# Patient Record
Sex: FEMALE | Race: BLACK OR AFRICAN AMERICAN | Employment: UNEMPLOYED | URBAN - METROPOLITAN AREA
[De-identification: names, ages, dates, MRNs, and addresses within clinical notes are randomized per-mention and may not be internally consistent; named-entity substitution may affect disease eponyms.]

---

## 2020-07-25 PROBLEM — N17.9 AKI (ACUTE KIDNEY INJURY) (HCC): Status: ACTIVE | Noted: 2020-07-25

## 2020-09-01 DIAGNOSIS — N18.4 ANEMIA IN STAGE 4 CHRONIC KIDNEY DISEASE (HCC): ICD-10-CM

## 2020-09-01 DIAGNOSIS — N17.9 AKI (ACUTE KIDNEY INJURY) (HCC): ICD-10-CM

## 2020-09-01 DIAGNOSIS — D63.1 ANEMIA IN STAGE 4 CHRONIC KIDNEY DISEASE (HCC): ICD-10-CM

## 2020-09-01 LAB
ALBUMIN SERPL-MCNC: 3.7 G/DL (ref 3.4–5)
ANION GAP SERPL CALCULATED.3IONS-SCNC: 13 MMOL/L (ref 3–16)
BASOPHILS ABSOLUTE: 0.1 K/UL (ref 0–0.2)
BASOPHILS RELATIVE PERCENT: 0.8 %
BUN BLDV-MCNC: 66 MG/DL (ref 7–20)
CALCIUM SERPL-MCNC: 9 MG/DL (ref 8.3–10.6)
CHLORIDE BLD-SCNC: 100 MMOL/L (ref 99–110)
CO2: 23 MMOL/L (ref 21–32)
CREAT SERPL-MCNC: 5.8 MG/DL (ref 0.6–1.2)
CREATININE URINE: 74.8 MG/DL (ref 28–259)
EOSINOPHILS ABSOLUTE: 0.5 K/UL (ref 0–0.6)
EOSINOPHILS RELATIVE PERCENT: 3.8 %
GFR AFRICAN AMERICAN: 9
GFR NON-AFRICAN AMERICAN: 7
GLUCOSE BLD-MCNC: 119 MG/DL (ref 70–99)
HCT VFR BLD CALC: 24.9 % (ref 36–48)
HEMOGLOBIN: 8.1 G/DL (ref 12–16)
IRON SATURATION: 23 % (ref 15–50)
IRON: 47 UG/DL (ref 37–145)
LYMPHOCYTES ABSOLUTE: 2.5 K/UL (ref 1–5.1)
LYMPHOCYTES RELATIVE PERCENT: 21.1 %
MAGNESIUM: 2.2 MG/DL (ref 1.8–2.4)
MCH RBC QN AUTO: 29.1 PG (ref 26–34)
MCHC RBC AUTO-ENTMCNC: 32.7 G/DL (ref 31–36)
MCV RBC AUTO: 89.1 FL (ref 80–100)
MICROALBUMIN UR-MCNC: 21.3 MG/DL
MICROALBUMIN/CREAT UR-RTO: 284.8 MG/G (ref 0–30)
MONOCYTES ABSOLUTE: 0.7 K/UL (ref 0–1.3)
MONOCYTES RELATIVE PERCENT: 6.2 %
NEUTROPHILS ABSOLUTE: 8.1 K/UL (ref 1.7–7.7)
NEUTROPHILS RELATIVE PERCENT: 68.1 %
PDW BLD-RTO: 16.4 % (ref 12.4–15.4)
PHOSPHORUS: 4.5 MG/DL (ref 2.5–4.9)
PLATELET # BLD: 264 K/UL (ref 135–450)
PMV BLD AUTO: 7.2 FL (ref 5–10.5)
POTASSIUM SERPL-SCNC: 5.2 MMOL/L (ref 3.5–5.1)
RBC # BLD: 2.8 M/UL (ref 4–5.2)
SODIUM BLD-SCNC: 136 MMOL/L (ref 136–145)
TOTAL IRON BINDING CAPACITY: 203 UG/DL (ref 260–445)
WBC # BLD: 11.9 K/UL (ref 4–11)

## 2021-11-15 ENCOUNTER — CLINICAL DOCUMENTATION (OUTPATIENT)
Dept: OTHER | Age: 66
End: 2021-11-15

## 2022-03-14 PROBLEM — N17.9 ACUTE-ON-CHRONIC KIDNEY INJURY (HCC): Status: ACTIVE | Noted: 2018-12-03

## 2022-03-17 PROBLEM — I71.40 AAA (ABDOMINAL AORTIC ANEURYSM) WITHOUT RUPTURE (HCC): Status: ACTIVE | Noted: 2022-03-17

## 2023-09-08 PROBLEM — E78.5 DYSLIPIDEMIA: Status: ACTIVE | Noted: 2023-09-08

## 2023-11-01 ENCOUNTER — TELEPHONE (OUTPATIENT)
Dept: VASCULAR SURGERY | Age: 68
End: 2023-11-01

## 2023-11-01 DIAGNOSIS — I71.40 ABDOMINAL AORTIC ANEURYSM (AAA) WITHOUT RUPTURE, UNSPECIFIED PART (HCC): Primary | ICD-10-CM

## 2023-11-01 NOTE — TELEPHONE ENCOUNTER
Spoke with Phoebe Ya RN at [facility] about follow up appt and CT. I was unable to get Ms. Saleem scheduled for CT prior to appt on 11/7. I was able to schedule CT for 11/14 at 9:30a with arrival time of 9am and then a follow up appt with Dr. Tommy Jack in office at [phone]. This time and dates were repeated and verified with Juana SCHNEIDER and she was informing facility transport of changes.

## 2023-12-09 ENCOUNTER — HOSPITAL ENCOUNTER (EMERGENCY)
Age: 68
Discharge: HOME | End: 2023-12-09
Attending: STUDENT IN AN ORGANIZED HEALTH CARE EDUCATION/TRAINING PROGRAM
Payer: MEDICAID

## 2023-12-09 VITALS
RESPIRATION RATE: 18 BRPM | WEIGHT: 265 LBS | TEMPERATURE: 98.8 F | SYSTOLIC BLOOD PRESSURE: 137 MMHG | BODY MASS INDEX: 41.5 KG/M2 | HEART RATE: 74 BPM | OXYGEN SATURATION: 98 % | DIASTOLIC BLOOD PRESSURE: 71 MMHG

## 2023-12-09 DIAGNOSIS — Z78.9 PROBLEM WITH VASCULAR ACCESS: Primary | ICD-10-CM

## 2023-12-09 PROCEDURE — 99283 EMERGENCY DEPT VISIT LOW MDM: CPT

## 2023-12-09 NOTE — ED NOTES
Report called to Hollie Anderson at [facility].      Orlin Neff RN  12/09/23 8801

## 2023-12-09 NOTE — ED TRIAGE NOTES
Pt to ed from dialysis. Pt needs to have white clip replaced on arterial dialysis replaced. Pt lives [facility].

## 2023-12-09 NOTE — ED NOTES
Patient prepared for and ready to be discharged. Patient discharged at this time in no acute distress after verbalizing understanding of discharge instructions. Patient left after receiving After Visit Summary instructions.        Cathi Mendoza RN  12/09/23 6968

## 2024-02-13 ENCOUNTER — HOSPITAL ENCOUNTER (OUTPATIENT)
Dept: CT IMAGING | Age: 69
Discharge: HOME | End: 2024-02-13
Attending: SURGERY

## 2024-10-12 RX ORDER — TORSEMIDE 20 MG/1
40 TABLET ORAL DAILY
COMMUNITY

## 2024-10-21 ENCOUNTER — OFFICE VISIT (OUTPATIENT)
Dept: ENT CLINIC | Age: 69
End: 2024-10-21
Payer: MEDICAID

## 2024-10-21 VITALS
DIASTOLIC BLOOD PRESSURE: 66 MMHG | SYSTOLIC BLOOD PRESSURE: 134 MMHG | HEIGHT: 67 IN | HEART RATE: 55 BPM | TEMPERATURE: 97.3 F | BODY MASS INDEX: 41.5 KG/M2

## 2024-10-21 DIAGNOSIS — J34.89 NASAL VESTIBULITIS: ICD-10-CM

## 2024-10-21 DIAGNOSIS — H92.01 REFERRED OTALGIA OF RIGHT EAR: Primary | ICD-10-CM

## 2024-10-21 DIAGNOSIS — M26.641 ARTHRITIS OF RIGHT TEMPOROMANDIBULAR JOINT: ICD-10-CM

## 2024-10-21 PROCEDURE — 3075F SYST BP GE 130 - 139MM HG: CPT | Performed by: OTOLARYNGOLOGY

## 2024-10-21 PROCEDURE — 99203 OFFICE O/P NEW LOW 30 MIN: CPT | Performed by: OTOLARYNGOLOGY

## 2024-10-21 PROCEDURE — 3078F DIAST BP <80 MM HG: CPT | Performed by: OTOLARYNGOLOGY

## 2024-10-21 PROCEDURE — 1123F ACP DISCUSS/DSCN MKR DOCD: CPT | Performed by: OTOLARYNGOLOGY

## 2024-10-21 RX ORDER — METHYLPREDNISOLONE 4 MG/1
TABLET ORAL
Qty: 1 KIT | Refills: 0 | Status: SHIPPED | OUTPATIENT
Start: 2024-10-21

## 2024-10-21 RX ORDER — METHOCARBAMOL 500 MG/1
500 TABLET, FILM COATED ORAL 4 TIMES DAILY
COMMUNITY

## 2024-10-21 RX ORDER — CALCITRIOL 0.5 UG/1
0.5 CAPSULE, LIQUID FILLED ORAL DAILY
COMMUNITY

## 2024-10-21 RX ORDER — M-VIT,TX,IRON,MINS/CALC/FOLIC 27MG-0.4MG
1 TABLET ORAL DAILY
COMMUNITY

## 2024-10-21 RX ORDER — MUPIROCIN 20 MG/G
OINTMENT TOPICAL
Qty: 15 G | Refills: 1 | Status: SHIPPED | OUTPATIENT
Start: 2024-10-21

## 2024-10-21 RX ORDER — NORTRIPTYLINE HYDROCHLORIDE 25 MG/1
25 CAPSULE ORAL NIGHTLY
COMMUNITY

## 2024-10-21 NOTE — PROGRESS NOTES
vestibulitis  PLAN: I prescribed a methylprednisolone Dosepak for the patient for her myofascial pain.  I have prescribed mupirocin ointment for her nasal vestibulitis.  In addition she has some pruritus where she had thyroid surgery done at the [facility] about 2 months ago.  I would recommend use of over-the-counter hydrocortisone cream for that.  FOLLOW-UP: As needed.

## 2024-11-03 ENCOUNTER — HOSPITAL ENCOUNTER (INPATIENT)
Age: 69
LOS: 8 days | Discharge: HOME | DRG: 246 | End: 2024-11-11
Admitting: INTERNAL MEDICINE
Payer: MEDICAID

## 2024-11-03 ENCOUNTER — APPOINTMENT (OUTPATIENT)
Dept: CT IMAGING | Age: 69
DRG: 246 | End: 2024-11-03
Payer: MEDICAID

## 2024-11-03 DIAGNOSIS — K62.5 RECTAL BLEEDING: Primary | ICD-10-CM

## 2024-11-03 DIAGNOSIS — I50.32 CHRONIC DIASTOLIC CONGESTIVE HEART FAILURE (HCC): ICD-10-CM

## 2024-11-03 DIAGNOSIS — K55.9 ISCHEMIC COLITIS (HCC): ICD-10-CM

## 2024-11-03 DIAGNOSIS — K62.5 RECTAL BLEED: ICD-10-CM

## 2024-11-03 PROBLEM — K52.9 ACUTE COLITIS: Status: ACTIVE | Noted: 2024-11-03

## 2024-11-03 LAB
ABO + RH BLD: NORMAL
ALBUMIN SERPL-MCNC: 3.8 G/DL (ref 3.4–5)
ALP SERPL-CCNC: 102 U/L (ref 40–129)
ALT SERPL-CCNC: 17 U/L (ref 10–40)
ANION GAP SERPL CALCULATED.3IONS-SCNC: 11 MMOL/L (ref 3–16)
AST SERPL-CCNC: 67 U/L (ref 15–37)
BASOPHILS # BLD: 0 K/UL (ref 0–0.2)
BASOPHILS NFR BLD: 0.3 %
BILIRUB DIRECT SERPL-MCNC: 0.2 MG/DL (ref 0–0.3)
BILIRUB INDIRECT SERPL-MCNC: 0.2 MG/DL (ref 0–1)
BILIRUB SERPL-MCNC: 0.4 MG/DL (ref 0–1)
BLD GP AB SCN SERPL QL: NORMAL
BUN SERPL-MCNC: 24 MG/DL (ref 7–20)
CALCIUM SERPL-MCNC: 8.4 MG/DL (ref 8.3–10.6)
CHLORIDE SERPL-SCNC: 94 MMOL/L (ref 99–110)
CO2 SERPL-SCNC: 23 MMOL/L (ref 21–32)
CREAT SERPL-MCNC: 7.5 MG/DL (ref 0.6–1.2)
DEPRECATED RDW RBC AUTO: 23.6 % (ref 12.4–15.4)
EOSINOPHIL # BLD: 0.9 K/UL (ref 0–0.6)
EOSINOPHIL NFR BLD: 6.3 %
GFR SERPLBLD CREATININE-BSD FMLA CKD-EPI: 5 ML/MIN/{1.73_M2}
GLUCOSE BLD-MCNC: 80 MG/DL (ref 70–99)
GLUCOSE SERPL-MCNC: 95 MG/DL (ref 70–99)
HCT VFR BLD AUTO: 34.6 % (ref 36–48)
HGB BLD-MCNC: 11.4 G/DL (ref 12–16)
LACTATE BLDV-SCNC: 0.6 MMOL/L (ref 0.4–2)
LIPASE SERPL-CCNC: 26 U/L (ref 13–60)
LYMPHOCYTES # BLD: 1.9 K/UL (ref 1–5.1)
LYMPHOCYTES NFR BLD: 12.7 %
MCH RBC QN AUTO: 30.1 PG (ref 26–34)
MCHC RBC AUTO-ENTMCNC: 33 G/DL (ref 31–36)
MCV RBC AUTO: 91.4 FL (ref 80–100)
MONOCYTES # BLD: 1 K/UL (ref 0–1.3)
MONOCYTES NFR BLD: 7.1 %
NEUTROPHILS # BLD: 10.8 K/UL (ref 1.7–7.7)
NEUTROPHILS NFR BLD: 73.6 %
PERFORMED ON: NORMAL
PLATELET # BLD AUTO: 188 K/UL (ref 135–450)
PMV BLD AUTO: 7.2 FL (ref 5–10.5)
POTASSIUM SERPL-SCNC: 4.2 MMOL/L (ref 3.5–5.1)
POTASSIUM SERPL-SCNC: 6 MMOL/L (ref 3.5–5.1)
PROT SERPL-MCNC: 8.1 G/DL (ref 6.4–8.2)
RBC # BLD AUTO: 3.78 M/UL (ref 4–5.2)
SODIUM SERPL-SCNC: 128 MMOL/L (ref 136–145)
WBC # BLD AUTO: 14.7 K/UL (ref 4–11)

## 2024-11-03 PROCEDURE — 99223 1ST HOSP IP/OBS HIGH 75: CPT | Performed by: SURGERY

## 2024-11-03 PROCEDURE — 83605 ASSAY OF LACTIC ACID: CPT

## 2024-11-03 PROCEDURE — 96374 THER/PROPH/DIAG INJ IV PUSH: CPT

## 2024-11-03 PROCEDURE — 6360000004 HC RX CONTRAST MEDICATION

## 2024-11-03 PROCEDURE — 6360000002 HC RX W HCPCS: Performed by: INTERNAL MEDICINE

## 2024-11-03 PROCEDURE — 71275 CT ANGIOGRAPHY CHEST: CPT

## 2024-11-03 PROCEDURE — 6370000000 HC RX 637 (ALT 250 FOR IP): Performed by: INTERNAL MEDICINE

## 2024-11-03 PROCEDURE — 36415 COLL VENOUS BLD VENIPUNCTURE: CPT

## 2024-11-03 PROCEDURE — 83690 ASSAY OF LIPASE: CPT

## 2024-11-03 PROCEDURE — 99254 IP/OBS CNSLTJ NEW/EST MOD 60: CPT | Performed by: SURGERY

## 2024-11-03 PROCEDURE — 86850 RBC ANTIBODY SCREEN: CPT

## 2024-11-03 PROCEDURE — 2060000000 HC ICU INTERMEDIATE R&B

## 2024-11-03 PROCEDURE — 2580000003 HC RX 258: Performed by: PHYSICIAN ASSISTANT

## 2024-11-03 PROCEDURE — 85025 COMPLETE CBC W/AUTO DIFF WBC: CPT

## 2024-11-03 PROCEDURE — 86901 BLOOD TYPING SEROLOGIC RH(D): CPT

## 2024-11-03 PROCEDURE — 86900 BLOOD TYPING SEROLOGIC ABO: CPT

## 2024-11-03 PROCEDURE — 93005 ELECTROCARDIOGRAM TRACING: CPT

## 2024-11-03 PROCEDURE — 80048 BASIC METABOLIC PNL TOTAL CA: CPT

## 2024-11-03 PROCEDURE — 2580000003 HC RX 258: Performed by: INTERNAL MEDICINE

## 2024-11-03 PROCEDURE — 84132 ASSAY OF SERUM POTASSIUM: CPT

## 2024-11-03 PROCEDURE — 80076 HEPATIC FUNCTION PANEL: CPT

## 2024-11-03 PROCEDURE — 6360000002 HC RX W HCPCS: Performed by: PHYSICIAN ASSISTANT

## 2024-11-03 PROCEDURE — 99285 EMERGENCY DEPT VISIT HI MDM: CPT

## 2024-11-03 RX ORDER — MUPIROCIN CALCIUM 20 MG/G
CREAM TOPICAL
COMMUNITY
Start: 2024-10-21

## 2024-11-03 RX ORDER — ACETAMINOPHEN 650 MG/1
650 SUPPOSITORY RECTAL EVERY 6 HOURS PRN
Status: DISCONTINUED | OUTPATIENT
Start: 2024-11-03 | End: 2024-11-11 | Stop reason: HOSPADM

## 2024-11-03 RX ORDER — SODIUM CHLORIDE 9 MG/ML
INJECTION, SOLUTION INTRAVENOUS PRN
Status: DISCONTINUED | OUTPATIENT
Start: 2024-11-03 | End: 2024-11-11 | Stop reason: HOSPADM

## 2024-11-03 RX ORDER — SEVELAMER CARBONATE 800 MG/1
800 TABLET, FILM COATED ORAL
Status: DISCONTINUED | OUTPATIENT
Start: 2024-11-04 | End: 2024-11-11 | Stop reason: HOSPADM

## 2024-11-03 RX ORDER — METRONIDAZOLE 500 MG/100ML
500 INJECTION, SOLUTION INTRAVENOUS EVERY 8 HOURS
Status: DISCONTINUED | OUTPATIENT
Start: 2024-11-04 | End: 2024-11-05

## 2024-11-03 RX ORDER — DIVALPROEX SODIUM 125 MG/1
125 TABLET, DELAYED RELEASE ORAL NIGHTLY
Status: DISCONTINUED | OUTPATIENT
Start: 2024-11-03 | End: 2024-11-11 | Stop reason: HOSPADM

## 2024-11-03 RX ORDER — DIPHENHYDRAMINE HCL 25 MG
25 CAPSULE ORAL EVERY 4 HOURS PRN
COMMUNITY

## 2024-11-03 RX ORDER — SODIUM CHLORIDE 0.9 % (FLUSH) 0.9 %
5-40 SYRINGE (ML) INJECTION PRN
Status: DISCONTINUED | OUTPATIENT
Start: 2024-11-03 | End: 2024-11-11 | Stop reason: HOSPADM

## 2024-11-03 RX ORDER — NORTRIPTYLINE HYDROCHLORIDE 25 MG/1
25 CAPSULE ORAL NIGHTLY
Status: DISCONTINUED | OUTPATIENT
Start: 2024-11-03 | End: 2024-11-11 | Stop reason: HOSPADM

## 2024-11-03 RX ORDER — CALCITRIOL 0.25 UG/1
2 CAPSULE, LIQUID FILLED ORAL 2 TIMES DAILY
Status: DISCONTINUED | OUTPATIENT
Start: 2024-11-03 | End: 2024-11-11 | Stop reason: HOSPADM

## 2024-11-03 RX ORDER — DILTIAZEM HYDROCHLORIDE 300 MG/1
300 CAPSULE, COATED, EXTENDED RELEASE ORAL NIGHTLY
COMMUNITY

## 2024-11-03 RX ORDER — DEXTROSE MONOHYDRATE 100 MG/ML
INJECTION, SOLUTION INTRAVENOUS CONTINUOUS PRN
Status: DISCONTINUED | OUTPATIENT
Start: 2024-11-03 | End: 2024-11-11 | Stop reason: HOSPADM

## 2024-11-03 RX ORDER — INSULIN LISPRO 100 [IU]/ML
0-4 INJECTION, SOLUTION INTRAVENOUS; SUBCUTANEOUS
Status: DISCONTINUED | OUTPATIENT
Start: 2024-11-03 | End: 2024-11-11 | Stop reason: HOSPADM

## 2024-11-03 RX ORDER — ONDANSETRON 4 MG/1
4 TABLET, ORALLY DISINTEGRATING ORAL EVERY 8 HOURS PRN
Status: DISCONTINUED | OUTPATIENT
Start: 2024-11-03 | End: 2024-11-11 | Stop reason: HOSPADM

## 2024-11-03 RX ORDER — METOPROLOL SUCCINATE 50 MG/1
50 TABLET, EXTENDED RELEASE ORAL NIGHTLY
Status: DISCONTINUED | OUTPATIENT
Start: 2024-11-03 | End: 2024-11-11 | Stop reason: HOSPADM

## 2024-11-03 RX ORDER — SEVELAMER CARBONATE 800 MG/1
TABLET, FILM COATED ORAL
COMMUNITY

## 2024-11-03 RX ORDER — SENNOSIDES 8.6 MG
1 CAPSULE ORAL NIGHTLY
COMMUNITY

## 2024-11-03 RX ORDER — AMIODARONE HYDROCHLORIDE 200 MG/1
200 TABLET ORAL 2 TIMES DAILY
Status: DISCONTINUED | OUTPATIENT
Start: 2024-11-03 | End: 2024-11-11 | Stop reason: HOSPADM

## 2024-11-03 RX ORDER — ATORVASTATIN CALCIUM 40 MG/1
40 TABLET, FILM COATED ORAL NIGHTLY
Status: DISCONTINUED | OUTPATIENT
Start: 2024-11-03 | End: 2024-11-11 | Stop reason: HOSPADM

## 2024-11-03 RX ORDER — SODIUM CHLORIDE 0.9 % (FLUSH) 0.9 %
5-40 SYRINGE (ML) INJECTION EVERY 12 HOURS SCHEDULED
Status: DISCONTINUED | OUTPATIENT
Start: 2024-11-03 | End: 2024-11-11 | Stop reason: HOSPADM

## 2024-11-03 RX ORDER — CETIRIZINE HYDROCHLORIDE 10 MG/1
10 TABLET ORAL DAILY
Status: DISCONTINUED | OUTPATIENT
Start: 2024-11-04 | End: 2024-11-11 | Stop reason: HOSPADM

## 2024-11-03 RX ORDER — MORPHINE SULFATE 2 MG/ML
2 INJECTION, SOLUTION INTRAMUSCULAR; INTRAVENOUS EVERY 4 HOURS PRN
Status: DISCONTINUED | OUTPATIENT
Start: 2024-11-03 | End: 2024-11-03

## 2024-11-03 RX ORDER — CALCIUM CARBONATE 500 MG/1
2 TABLET, CHEWABLE ORAL 3 TIMES DAILY
Status: DISCONTINUED | OUTPATIENT
Start: 2024-11-03 | End: 2024-11-11 | Stop reason: HOSPADM

## 2024-11-03 RX ORDER — DIVALPROEX SODIUM 125 MG/1
125 TABLET, DELAYED RELEASE ORAL NIGHTLY
COMMUNITY

## 2024-11-03 RX ORDER — SENNOSIDES A AND B 8.6 MG/1
1 TABLET, FILM COATED ORAL NIGHTLY
Status: DISCONTINUED | OUTPATIENT
Start: 2024-11-03 | End: 2024-11-11 | Stop reason: HOSPADM

## 2024-11-03 RX ORDER — OXYCODONE HYDROCHLORIDE 5 MG/1
5 TABLET ORAL 2 TIMES DAILY
Status: ON HOLD | COMMUNITY
End: 2024-11-11 | Stop reason: HOSPADM

## 2024-11-03 RX ORDER — TORSEMIDE 20 MG/1
40 TABLET ORAL EVERY MORNING
Status: DISCONTINUED | OUTPATIENT
Start: 2024-11-04 | End: 2024-11-11 | Stop reason: HOSPADM

## 2024-11-03 RX ORDER — ACETAMINOPHEN 325 MG/1
650 TABLET ORAL EVERY 6 HOURS PRN
Status: DISCONTINUED | OUTPATIENT
Start: 2024-11-03 | End: 2024-11-11 | Stop reason: HOSPADM

## 2024-11-03 RX ORDER — METRONIDAZOLE 500 MG/100ML
500 INJECTION, SOLUTION INTRAVENOUS EVERY 8 HOURS
Status: DISCONTINUED | OUTPATIENT
Start: 2024-11-03 | End: 2024-11-03 | Stop reason: SDUPTHER

## 2024-11-03 RX ORDER — DIPHENHYDRAMINE HCL 25 MG
25 TABLET ORAL EVERY 4 HOURS PRN
Status: DISCONTINUED | OUTPATIENT
Start: 2024-11-03 | End: 2024-11-11 | Stop reason: HOSPADM

## 2024-11-03 RX ORDER — DILTIAZEM HYDROCHLORIDE 300 MG/1
300 CAPSULE, COATED, EXTENDED RELEASE ORAL NIGHTLY
Status: DISCONTINUED | OUTPATIENT
Start: 2024-11-03 | End: 2024-11-11 | Stop reason: HOSPADM

## 2024-11-03 RX ORDER — POLYETHYLENE GLYCOL 3350 17 G/17G
17 POWDER, FOR SOLUTION ORAL EVERY MORNING
Status: DISCONTINUED | OUTPATIENT
Start: 2024-11-04 | End: 2024-11-11 | Stop reason: HOSPADM

## 2024-11-03 RX ORDER — GLUCAGON 1 MG/ML
1 KIT INJECTION PRN
Status: DISCONTINUED | OUTPATIENT
Start: 2024-11-03 | End: 2024-11-11 | Stop reason: HOSPADM

## 2024-11-03 RX ORDER — INSULIN GLARGINE 100 [IU]/ML
6 INJECTION, SOLUTION SUBCUTANEOUS NIGHTLY
Status: DISCONTINUED | OUTPATIENT
Start: 2024-11-04 | End: 2024-11-11 | Stop reason: HOSPADM

## 2024-11-03 RX ORDER — IOPAMIDOL 755 MG/ML
75 INJECTION, SOLUTION INTRAVASCULAR
Status: COMPLETED | OUTPATIENT
Start: 2024-11-03 | End: 2024-11-03

## 2024-11-03 RX ORDER — SODIUM CHLORIDE 9 MG/ML
INJECTION, SOLUTION INTRAVENOUS CONTINUOUS
Status: DISCONTINUED | OUTPATIENT
Start: 2024-11-03 | End: 2024-11-04

## 2024-11-03 RX ORDER — LANOLIN ALCOHOL/MO/W.PET/CERES
6 CREAM (GRAM) TOPICAL NIGHTLY PRN
Status: DISCONTINUED | OUTPATIENT
Start: 2024-11-03 | End: 2024-11-11 | Stop reason: HOSPADM

## 2024-11-03 RX ORDER — METHOCARBAMOL 500 MG/1
500 TABLET, FILM COATED ORAL EVERY 6 HOURS PRN
Status: DISCONTINUED | OUTPATIENT
Start: 2024-11-03 | End: 2024-11-11 | Stop reason: HOSPADM

## 2024-11-03 RX ORDER — ALBUTEROL SULFATE 0.83 MG/ML
2.5 SOLUTION RESPIRATORY (INHALATION) EVERY 4 HOURS PRN
Status: DISCONTINUED | OUTPATIENT
Start: 2024-11-03 | End: 2024-11-11 | Stop reason: HOSPADM

## 2024-11-03 RX ORDER — ONDANSETRON 2 MG/ML
4 INJECTION INTRAMUSCULAR; INTRAVENOUS EVERY 6 HOURS PRN
Status: DISCONTINUED | OUTPATIENT
Start: 2024-11-03 | End: 2024-11-11 | Stop reason: HOSPADM

## 2024-11-03 RX ORDER — AMIODARONE HYDROCHLORIDE 200 MG/1
200 TABLET ORAL 2 TIMES DAILY
COMMUNITY

## 2024-11-03 RX ADMIN — DILTIAZEM HYDROCHLORIDE 300 MG: 300 CAPSULE, COATED, EXTENDED RELEASE ORAL at 22:09

## 2024-11-03 RX ADMIN — METOPROLOL SUCCINATE 50 MG: 50 TABLET, EXTENDED RELEASE ORAL at 22:09

## 2024-11-03 RX ADMIN — SODIUM CHLORIDE: 9 INJECTION, SOLUTION INTRAVENOUS at 21:58

## 2024-11-03 RX ADMIN — CALCITRIOL CAPSULES 0.25 MCG 2 MCG: 0.25 CAPSULE ORAL at 22:08

## 2024-11-03 RX ADMIN — SENNOSIDES 8.6 MG: 8.6 TABLET, FILM COATED ORAL at 22:09

## 2024-11-03 RX ADMIN — HYDROMORPHONE HYDROCHLORIDE 0.5 MG: 1 INJECTION, SOLUTION INTRAMUSCULAR; INTRAVENOUS; SUBCUTANEOUS at 22:09

## 2024-11-03 RX ADMIN — IOPAMIDOL 75 ML: 755 INJECTION, SOLUTION INTRAVENOUS at 18:00

## 2024-11-03 RX ADMIN — SODIUM CHLORIDE, PRESERVATIVE FREE 10 ML: 5 INJECTION INTRAVENOUS at 22:10

## 2024-11-03 RX ADMIN — AMIODARONE HYDROCHLORIDE 200 MG: 200 TABLET ORAL at 22:09

## 2024-11-03 RX ADMIN — CEFTRIAXONE 1000 MG: 1 INJECTION, POWDER, FOR SOLUTION INTRAMUSCULAR; INTRAVENOUS at 20:52

## 2024-11-03 RX ADMIN — HYDROMORPHONE HYDROCHLORIDE 1 MG: 1 INJECTION, SOLUTION INTRAMUSCULAR; INTRAVENOUS; SUBCUTANEOUS at 17:41

## 2024-11-03 RX ADMIN — NORTRIPTYLINE HYDROCHLORIDE 25 MG: 25 CAPSULE ORAL at 22:09

## 2024-11-03 RX ADMIN — CALCIUM CARBONATE 1000 MG: 500 TABLET, CHEWABLE ORAL at 22:09

## 2024-11-03 RX ADMIN — SODIUM CHLORIDE, PRESERVATIVE FREE 40 MG: 5 INJECTION INTRAVENOUS at 22:10

## 2024-11-03 RX ADMIN — DIVALPROEX SODIUM 125 MG: 125 TABLET, DELAYED RELEASE ORAL at 22:35

## 2024-11-03 RX ADMIN — ATORVASTATIN CALCIUM 40 MG: 40 TABLET, FILM COATED ORAL at 22:09

## 2024-11-03 RX ADMIN — Medication 6 MG: at 22:09

## 2024-11-03 ASSESSMENT — PAIN DESCRIPTION - DESCRIPTORS
DESCRIPTORS: ACHING
DESCRIPTORS: DISCOMFORT

## 2024-11-03 ASSESSMENT — PAIN SCALES - GENERAL
PAINLEVEL_OUTOF10: 0
PAINLEVEL_OUTOF10: 0
PAINLEVEL_OUTOF10: 8
PAINLEVEL_OUTOF10: 6

## 2024-11-03 ASSESSMENT — PAIN DESCRIPTION - PAIN TYPE
TYPE: ACUTE PAIN
TYPE: ACUTE PAIN

## 2024-11-03 ASSESSMENT — PAIN - FUNCTIONAL ASSESSMENT: PAIN_FUNCTIONAL_ASSESSMENT: PREVENTS OR INTERFERES SOME ACTIVE ACTIVITIES AND ADLS

## 2024-11-03 ASSESSMENT — PAIN DESCRIPTION - ORIENTATION: ORIENTATION: MID;LOWER

## 2024-11-03 ASSESSMENT — PAIN DESCRIPTION - LOCATION
LOCATION: RECTUM
LOCATION: ABDOMEN;BACK

## 2024-11-03 ASSESSMENT — PAIN DESCRIPTION - FREQUENCY: FREQUENCY: INTERMITTENT

## 2024-11-03 ASSESSMENT — PAIN DESCRIPTION - ONSET: ONSET: ON-GOING

## 2024-11-03 NOTE — ED TRIAGE NOTES
Patient BIBA from [facility] for rectal bleed.  Patient reports mucous/bleeding started at 10a  Reports two occurrences.

## 2024-11-03 NOTE — ED PROVIDER NOTES
ED Attending Attestation Note     Date of evaluation: 11/3/2024    This patient was seen by the advance practice provider.  I have seen and examined the patient, agree with the workup, evaluation, management and diagnosis. The care plan has been discussed.      EKG by my interpretation shows a sinus rhythm with a first-degree AV block, normal axis, no signs of acute ST elevations or depressions.    69-year-old female with past medical history of end-stage renal disease on dialysis Tuesday Thursday Saturday, AAA repair 2 years ago, presents to the emergency department for acute onset of abdominal pain, and bright red blood per rectum during bowel movements.  Per the patient there is a significant amount of bright red blood in her bowel movements starting today.  She had 2-3 occurrences of this.  She is also complaining of abdominal pain and nausea.  On my exam she does have generalized abdominal tenderness with guarding.  The physician assistant evaluated the rectum and there was bright red blood per rectum as well.  The patient is alert and oriented.  Her breath sounds are normal.  Her heart rate is regular rate and rhythm.  She does appear uncomfortable lying in bed.  Top of my differential is aortoenteric fistula, or other acute intra-abdominal pathology.  As the patient is already on dialysis we will give contrast here with plans of dialysis tomorrow.  CT was concerning for colitis, renal mass, and possible endograft leak.  General surgery and vascular surgery were consulted.  Vascular surgery had no acute recommendations for the graft leak and they will follow.  They did recommend performing EKG and lactic acid.  Lactic acid not elevated.  EKG shows no signs of atrial fibrillation.  Lower concern for ischemic colitis at this point in time.  They will follow with serial abdominal exams with the patient is hospitalized.  They did recommend a GI consult hospitalization.  The patient will be admitted to the 
      Disposition     PATIENT REFERRED TO:  No follow-up provider specified.    DISCHARGE MEDICATIONS:  New Prescriptions    No medications on file       DISPOSITION               Boni Barbour PA-C  11/03/24 2008       Boni Barbour PA-C  11/03/24 2030       Boni Barbour PA-C  11/03/24 2032

## 2024-11-04 ENCOUNTER — ANESTHESIA EVENT (OUTPATIENT)
Dept: ENDOSCOPY | Age: 69
End: 2024-11-04
Payer: MEDICAID

## 2024-11-04 ENCOUNTER — APPOINTMENT (OUTPATIENT)
Dept: GENERAL RADIOLOGY | Age: 69
DRG: 246 | End: 2024-11-04
Payer: MEDICAID

## 2024-11-04 ENCOUNTER — APPOINTMENT (OUTPATIENT)
Dept: CT IMAGING | Age: 69
DRG: 246 | End: 2024-11-04
Payer: MEDICAID

## 2024-11-04 PROBLEM — M89.9 CHRONIC KIDNEY DISEASE-MINERAL AND BONE DISORDER (CKD-MBD): Status: ACTIVE | Noted: 2024-11-04

## 2024-11-04 PROBLEM — N18.9 CHRONIC KIDNEY DISEASE-MINERAL AND BONE DISORDER (CKD-MBD): Status: ACTIVE | Noted: 2024-11-04

## 2024-11-04 PROBLEM — E83.9 CHRONIC KIDNEY DISEASE-MINERAL AND BONE DISORDER (CKD-MBD): Status: ACTIVE | Noted: 2024-11-04

## 2024-11-04 PROBLEM — K62.5 RECTAL BLEEDING: Status: ACTIVE | Noted: 2024-11-04

## 2024-11-04 PROBLEM — D63.1 ANEMIA OF CHRONIC RENAL FAILURE: Status: ACTIVE | Noted: 2024-11-04

## 2024-11-04 PROBLEM — N18.9 ANEMIA OF CHRONIC RENAL FAILURE: Status: ACTIVE | Noted: 2024-11-04

## 2024-11-04 LAB
ANION GAP SERPL CALCULATED.3IONS-SCNC: 13 MMOL/L (ref 3–16)
APTT BLD: 32.1 SEC (ref 22.1–36.4)
BACTERIA URNS QL MICRO: ABNORMAL /HPF
BILIRUB UR QL STRIP.AUTO: NEGATIVE
BUN SERPL-MCNC: 27 MG/DL (ref 7–20)
C DIFF TOX A+B STL QL IA: NORMAL
CALCIUM SERPL-MCNC: 8 MG/DL (ref 8.3–10.6)
CHLORIDE SERPL-SCNC: 98 MMOL/L (ref 99–110)
CLARITY UR: CLEAR
CO2 SERPL-SCNC: 21 MMOL/L (ref 21–32)
COLOR UR: YELLOW
CREAT SERPL-MCNC: 8.1 MG/DL (ref 0.6–1.2)
EKG ATRIAL RATE: 70 BPM
EKG DIAGNOSIS: NORMAL
EKG P AXIS: 29 DEGREES
EKG P-R INTERVAL: 224 MS
EKG Q-T INTERVAL: 474 MS
EKG QRS DURATION: 100 MS
EKG QTC CALCULATION (BAZETT): 511 MS
EKG R AXIS: 25 DEGREES
EKG T AXIS: 70 DEGREES
EKG VENTRICULAR RATE: 70 BPM
EPI CELLS #/AREA URNS HPF: ABNORMAL /HPF (ref 0–5)
EST. AVERAGE GLUCOSE BLD GHB EST-MCNC: 91.1 MG/DL
GFR SERPLBLD CREATININE-BSD FMLA CKD-EPI: 5 ML/MIN/{1.73_M2}
GLUCOSE BLD-MCNC: 132 MG/DL (ref 70–99)
GLUCOSE BLD-MCNC: 60 MG/DL (ref 70–99)
GLUCOSE BLD-MCNC: 68 MG/DL (ref 70–99)
GLUCOSE BLD-MCNC: 78 MG/DL (ref 70–99)
GLUCOSE BLD-MCNC: 87 MG/DL (ref 70–99)
GLUCOSE BLD-MCNC: 91 MG/DL (ref 70–99)
GLUCOSE SERPL-MCNC: 92 MG/DL (ref 70–99)
GLUCOSE UR STRIP.AUTO-MCNC: NEGATIVE MG/DL
HBA1C MFR BLD: 4.8 %
HCT VFR BLD AUTO: 32.9 % (ref 36–48)
HGB BLD-MCNC: 10.7 G/DL (ref 12–16)
HGB UR QL STRIP.AUTO: ABNORMAL
INR PPP: 1.08 (ref 0.85–1.15)
IRON SATN MFR SERPL: 20 % (ref 15–50)
IRON SERPL-MCNC: 27 UG/DL (ref 37–145)
KETONES UR STRIP.AUTO-MCNC: NEGATIVE MG/DL
LEUKOCYTE ESTERASE UR QL STRIP.AUTO: ABNORMAL
NITRITE UR QL STRIP.AUTO: NEGATIVE
PERFORMED ON: ABNORMAL
PERFORMED ON: NORMAL
PH UR STRIP.AUTO: 7 [PH] (ref 5–8)
POTASSIUM SERPL-SCNC: 4.5 MMOL/L (ref 3.5–5.1)
PROT UR STRIP.AUTO-MCNC: 100 MG/DL
PROTHROMBIN TIME: 14.2 SEC (ref 11.9–14.9)
RBC #/AREA URNS HPF: ABNORMAL /HPF (ref 0–4)
SODIUM SERPL-SCNC: 132 MMOL/L (ref 136–145)
SP GR UR STRIP.AUTO: 1.02 (ref 1–1.03)
TIBC SERPL-MCNC: 137 UG/DL (ref 260–445)
UA COMPLETE W REFLEX CULTURE PNL UR: YES
UA DIPSTICK W REFLEX MICRO PNL UR: YES
URN SPEC COLLECT METH UR: ABNORMAL
UROBILINOGEN UR STRIP-ACNC: 0.2 E.U./DL
WBC #/AREA URNS HPF: ABNORMAL /HPF (ref 0–5)

## 2024-11-04 PROCEDURE — 6360000002 HC RX W HCPCS: Performed by: INTERNAL MEDICINE

## 2024-11-04 PROCEDURE — 83550 IRON BINDING TEST: CPT

## 2024-11-04 PROCEDURE — 2060000000 HC ICU INTERMEDIATE R&B

## 2024-11-04 PROCEDURE — 81001 URINALYSIS AUTO W/SCOPE: CPT

## 2024-11-04 PROCEDURE — 6360000004 HC RX CONTRAST MEDICATION

## 2024-11-04 PROCEDURE — 85014 HEMATOCRIT: CPT

## 2024-11-04 PROCEDURE — 87324 CLOSTRIDIUM AG IA: CPT

## 2024-11-04 PROCEDURE — 99233 SBSQ HOSP IP/OBS HIGH 50: CPT | Performed by: SURGERY

## 2024-11-04 PROCEDURE — 87077 CULTURE AEROBIC IDENTIFY: CPT

## 2024-11-04 PROCEDURE — 80048 BASIC METABOLIC PNL TOTAL CA: CPT

## 2024-11-04 PROCEDURE — 83540 ASSAY OF IRON: CPT

## 2024-11-04 PROCEDURE — 99223 1ST HOSP IP/OBS HIGH 75: CPT | Performed by: HOSPITALIST

## 2024-11-04 PROCEDURE — 87186 SC STD MICRODIL/AGAR DIL: CPT

## 2024-11-04 PROCEDURE — 99232 SBSQ HOSP IP/OBS MODERATE 35: CPT | Performed by: SURGERY

## 2024-11-04 PROCEDURE — 85018 HEMOGLOBIN: CPT

## 2024-11-04 PROCEDURE — 74018 RADEX ABDOMEN 1 VIEW: CPT

## 2024-11-04 PROCEDURE — 85610 PROTHROMBIN TIME: CPT

## 2024-11-04 PROCEDURE — 2580000003 HC RX 258: Performed by: INTERNAL MEDICINE

## 2024-11-04 PROCEDURE — 36415 COLL VENOUS BLD VENIPUNCTURE: CPT

## 2024-11-04 PROCEDURE — 74174 CTA ABD&PLVS W/CONTRAST: CPT

## 2024-11-04 PROCEDURE — 87086 URINE CULTURE/COLONY COUNT: CPT

## 2024-11-04 PROCEDURE — 6370000000 HC RX 637 (ALT 250 FOR IP): Performed by: INTERNAL MEDICINE

## 2024-11-04 PROCEDURE — 85730 THROMBOPLASTIN TIME PARTIAL: CPT

## 2024-11-04 PROCEDURE — 87449 NOS EACH ORGANISM AG IA: CPT

## 2024-11-04 PROCEDURE — 83036 HEMOGLOBIN GLYCOSYLATED A1C: CPT

## 2024-11-04 RX ORDER — LIDOCAINE HYDROCHLORIDE 10 MG/ML
1 INJECTION, SOLUTION EPIDURAL; INFILTRATION; INTRACAUDAL; PERINEURAL
Status: CANCELLED | OUTPATIENT
Start: 2024-11-04 | End: 2024-11-05

## 2024-11-04 RX ORDER — SODIUM CHLORIDE 0.9 % (FLUSH) 0.9 %
5-40 SYRINGE (ML) INJECTION EVERY 12 HOURS SCHEDULED
Status: CANCELLED | OUTPATIENT
Start: 2024-11-04

## 2024-11-04 RX ORDER — IOPAMIDOL 755 MG/ML
75 INJECTION, SOLUTION INTRAVASCULAR
Status: COMPLETED | OUTPATIENT
Start: 2024-11-04 | End: 2024-11-04

## 2024-11-04 RX ORDER — SODIUM CHLORIDE 0.9 % (FLUSH) 0.9 %
5-40 SYRINGE (ML) INJECTION PRN
Status: CANCELLED | OUTPATIENT
Start: 2024-11-04

## 2024-11-04 RX ORDER — SODIUM CHLORIDE, SODIUM LACTATE, POTASSIUM CHLORIDE, CALCIUM CHLORIDE 600; 310; 30; 20 MG/100ML; MG/100ML; MG/100ML; MG/100ML
INJECTION, SOLUTION INTRAVENOUS CONTINUOUS
Status: CANCELLED | OUTPATIENT
Start: 2024-11-04

## 2024-11-04 RX ADMIN — IOPAMIDOL 75 ML: 755 INJECTION, SOLUTION INTRAVENOUS at 18:40

## 2024-11-04 RX ADMIN — DILTIAZEM HYDROCHLORIDE 300 MG: 300 CAPSULE, COATED, EXTENDED RELEASE ORAL at 21:30

## 2024-11-04 RX ADMIN — METHOCARBAMOL TABLETS 500 MG: 500 TABLET, COATED ORAL at 08:50

## 2024-11-04 RX ADMIN — HYDROMORPHONE HYDROCHLORIDE 0.5 MG: 1 INJECTION, SOLUTION INTRAMUSCULAR; INTRAVENOUS; SUBCUTANEOUS at 15:46

## 2024-11-04 RX ADMIN — HYDROMORPHONE HYDROCHLORIDE 0.5 MG: 1 INJECTION, SOLUTION INTRAMUSCULAR; INTRAVENOUS; SUBCUTANEOUS at 02:35

## 2024-11-04 RX ADMIN — HYDROMORPHONE HYDROCHLORIDE 0.5 MG: 1 INJECTION, SOLUTION INTRAMUSCULAR; INTRAVENOUS; SUBCUTANEOUS at 21:13

## 2024-11-04 RX ADMIN — DIVALPROEX SODIUM 125 MG: 125 TABLET, DELAYED RELEASE ORAL at 23:04

## 2024-11-04 RX ADMIN — AMIODARONE HYDROCHLORIDE 200 MG: 200 TABLET ORAL at 21:28

## 2024-11-04 RX ADMIN — Medication 6 MG: at 23:05

## 2024-11-04 RX ADMIN — SODIUM CHLORIDE, PRESERVATIVE FREE 40 MG: 5 INJECTION INTRAVENOUS at 21:19

## 2024-11-04 RX ADMIN — CALCIUM CARBONATE 1000 MG: 500 TABLET, CHEWABLE ORAL at 21:32

## 2024-11-04 RX ADMIN — CALCIUM CARBONATE 1000 MG: 500 TABLET, CHEWABLE ORAL at 08:50

## 2024-11-04 RX ADMIN — SODIUM CHLORIDE, PRESERVATIVE FREE 10 ML: 5 INJECTION INTRAVENOUS at 21:19

## 2024-11-04 RX ADMIN — HYDROMORPHONE HYDROCHLORIDE 0.5 MG: 1 INJECTION, SOLUTION INTRAMUSCULAR; INTRAVENOUS; SUBCUTANEOUS at 07:13

## 2024-11-04 RX ADMIN — SODIUM CHLORIDE, PRESERVATIVE FREE 10 ML: 5 INJECTION INTRAVENOUS at 21:14

## 2024-11-04 RX ADMIN — SODIUM CHLORIDE: 9 INJECTION, SOLUTION INTRAVENOUS at 11:46

## 2024-11-04 RX ADMIN — POLYETHYLENE GLYCOL 3350 17 G: 17 POWDER, FOR SOLUTION ORAL at 08:54

## 2024-11-04 RX ADMIN — METHOCARBAMOL TABLETS 500 MG: 500 TABLET, COATED ORAL at 05:11

## 2024-11-04 RX ADMIN — WATER 2000 MG: 1 INJECTION INTRAMUSCULAR; INTRAVENOUS; SUBCUTANEOUS at 18:55

## 2024-11-04 RX ADMIN — CALCITRIOL CAPSULES 0.25 MCG 2 MCG: 0.25 CAPSULE ORAL at 08:50

## 2024-11-04 RX ADMIN — SODIUM CHLORIDE, PRESERVATIVE FREE 40 MG: 5 INJECTION INTRAVENOUS at 08:51

## 2024-11-04 RX ADMIN — HYDROMORPHONE HYDROCHLORIDE 0.5 MG: 1 INJECTION, SOLUTION INTRAMUSCULAR; INTRAVENOUS; SUBCUTANEOUS at 11:44

## 2024-11-04 RX ADMIN — METRONIDAZOLE 500 MG: 500 INJECTION, SOLUTION INTRAVENOUS at 00:13

## 2024-11-04 RX ADMIN — CETIRIZINE HYDROCHLORIDE 10 MG: 10 TABLET, FILM COATED ORAL at 08:50

## 2024-11-04 RX ADMIN — CALCITRIOL CAPSULES 0.25 MCG 2 MCG: 0.25 CAPSULE ORAL at 21:35

## 2024-11-04 RX ADMIN — SODIUM CHLORIDE, PRESERVATIVE FREE 10 ML: 5 INJECTION INTRAVENOUS at 08:49

## 2024-11-04 RX ADMIN — SENNOSIDES 8.6 MG: 8.6 TABLET, FILM COATED ORAL at 21:30

## 2024-11-04 RX ADMIN — METHOCARBAMOL TABLETS 500 MG: 500 TABLET, COATED ORAL at 18:54

## 2024-11-04 RX ADMIN — DEXTROSE MONOHYDRATE 125 ML: 100 INJECTION, SOLUTION INTRAVENOUS at 21:51

## 2024-11-04 RX ADMIN — METOPROLOL SUCCINATE 50 MG: 50 TABLET, EXTENDED RELEASE ORAL at 23:04

## 2024-11-04 RX ADMIN — METRONIDAZOLE 500 MG: 500 INJECTION, SOLUTION INTRAVENOUS at 08:53

## 2024-11-04 RX ADMIN — ATORVASTATIN CALCIUM 40 MG: 40 TABLET, FILM COATED ORAL at 21:29

## 2024-11-04 RX ADMIN — METRONIDAZOLE 500 MG: 500 INJECTION, SOLUTION INTRAVENOUS at 15:47

## 2024-11-04 RX ADMIN — AMIODARONE HYDROCHLORIDE 200 MG: 200 TABLET ORAL at 08:50

## 2024-11-04 RX ADMIN — TORSEMIDE 40 MG: 20 TABLET ORAL at 08:50

## 2024-11-04 RX ADMIN — NORTRIPTYLINE HYDROCHLORIDE 25 MG: 25 CAPSULE ORAL at 21:33

## 2024-11-04 ASSESSMENT — PAIN DESCRIPTION - FREQUENCY
FREQUENCY: CONTINUOUS

## 2024-11-04 ASSESSMENT — PAIN DESCRIPTION - DESCRIPTORS
DESCRIPTORS: ACHING

## 2024-11-04 ASSESSMENT — PAIN DESCRIPTION - ONSET
ONSET: ON-GOING

## 2024-11-04 ASSESSMENT — PAIN DESCRIPTION - LOCATION
LOCATION: ABDOMEN;BACK
LOCATION: ABDOMEN
LOCATION: ABDOMEN;BACK
LOCATION: ABDOMEN;BACK
LOCATION: ABDOMEN
LOCATION: ABDOMEN
LOCATION: ABDOMEN;BACK

## 2024-11-04 ASSESSMENT — PAIN SCALES - GENERAL
PAINLEVEL_OUTOF10: 10
PAINLEVEL_OUTOF10: 9
PAINLEVEL_OUTOF10: 0
PAINLEVEL_OUTOF10: 6
PAINLEVEL_OUTOF10: 0
PAINLEVEL_OUTOF10: 8
PAINLEVEL_OUTOF10: 7
PAINLEVEL_OUTOF10: 9
PAINLEVEL_OUTOF10: 7
PAINLEVEL_OUTOF10: 10
PAINLEVEL_OUTOF10: 7
PAINLEVEL_OUTOF10: 10
PAINLEVEL_OUTOF10: 9
PAINLEVEL_OUTOF10: 7
PAINLEVEL_OUTOF10: 7
PAINLEVEL_OUTOF10: 9
PAINLEVEL_OUTOF10: 9

## 2024-11-04 ASSESSMENT — PAIN - FUNCTIONAL ASSESSMENT

## 2024-11-04 ASSESSMENT — PAIN DESCRIPTION - ORIENTATION
ORIENTATION: LOWER;MID
ORIENTATION: MID;LOWER
ORIENTATION: MID;LOWER;RIGHT;LEFT;UPPER
ORIENTATION: LOWER;MID

## 2024-11-04 ASSESSMENT — PAIN DESCRIPTION - PAIN TYPE
TYPE: ACUTE PAIN

## 2024-11-04 NOTE — CARE COORDINATION
Case Management Assessment  Initial Evaluation    Date/Time of Evaluation: 11/4/2024 2:45 PM  Assessment Completed by: Dmitriy Moreno RN    If patient is discharged prior to next notation, then this note serves as note for discharge by case management.    Patient Name: [place] Saleem                   YOB: 1955  Diagnosis: Rectal bleeding [K62.5]  Acute colitis [K52.9]                   Date / Time: 11/3/2024  4:25 PM    Patient Admission Status: Inpatient   Readmission Risk (Low < 19, Mod (19-27), High > 27): Readmission Risk Score: 19    Current PCP: Kim Liu MD  PCP verified by CM? Yes    Chart Reviewed: Yes      History Provided by: Patient, Medical Record  Patient Orientation: Alert and Oriented, Person, Place, Situation, Self    Patient Cognition: Alert    Hospitalization in the last 30 days (Readmission):  No    If yes, Readmission Assessment in  Navigator will be completed.    Advance Directives:      Code Status: Full Code   Patient's Primary Decision Maker is:      Primary Decision Maker: JosseDedra - Child - [phone]    Discharge Planning:    Patient lives with: Alone Type of Home: Assisted living  Primary Care Giver: Self  Patient Support Systems include: Other (Comment) (Staff)   Current Financial resources:    Current community resources:    Current services prior to admission: Skilled Nursing Facility            Current DME:              Type of Home Care services:  None    ADLS  Prior functional level: Assistance with the following:, Bathing, Dressing, Toileting, Feeding, Cooking, Housework, Shopping, Mobility  Current functional level: Assistance with the following:, Bathing, Dressing, Toileting, Feeding, Cooking, Housework, Shopping, Mobility    PT AM-PAC:   /24  OT AM-PAC:   /24    Family can provide assistance at DC: Yes  Would you like Case Management to discuss the discharge plan with any other family members/significant others, and if so, who? No  Plans to Return

## 2024-11-04 NOTE — ANESTHESIA PRE PROCEDURE
11/04/2024 02:36 AM    CO2 21 11/04/2024 02:36 AM    BUN 27 11/04/2024 02:36 AM    CREATININE 8.1 11/04/2024 02:36 AM    GFRAA 8 03/11/2022 02:57 PM    AGRATIO 1.0 09/10/2023 08:33 AM    LABGLOM 5 11/04/2024 02:36 AM    LABGLOM 6 09/19/2023 11:26 AM    GLUCOSE 92 11/04/2024 02:36 AM    CALCIUM 8.0 11/04/2024 02:36 AM    BILITOT 0.4 11/03/2024 05:35 PM    ALKPHOS 102 11/03/2024 05:35 PM    AST 67 11/03/2024 05:35 PM    ALT 17 11/03/2024 05:35 PM       POC Tests:   Recent Labs     11/04/24  1114   POCGLU 87       Coags:   Lab Results   Component Value Date/Time    PROTIME 14.2 11/04/2024 02:36 AM    INR 1.08 11/04/2024 02:36 AM    APTT 32.1 11/04/2024 02:36 AM       HCG (If Applicable): No results found for: \"PREGTESTUR\", \"PREGSERUM\", \"HCG\", \"HCGQUANT\"     ABGs: No results found for: \"PHART\", \"PO2ART\", \"NTE7QVT\", \"DVG8UQD\", \"BEART\", \"Z8KNTKQL\"     Type & Screen (If Applicable):  Lab Results   Component Value Date    ABORH A POS 11/03/2024    LABANTI NEG 11/03/2024       Drug/Infectious Status (If Applicable):  No results found for: \"HIV\", \"HEPCAB\"    COVID-19 Screening (If Applicable):   Lab Results   Component Value Date/Time    COVID19 Not Detected 09/07/2023 09:38 PM           Anesthesia Evaluation  Patient summary reviewed and Nursing notes reviewed   no history of anesthetic complications:   Airway: Mallampati: III  TM distance: >3 FB   Neck ROM: full  Comment: Prior intubation Leora 3 grade 1 view, large tongue   Mouth opening: > = 3 FB   Dental:    (+) edentulous      Pulmonary: breath sounds clear to auscultation  (+)  COPD:          asthma:                            Cardiovascular:  Exercise tolerance: poor (<4 METS)  (+) hypertension:      ECG reviewed  Rhythm: regular  Rate: normal  Echocardiogram reviewed               ROS comment: AAA repaired endovascular in 2023    ROSELYN scheduled for 11/4/24    Echo 9/8/23  EF 60-65%  Mild tricuspid and mitral regurgitation      Neuro/Psych:   (+) psychiatric history:

## 2024-11-04 NOTE — H&P
extended release tablet Take 1 tablet by mouth at bedtime 1/29/22  Yes Provider, MD Lis   polyethylene glycol (GLYCOLAX) 17 g packet Take 1 packet by mouth every morning   Yes Provider, MD Lis       Labs: Personally reviewed and interpreted for clinical significance.   Recent Labs     11/03/24  1735   WBC 14.7*   HGB 11.4*   HCT 34.6*        Recent Labs     11/03/24  1735 11/03/24  1900   *  --    K 6.0* 4.2   CL 94*  --    CO2 23  --    BUN 24*  --    CREATININE 7.5*  --    CALCIUM 8.4  --      No results for input(s): \"PROBNP\", \"TROPHS\" in the last 72 hours.  No results for input(s): \"LABA1C\" in the last 72 hours.  Recent Labs     11/03/24  1735   AST 67*   ALT 17   BILIDIR 0.2   BILITOT 0.4   ALKPHOS 102     Recent Labs     11/03/24  1932   LACTA 0.6        Andrade Cr MD

## 2024-11-04 NOTE — ED NOTES
How does patient ambulate?   []Low Fall Risk (ambulates by themselves without support)  []Stand by assist   []Contact Guard   []Front wheel walker  []Wheelchair   []Steady  [x]Bed bound  []History of Lower Extremity Amputation  []Unknown, did not assess in the emergency department   How does patient take pills?  []Whole with Water  []Crushed in applesauce  []Crushed in pudding  []Other  [x]Unknown no oral medications were given in the ED  Is patient alert?   [x]Alert  []Drowsy but responds to voice  []Doesn't respond to voice but responds to painful stimuli  []Unresponsive  Is patient oriented?   [x]To person  [x]To place  [x]To time  [x]To situation  []Confused  []Agitated  [x]Follows commands  If patient is disoriented or from a Skill Nursing Facility has family been notified of admission?   []Yes   [x]No  Patient belongings?   []Cell phone  []Wallet   []Dentures  [x]Clothing  Any specific patient or family belongings/needs/dynamics?     Miscellaneous comments/pending orders?       If there are any additional questions please reach out to the Emergency Department.            Antonieta Winston RN  11/03/24 2050

## 2024-11-05 ENCOUNTER — APPOINTMENT (OUTPATIENT)
Age: 69
DRG: 246 | End: 2024-11-05
Payer: MEDICAID

## 2024-11-05 ENCOUNTER — APPOINTMENT (OUTPATIENT)
Dept: ENDOSCOPY | Age: 69
DRG: 246 | End: 2024-11-05
Attending: INTERNAL MEDICINE
Payer: MEDICAID

## 2024-11-05 ENCOUNTER — ANESTHESIA (OUTPATIENT)
Dept: ENDOSCOPY | Age: 69
End: 2024-11-05
Payer: MEDICAID

## 2024-11-05 LAB
ALBUMIN SERPL-MCNC: 3.2 G/DL (ref 3.4–5)
ALBUMIN SERPL-MCNC: 3.6 G/DL (ref 3.4–5)
ALP SERPL-CCNC: 117 U/L (ref 40–129)
ALT SERPL-CCNC: 8 U/L (ref 10–40)
ANION GAP SERPL CALCULATED.3IONS-SCNC: 13 MMOL/L (ref 3–16)
AST SERPL-CCNC: 22 U/L (ref 15–37)
BASOPHILS # BLD: 0.1 K/UL (ref 0–0.2)
BASOPHILS NFR BLD: 0.4 %
BILIRUB DIRECT SERPL-MCNC: <0.1 MG/DL (ref 0–0.3)
BILIRUB INDIRECT SERPL-MCNC: 0.2 MG/DL (ref 0–1)
BILIRUB SERPL-MCNC: 0.3 MG/DL (ref 0–1)
BUN SERPL-MCNC: 35 MG/DL (ref 7–20)
C DIFF TOX A+B STL QL IA: NORMAL
CALCIUM SERPL-MCNC: 7.9 MG/DL (ref 8.3–10.6)
CHLORIDE SERPL-SCNC: 99 MMOL/L (ref 99–110)
CO2 SERPL-SCNC: 21 MMOL/L (ref 21–32)
CREAT SERPL-MCNC: 9.6 MG/DL (ref 0.6–1.2)
DEPRECATED RDW RBC AUTO: 22.7 % (ref 12.4–15.4)
ECHO AV MEAN GRADIENT: 7 MMHG
ECHO AV MEAN VELOCITY: 1.3 M/S
ECHO AV PEAK GRADIENT: 13 MMHG
ECHO AV PEAK VELOCITY: 1.8 M/S
ECHO AV VELOCITY RATIO: 0.78
ECHO AV VTI: 38.1 CM
ECHO BSA: 2.33 M2
ECHO LA AREA 2C: 21.6 CM2
ECHO LA AREA 4C: 22.2 CM2
ECHO LA MAJOR AXIS: 6.3 CM
ECHO LA MINOR AXIS: 6.5 CM
ECHO LA VOL BP: 61 ML (ref 22–52)
ECHO LA VOL MOD A2C: 59 ML (ref 22–52)
ECHO LA VOL MOD A4C: 62 ML (ref 22–52)
ECHO LA VOL/BSA BIPLANE: 27 ML/M2 (ref 16–34)
ECHO LA VOLUME INDEX MOD A2C: 26 ML/M2 (ref 16–34)
ECHO LA VOLUME INDEX MOD A4C: 28 ML/M2 (ref 16–34)
ECHO LV EDV A2C: 84 ML
ECHO LV EDV A4C: 127 ML
ECHO LV EDV INDEX A4C: 57 ML/M2
ECHO LV EDV NDEX A2C: 38 ML/M2
ECHO LV EJECTION FRACTION A2C: 54 %
ECHO LV EJECTION FRACTION A4C: 76 %
ECHO LV EJECTION FRACTION BIPLANE: 64 % (ref 55–100)
ECHO LV ESV A2C: 38 ML
ECHO LV ESV A4C: 31 ML
ECHO LV ESV INDEX A2C: 17 ML/M2
ECHO LV ESV INDEX A4C: 14 ML/M2
ECHO LVOT AV VTI INDEX: 0.81
ECHO LVOT MEAN GRADIENT: 4 MMHG
ECHO LVOT PEAK GRADIENT: 8 MMHG
ECHO LVOT PEAK VELOCITY: 1.4 M/S
ECHO LVOT VTI: 31 CM
ECHO MV A VELOCITY: 1.08 M/S
ECHO MV E DECELERATION TIME (DT): 235 MS
ECHO MV E VELOCITY: 0.91 M/S
ECHO MV E/A RATIO: 0.84
ECHO MV LVOT VTI INDEX: 1.09
ECHO MV MAX VELOCITY: 1.2 M/S
ECHO MV MEAN GRADIENT: 2 MMHG
ECHO MV MEAN VELOCITY: 0.7 M/S
ECHO MV PEAK GRADIENT: 6 MMHG
ECHO MV REGURGITANT PEAK GRADIENT: 9 MMHG
ECHO MV REGURGITANT PEAK VELOCITY: 1.5 M/S
ECHO MV VTI: 33.8 CM
ECHO RV FREE WALL PEAK S': 16.3 CM/S
ECHO RV TAPSE: 2.2 CM (ref 1.7–?)
ECHO TV REGURGITANT MAX VELOCITY: 1.46 M/S
ECHO TV REGURGITANT PEAK GRADIENT: 9 MMHG
EOSINOPHIL # BLD: 0.8 K/UL (ref 0–0.6)
EOSINOPHIL NFR BLD: 5.2 %
GFR SERPLBLD CREATININE-BSD FMLA CKD-EPI: 4 ML/MIN/{1.73_M2}
GLUCOSE BLD-MCNC: 104 MG/DL (ref 70–99)
GLUCOSE BLD-MCNC: 63 MG/DL (ref 70–99)
GLUCOSE BLD-MCNC: 67 MG/DL (ref 70–99)
GLUCOSE BLD-MCNC: 81 MG/DL (ref 70–99)
GLUCOSE BLD-MCNC: 82 MG/DL (ref 70–99)
GLUCOSE BLD-MCNC: 94 MG/DL (ref 70–99)
GLUCOSE SERPL-MCNC: 75 MG/DL (ref 70–99)
HCT VFR BLD AUTO: 32.3 % (ref 36–48)
HCT VFR BLD AUTO: 36.7 % (ref 36–48)
HGB BLD-MCNC: 10.3 G/DL (ref 12–16)
HGB BLD-MCNC: 11.7 G/DL (ref 12–16)
LACTATE BLDV-SCNC: 0.9 MMOL/L (ref 0.4–2)
LYMPHOCYTES # BLD: 1.4 K/UL (ref 1–5.1)
LYMPHOCYTES NFR BLD: 8.8 %
MCH RBC QN AUTO: 29.3 PG (ref 26–34)
MCHC RBC AUTO-ENTMCNC: 31.8 G/DL (ref 31–36)
MCV RBC AUTO: 92 FL (ref 80–100)
MONOCYTES # BLD: 1.2 K/UL (ref 0–1.3)
MONOCYTES NFR BLD: 7.8 %
NEUTROPHILS # BLD: 12.4 K/UL (ref 1.7–7.7)
NEUTROPHILS NFR BLD: 77.8 %
PERFORMED ON: ABNORMAL
PERFORMED ON: NORMAL
PHOSPHATE SERPL-MCNC: 5.9 MG/DL (ref 2.5–4.9)
PLATELET # BLD AUTO: 139 K/UL (ref 135–450)
PMV BLD AUTO: 6.7 FL (ref 5–10.5)
POTASSIUM SERPL-SCNC: 4.9 MMOL/L (ref 3.5–5.1)
POTASSIUM SERPL-SCNC: 4.9 MMOL/L (ref 3.5–5.1)
PROT SERPL-MCNC: 7.3 G/DL (ref 6.4–8.2)
RBC # BLD AUTO: 3.51 M/UL (ref 4–5.2)
SODIUM SERPL-SCNC: 133 MMOL/L (ref 136–145)
WBC # BLD AUTO: 15.9 K/UL (ref 4–11)

## 2024-11-05 PROCEDURE — 6360000002 HC RX W HCPCS: Performed by: NURSE ANESTHETIST, CERTIFIED REGISTERED

## 2024-11-05 PROCEDURE — 90935 HEMODIALYSIS ONE EVALUATION: CPT

## 2024-11-05 PROCEDURE — 2580000003 HC RX 258: Performed by: INTERNAL MEDICINE

## 2024-11-05 PROCEDURE — 2060000000 HC ICU INTERMEDIATE R&B

## 2024-11-05 PROCEDURE — 3609010300 HC COLONOSCOPY W/BIOPSY SINGLE/MULTIPLE: Performed by: INTERNAL MEDICINE

## 2024-11-05 PROCEDURE — 99233 SBSQ HOSP IP/OBS HIGH 50: CPT | Performed by: SURGERY

## 2024-11-05 PROCEDURE — 7100000010 HC PHASE II RECOVERY - FIRST 15 MIN: Performed by: INTERNAL MEDICINE

## 2024-11-05 PROCEDURE — 85014 HEMATOCRIT: CPT

## 2024-11-05 PROCEDURE — 87449 NOS EACH ORGANISM AG IA: CPT

## 2024-11-05 PROCEDURE — 6370000000 HC RX 637 (ALT 250 FOR IP): Performed by: PHYSICIAN ASSISTANT

## 2024-11-05 PROCEDURE — 83605 ASSAY OF LACTIC ACID: CPT

## 2024-11-05 PROCEDURE — 0DBN8ZX EXCISION OF SIGMOID COLON, VIA NATURAL OR ARTIFICIAL OPENING ENDOSCOPIC, DIAGNOSTIC: ICD-10-PCS | Performed by: INTERNAL MEDICINE

## 2024-11-05 PROCEDURE — 85018 HEMOGLOBIN: CPT

## 2024-11-05 PROCEDURE — 2709999900 HC NON-CHARGEABLE SUPPLY: Performed by: INTERNAL MEDICINE

## 2024-11-05 PROCEDURE — 86706 HEP B SURFACE ANTIBODY: CPT

## 2024-11-05 PROCEDURE — 3700000000 HC ANESTHESIA ATTENDED CARE: Performed by: INTERNAL MEDICINE

## 2024-11-05 PROCEDURE — 88305 TISSUE EXAM BY PATHOLOGIST: CPT

## 2024-11-05 PROCEDURE — 85025 COMPLETE CBC W/AUTO DIFF WBC: CPT

## 2024-11-05 PROCEDURE — 87324 CLOSTRIDIUM AG IA: CPT

## 2024-11-05 PROCEDURE — 93306 TTE W/DOPPLER COMPLETE: CPT | Performed by: INTERNAL MEDICINE

## 2024-11-05 PROCEDURE — 36415 COLL VENOUS BLD VENIPUNCTURE: CPT

## 2024-11-05 PROCEDURE — C8929 TTE W OR WO FOL WCON,DOPPLER: HCPCS

## 2024-11-05 PROCEDURE — 80069 RENAL FUNCTION PANEL: CPT

## 2024-11-05 PROCEDURE — 99232 SBSQ HOSP IP/OBS MODERATE 35: CPT | Performed by: HOSPITALIST

## 2024-11-05 PROCEDURE — 3700000001 HC ADD 15 MINUTES (ANESTHESIA): Performed by: INTERNAL MEDICINE

## 2024-11-05 PROCEDURE — 7100000011 HC PHASE II RECOVERY - ADDTL 15 MIN: Performed by: INTERNAL MEDICINE

## 2024-11-05 PROCEDURE — 87506 IADNA-DNA/RNA PROBE TQ 6-11: CPT

## 2024-11-05 PROCEDURE — 6360000002 HC RX W HCPCS: Performed by: INTERNAL MEDICINE

## 2024-11-05 PROCEDURE — 80076 HEPATIC FUNCTION PANEL: CPT

## 2024-11-05 PROCEDURE — 6370000000 HC RX 637 (ALT 250 FOR IP): Performed by: INTERNAL MEDICINE

## 2024-11-05 PROCEDURE — 2500000003 HC RX 250 WO HCPCS: Performed by: NURSE ANESTHETIST, CERTIFIED REGISTERED

## 2024-11-05 PROCEDURE — 6360000004 HC RX CONTRAST MEDICATION: Performed by: INTERNAL MEDICINE

## 2024-11-05 RX ORDER — PROPOFOL 10 MG/ML
INJECTION, EMULSION INTRAVENOUS
Status: DISCONTINUED | OUTPATIENT
Start: 2024-11-05 | End: 2024-11-05 | Stop reason: SDUPTHER

## 2024-11-05 RX ORDER — LIDOCAINE HYDROCHLORIDE 20 MG/ML
INJECTION, SOLUTION INFILTRATION; PERINEURAL
Status: DISCONTINUED | OUTPATIENT
Start: 2024-11-05 | End: 2024-11-05 | Stop reason: SDUPTHER

## 2024-11-05 RX ORDER — HYDRALAZINE HYDROCHLORIDE 20 MG/ML
5 INJECTION INTRAMUSCULAR; INTRAVENOUS EVERY 8 HOURS PRN
Status: DISCONTINUED | OUTPATIENT
Start: 2024-11-05 | End: 2024-11-11 | Stop reason: HOSPADM

## 2024-11-05 RX ORDER — DEXTROSE MONOHYDRATE 50 MG/ML
INJECTION, SOLUTION INTRAVENOUS CONTINUOUS
Status: DISCONTINUED | OUTPATIENT
Start: 2024-11-05 | End: 2024-11-06

## 2024-11-05 RX ADMIN — CALCITRIOL CAPSULES 0.25 MCG 2 MCG: 0.25 CAPSULE ORAL at 08:25

## 2024-11-05 RX ADMIN — SODIUM CHLORIDE, PRESERVATIVE FREE 40 MG: 5 INJECTION INTRAVENOUS at 08:24

## 2024-11-05 RX ADMIN — TORSEMIDE 40 MG: 20 TABLET ORAL at 08:25

## 2024-11-05 RX ADMIN — SODIUM CHLORIDE, PRESERVATIVE FREE 10 ML: 5 INJECTION INTRAVENOUS at 07:41

## 2024-11-05 RX ADMIN — METRONIDAZOLE 500 MG: 500 INJECTION, SOLUTION INTRAVENOUS at 07:44

## 2024-11-05 RX ADMIN — METHOCARBAMOL TABLETS 500 MG: 500 TABLET, COATED ORAL at 19:21

## 2024-11-05 RX ADMIN — PROPOFOL 50 MG: 10 INJECTION, EMULSION INTRAVENOUS at 15:11

## 2024-11-05 RX ADMIN — MEROPENEM 1000 MG: 1 INJECTION INTRAVENOUS at 08:34

## 2024-11-05 RX ADMIN — AMIODARONE HYDROCHLORIDE 200 MG: 200 TABLET ORAL at 22:16

## 2024-11-05 RX ADMIN — METOPROLOL SUCCINATE 50 MG: 50 TABLET, EXTENDED RELEASE ORAL at 23:23

## 2024-11-05 RX ADMIN — PERFLUTREN 1.5 ML: 6.52 INJECTION, SUSPENSION INTRAVENOUS at 13:11

## 2024-11-05 RX ADMIN — DILTIAZEM HYDROCHLORIDE 300 MG: 300 CAPSULE, COATED, EXTENDED RELEASE ORAL at 22:16

## 2024-11-05 RX ADMIN — HYDRALAZINE HYDROCHLORIDE 5 MG: 20 INJECTION INTRAMUSCULAR; INTRAVENOUS at 18:07

## 2024-11-05 RX ADMIN — CALCITRIOL CAPSULES 0.25 MCG 2 MCG: 0.25 CAPSULE ORAL at 23:51

## 2024-11-05 RX ADMIN — SODIUM CHLORIDE 25 ML: 9 INJECTION, SOLUTION INTRAVENOUS at 07:44

## 2024-11-05 RX ADMIN — NORTRIPTYLINE HYDROCHLORIDE 25 MG: 25 CAPSULE ORAL at 23:22

## 2024-11-05 RX ADMIN — DEXTROSE MONOHYDRATE 125 ML: 100 INJECTION, SOLUTION INTRAVENOUS at 11:23

## 2024-11-05 RX ADMIN — DEXTROSE MONOHYDRATE: 50 INJECTION, SOLUTION INTRAVENOUS at 13:37

## 2024-11-05 RX ADMIN — METRONIDAZOLE 500 MG: 500 INJECTION, SOLUTION INTRAVENOUS at 00:48

## 2024-11-05 RX ADMIN — HYDROMORPHONE HYDROCHLORIDE 0.5 MG: 1 INJECTION, SOLUTION INTRAMUSCULAR; INTRAVENOUS; SUBCUTANEOUS at 16:38

## 2024-11-05 RX ADMIN — DIVALPROEX SODIUM 125 MG: 125 TABLET, DELAYED RELEASE ORAL at 23:22

## 2024-11-05 RX ADMIN — SODIUM CHLORIDE, PRESERVATIVE FREE 10 ML: 5 INJECTION INTRAVENOUS at 00:52

## 2024-11-05 RX ADMIN — SODIUM CHLORIDE, PRESERVATIVE FREE 40 MG: 5 INJECTION INTRAVENOUS at 21:14

## 2024-11-05 RX ADMIN — SODIUM CHLORIDE, PRESERVATIVE FREE 10 ML: 5 INJECTION INTRAVENOUS at 21:14

## 2024-11-05 RX ADMIN — LIDOCAINE HYDROCHLORIDE 50 MG: 20 INJECTION, SOLUTION INFILTRATION; PERINEURAL at 15:11

## 2024-11-05 RX ADMIN — HYDROMORPHONE HYDROCHLORIDE 0.5 MG: 1 INJECTION, SOLUTION INTRAMUSCULAR; INTRAVENOUS; SUBCUTANEOUS at 21:10

## 2024-11-05 RX ADMIN — SODIUM CHLORIDE, PRESERVATIVE FREE 10 ML: 5 INJECTION INTRAVENOUS at 19:23

## 2024-11-05 RX ADMIN — DEXTROSE MONOHYDRATE 125 ML: 100 INJECTION, SOLUTION INTRAVENOUS at 19:21

## 2024-11-05 RX ADMIN — ATORVASTATIN CALCIUM 40 MG: 40 TABLET, FILM COATED ORAL at 22:17

## 2024-11-05 RX ADMIN — SODIUM CHLORIDE 25 ML: 9 INJECTION, SOLUTION INTRAVENOUS at 00:47

## 2024-11-05 RX ADMIN — PROPOFOL 50 MG: 10 INJECTION, EMULSION INTRAVENOUS at 15:15

## 2024-11-05 RX ADMIN — HYDROMORPHONE HYDROCHLORIDE 0.5 MG: 1 INJECTION, SOLUTION INTRAMUSCULAR; INTRAVENOUS; SUBCUTANEOUS at 03:40

## 2024-11-05 RX ADMIN — POLYETHYLENE GLYCOL-3350 AND ELECTROLYTES 4000 ML: 236; 6.74; 5.86; 2.97; 22.74 POWDER, FOR SOLUTION ORAL at 09:02

## 2024-11-05 RX ADMIN — AMIODARONE HYDROCHLORIDE 200 MG: 200 TABLET ORAL at 08:25

## 2024-11-05 RX ADMIN — CETIRIZINE HYDROCHLORIDE 10 MG: 10 TABLET, FILM COATED ORAL at 08:27

## 2024-11-05 RX ADMIN — HYDROMORPHONE HYDROCHLORIDE 0.5 MG: 1 INJECTION, SOLUTION INTRAMUSCULAR; INTRAVENOUS; SUBCUTANEOUS at 07:40

## 2024-11-05 RX ADMIN — SODIUM CHLORIDE, PRESERVATIVE FREE 10 ML: 5 INJECTION INTRAVENOUS at 03:39

## 2024-11-05 ASSESSMENT — PAIN DESCRIPTION - DESCRIPTORS
DESCRIPTORS: ACHING

## 2024-11-05 ASSESSMENT — PAIN DESCRIPTION - ORIENTATION
ORIENTATION: OTHER (COMMENT)
ORIENTATION: LEFT;RIGHT;MID;UPPER
ORIENTATION: RIGHT;LEFT;MID;LOWER
ORIENTATION: MID;RIGHT;LEFT;UPPER
ORIENTATION: RIGHT;LEFT;UPPER;LOWER
ORIENTATION: MID
ORIENTATION: LEFT;RIGHT;MID;UPPER;LOWER

## 2024-11-05 ASSESSMENT — PAIN DESCRIPTION - PAIN TYPE
TYPE: ACUTE PAIN

## 2024-11-05 ASSESSMENT — PAIN DESCRIPTION - LOCATION
LOCATION: ABDOMEN

## 2024-11-05 ASSESSMENT — PAIN - FUNCTIONAL ASSESSMENT
PAIN_FUNCTIONAL_ASSESSMENT: ACTIVITIES ARE NOT PREVENTED
PAIN_FUNCTIONAL_ASSESSMENT: 0-10
PAIN_FUNCTIONAL_ASSESSMENT: ACTIVITIES ARE NOT PREVENTED
PAIN_FUNCTIONAL_ASSESSMENT: NONE - DENIES PAIN
PAIN_FUNCTIONAL_ASSESSMENT: ACTIVITIES ARE NOT PREVENTED
PAIN_FUNCTIONAL_ASSESSMENT: PREVENTS OR INTERFERES SOME ACTIVE ACTIVITIES AND ADLS
PAIN_FUNCTIONAL_ASSESSMENT: ACTIVITIES ARE NOT PREVENTED

## 2024-11-05 ASSESSMENT — PAIN DESCRIPTION - FREQUENCY
FREQUENCY: CONTINUOUS
FREQUENCY: CONTINUOUS
FREQUENCY: INTERMITTENT
FREQUENCY: INTERMITTENT

## 2024-11-05 ASSESSMENT — PAIN SCALES - GENERAL
PAINLEVEL_OUTOF10: 9
PAINLEVEL_OUTOF10: 7
PAINLEVEL_OUTOF10: 0
PAINLEVEL_OUTOF10: 5
PAINLEVEL_OUTOF10: 9
PAINLEVEL_OUTOF10: 8
PAINLEVEL_OUTOF10: 10
PAINLEVEL_OUTOF10: 9
PAINLEVEL_OUTOF10: 8
PAINLEVEL_OUTOF10: 10
PAINLEVEL_OUTOF10: 7

## 2024-11-05 ASSESSMENT — PAIN DESCRIPTION - ONSET
ONSET: ON-GOING

## 2024-11-05 NOTE — PROCEDURES
recovery room in good condition.    Complications: none  EBL: None    Findings:  The prep was suboptimal limiting the exam.  The scope was advanced up to descending colon.  Severe patchy erythematous mucosa with ulcerations seen in the sigmoid and descending colon up to the extent of exam, biopsy obtained, most likely appears to have ischemic colitis.  Small polyps seen in sigmoid colon and rectum but not removed.  Retroflexion not performed    Impression:    Severe patchy erythematous mucosa with ulcerations seen in the sigmoid and descending colon up to the extent of exam, biopsy obtained, most likely appears to have ischemic colitis.    Small polyps seen in sigmoid colon and rectum but not removed.       Recommendations:    Follow-up pathology results  Continue n.p.o. except for ice chips  Continue IV antibiotics  Continue with symptomatic and supportive care  Patient will need complete colonoscopy once stable and her symptoms improve electively as outpatient in 6 to 8 weeks            Dennys Roberson MD  [facility]  [phone]    11/5/2024

## 2024-11-05 NOTE — DIALYSIS
Treatment time: 3 hours  Net UF: 600 ml     Pre weight: 117.5 kg  Post weight:117 kg  EDW: TBD kg  Crit Line Used: Yes Ending Profile: C  Refill Present: unable to perform    Access used: R TDC    Access function: Well with  ml/min     Medications or blood products given: Hydralazine 5mg     Regular outpatient schedule: [facility]     Summary of response to treatment: Patient tolerated treatment well with asymptomatic hypertension. Patient terminated treatment 40 minutes early. Patient remained stable throughout entire treatment and upon the exiting the dialysis suite via transport.     Report given to Jasmyne Kinney RN and copy of dialysis treatment record placed in chart, to be scanned into EMR.

## 2024-11-05 NOTE — CARE COORDINATION
Case Management Assessment/ Note  Date/ Time of Note: 11/5/2024 10:35 AM  Note completed by: Dmitriy Moreno RN    If patient is discharged prior to next notation, then this note serves as note for discharge by case management.    Patient Name: [place] Saleem  YOB: 1955    Diagnosis:Rectal bleeding [K62.5]  Acute colitis [K52.9]  Patient Admission Status: Inpatient  Date of Admission:11/3/2024  4:25 PM    Length of Stay: 2 GLOS: GMLOS: 2 Readmission Risk Score: Readmission Risk Score: 20.5    ________________________________________________________________________________________    Discharge Plan: Long Term Care (LTC): [facility]  Pre-cert required for SNF: NO  COVID Result:    Lab Results   Component Value Date/Time    COVID19 Not Detected 09/07/2023 09:38 PM       Transportation PLAN for discharge: EMS transportation    Tentative discharge date: 2-3 days    Potential assistance Purchasing Medications: Potential Assistance Purchasing Medications: No  Does Patient want to participate in local refill/ meds to beds program?:      Current barriers to discharge: Medical complications and Medication managment  _______________________________________________________________________________________  Case Management Notes:   Patient continues medical treatment. DC per provider will be \"a few days\". Plan is to return back to [facility]. CM will set transport at discharge. CM will continue to follow.     [place] and her family were provided with choice of provider; she and her family are in agreement with the discharge plan.    Emergency Contacts:  Extended Emergency Contact Information  Primary Emergency Contact: Dedra Loaiza  Home Phone: [phone]  Relation: Child  Secondary Emergency Contact: Mitzy Saleem  Home Phone: [phone]  Mobile Phone: [phone]  Relation: Child    Care Transition Patient: No    IMM Status:      JULI Amaya, RN    [facility]  Ph: [phone]

## 2024-11-06 PROBLEM — Z51.5 ENCOUNTER FOR PALLIATIVE CARE: Status: ACTIVE | Noted: 2024-11-06

## 2024-11-06 PROBLEM — K55.9 ISCHEMIC COLITIS (HCC): Status: ACTIVE | Noted: 2024-11-06

## 2024-11-06 LAB
ANION GAP SERPL CALCULATED.3IONS-SCNC: 12 MMOL/L (ref 3–16)
BACTERIA UR CULT: ABNORMAL
BASOPHILS # BLD: 0.1 K/UL (ref 0–0.2)
BASOPHILS NFR BLD: 0.4 %
BUN SERPL-MCNC: 24 MG/DL (ref 7–20)
CALCIUM SERPL-MCNC: 7.7 MG/DL (ref 8.3–10.6)
CHLORIDE SERPL-SCNC: 96 MMOL/L (ref 99–110)
CO2 SERPL-SCNC: 21 MMOL/L (ref 21–32)
CREAT SERPL-MCNC: 7.6 MG/DL (ref 0.6–1.2)
DEPRECATED RDW RBC AUTO: 22.1 % (ref 12.4–15.4)
EOSINOPHIL # BLD: 0.9 K/UL (ref 0–0.6)
EOSINOPHIL NFR BLD: 6 %
GFR SERPLBLD CREATININE-BSD FMLA CKD-EPI: 5 ML/MIN/{1.73_M2}
GI PATHOGENS PNL STL NAA+PROBE: NORMAL
GLUCOSE BLD-MCNC: 100 MG/DL (ref 70–99)
GLUCOSE BLD-MCNC: 103 MG/DL (ref 70–99)
GLUCOSE BLD-MCNC: 84 MG/DL (ref 70–99)
GLUCOSE BLD-MCNC: 92 MG/DL (ref 70–99)
GLUCOSE BLD-MCNC: 94 MG/DL (ref 70–99)
GLUCOSE BLD-MCNC: 96 MG/DL (ref 70–99)
GLUCOSE SERPL-MCNC: 95 MG/DL (ref 70–99)
HBV SURFACE AB SERPL IA-ACNC: <3.5 MIU/ML
HCT VFR BLD AUTO: 31.2 % (ref 36–48)
HCT VFR BLD AUTO: 33.4 % (ref 36–48)
HGB BLD-MCNC: 10.2 G/DL (ref 12–16)
HGB BLD-MCNC: 10.7 G/DL (ref 12–16)
LYMPHOCYTES # BLD: 1 K/UL (ref 1–5.1)
LYMPHOCYTES NFR BLD: 7.1 %
MAGNESIUM SERPL-MCNC: 2.23 MG/DL (ref 1.8–2.4)
MCH RBC QN AUTO: 29.7 PG (ref 26–34)
MCHC RBC AUTO-ENTMCNC: 32.6 G/DL (ref 31–36)
MCV RBC AUTO: 91.2 FL (ref 80–100)
MONOCYTES # BLD: 0.9 K/UL (ref 0–1.3)
MONOCYTES NFR BLD: 6.1 %
NEUTROPHILS # BLD: 11.3 K/UL (ref 1.7–7.7)
NEUTROPHILS NFR BLD: 80.4 %
ORGANISM: ABNORMAL
PERFORMED ON: ABNORMAL
PERFORMED ON: ABNORMAL
PERFORMED ON: NORMAL
PLATELET # BLD AUTO: 146 K/UL (ref 135–450)
PMV BLD AUTO: 8.3 FL (ref 5–10.5)
POTASSIUM SERPL-SCNC: 3.9 MMOL/L (ref 3.5–5.1)
RBC # BLD AUTO: 3.42 M/UL (ref 4–5.2)
SODIUM SERPL-SCNC: 129 MMOL/L (ref 136–145)
WBC # BLD AUTO: 14.1 K/UL (ref 4–11)

## 2024-11-06 PROCEDURE — 6370000000 HC RX 637 (ALT 250 FOR IP): Performed by: INTERNAL MEDICINE

## 2024-11-06 PROCEDURE — 2580000003 HC RX 258: Performed by: INTERNAL MEDICINE

## 2024-11-06 PROCEDURE — 80048 BASIC METABOLIC PNL TOTAL CA: CPT

## 2024-11-06 PROCEDURE — 6360000002 HC RX W HCPCS: Performed by: INTERNAL MEDICINE

## 2024-11-06 PROCEDURE — 2060000000 HC ICU INTERMEDIATE R&B

## 2024-11-06 PROCEDURE — 83735 ASSAY OF MAGNESIUM: CPT

## 2024-11-06 PROCEDURE — 85018 HEMOGLOBIN: CPT

## 2024-11-06 PROCEDURE — 85014 HEMATOCRIT: CPT

## 2024-11-06 PROCEDURE — 36415 COLL VENOUS BLD VENIPUNCTURE: CPT

## 2024-11-06 PROCEDURE — 99232 SBSQ HOSP IP/OBS MODERATE 35: CPT | Performed by: HOSPITALIST

## 2024-11-06 PROCEDURE — 99222 1ST HOSP IP/OBS MODERATE 55: CPT | Performed by: NURSE PRACTITIONER

## 2024-11-06 PROCEDURE — 85025 COMPLETE CBC W/AUTO DIFF WBC: CPT

## 2024-11-06 PROCEDURE — 99232 SBSQ HOSP IP/OBS MODERATE 35: CPT | Performed by: SURGERY

## 2024-11-06 PROCEDURE — 2580000003 HC RX 258: Performed by: HOSPITALIST

## 2024-11-06 RX ADMIN — CETIRIZINE HYDROCHLORIDE 10 MG: 10 TABLET, FILM COATED ORAL at 08:49

## 2024-11-06 RX ADMIN — MEROPENEM 500 MG: 500 INJECTION, POWDER, FOR SOLUTION INTRAVENOUS at 08:54

## 2024-11-06 RX ADMIN — CALCITRIOL CAPSULES 0.25 MCG 2 MCG: 0.25 CAPSULE ORAL at 08:46

## 2024-11-06 RX ADMIN — SODIUM CHLORIDE, PRESERVATIVE FREE 10 ML: 5 INJECTION INTRAVENOUS at 20:16

## 2024-11-06 RX ADMIN — SODIUM CHLORIDE, PRESERVATIVE FREE 10 ML: 5 INJECTION INTRAVENOUS at 08:52

## 2024-11-06 RX ADMIN — SODIUM CHLORIDE, PRESERVATIVE FREE 40 MG: 5 INJECTION INTRAVENOUS at 08:50

## 2024-11-06 RX ADMIN — HYDROMORPHONE HYDROCHLORIDE 0.5 MG: 1 INJECTION, SOLUTION INTRAMUSCULAR; INTRAVENOUS; SUBCUTANEOUS at 14:12

## 2024-11-06 RX ADMIN — TORSEMIDE 40 MG: 20 TABLET ORAL at 08:49

## 2024-11-06 RX ADMIN — SODIUM CHLORIDE, PRESERVATIVE FREE 40 MG: 5 INJECTION INTRAVENOUS at 20:24

## 2024-11-06 RX ADMIN — HYDROMORPHONE HYDROCHLORIDE 0.5 MG: 1 INJECTION, SOLUTION INTRAMUSCULAR; INTRAVENOUS; SUBCUTANEOUS at 01:53

## 2024-11-06 RX ADMIN — Medication 6 MG: at 21:52

## 2024-11-06 RX ADMIN — HYDROMORPHONE HYDROCHLORIDE 0.5 MG: 1 INJECTION, SOLUTION INTRAMUSCULAR; INTRAVENOUS; SUBCUTANEOUS at 06:41

## 2024-11-06 RX ADMIN — AMIODARONE HYDROCHLORIDE 200 MG: 200 TABLET ORAL at 08:48

## 2024-11-06 RX ADMIN — NORTRIPTYLINE HYDROCHLORIDE 25 MG: 25 CAPSULE ORAL at 21:52

## 2024-11-06 RX ADMIN — METHOCARBAMOL TABLETS 500 MG: 500 TABLET, COATED ORAL at 05:11

## 2024-11-06 RX ADMIN — METOPROLOL SUCCINATE 50 MG: 50 TABLET, EXTENDED RELEASE ORAL at 21:52

## 2024-11-06 RX ADMIN — DIVALPROEX SODIUM 125 MG: 125 TABLET, DELAYED RELEASE ORAL at 21:52

## 2024-11-06 RX ADMIN — ATORVASTATIN CALCIUM 40 MG: 40 TABLET, FILM COATED ORAL at 21:52

## 2024-11-06 RX ADMIN — DILTIAZEM HYDROCHLORIDE 300 MG: 300 CAPSULE, COATED, EXTENDED RELEASE ORAL at 20:33

## 2024-11-06 RX ADMIN — METHOCARBAMOL TABLETS 500 MG: 500 TABLET, COATED ORAL at 14:11

## 2024-11-06 RX ADMIN — SODIUM CHLORIDE, PRESERVATIVE FREE 10 ML: 5 INJECTION INTRAVENOUS at 01:52

## 2024-11-06 RX ADMIN — HYDROMORPHONE HYDROCHLORIDE 0.5 MG: 1 INJECTION, SOLUTION INTRAMUSCULAR; INTRAVENOUS; SUBCUTANEOUS at 20:20

## 2024-11-06 RX ADMIN — SODIUM CHLORIDE, PRESERVATIVE FREE 10 ML: 5 INJECTION INTRAVENOUS at 06:40

## 2024-11-06 RX ADMIN — METHOCARBAMOL TABLETS 500 MG: 500 TABLET, COATED ORAL at 21:52

## 2024-11-06 RX ADMIN — CALCIUM CARBONATE 1000 MG: 500 TABLET, CHEWABLE ORAL at 20:33

## 2024-11-06 RX ADMIN — CALCITRIOL CAPSULES 0.25 MCG 0.5 MCG: 0.25 CAPSULE ORAL at 21:57

## 2024-11-06 RX ADMIN — DEXTROSE AND SODIUM CHLORIDE: 5; 450 INJECTION, SOLUTION INTRAVENOUS at 14:30

## 2024-11-06 RX ADMIN — ONDANSETRON 4 MG: 2 INJECTION INTRAMUSCULAR; INTRAVENOUS at 00:11

## 2024-11-06 RX ADMIN — AMIODARONE HYDROCHLORIDE 200 MG: 200 TABLET ORAL at 20:33

## 2024-11-06 ASSESSMENT — PAIN DESCRIPTION - ORIENTATION
ORIENTATION: RIGHT;LEFT;MID;LOWER;UPPER
ORIENTATION: MID;RIGHT;LEFT;LOWER;UPPER
ORIENTATION: OTHER (COMMENT)
ORIENTATION: MID;LOWER;UPPER;RIGHT;LEFT
ORIENTATION: RIGHT;LEFT;MID;LOWER
ORIENTATION: MID

## 2024-11-06 ASSESSMENT — PAIN DESCRIPTION - ONSET
ONSET: ON-GOING

## 2024-11-06 ASSESSMENT — PAIN - FUNCTIONAL ASSESSMENT
PAIN_FUNCTIONAL_ASSESSMENT: ACTIVITIES ARE NOT PREVENTED
PAIN_FUNCTIONAL_ASSESSMENT: PREVENTS OR INTERFERES SOME ACTIVE ACTIVITIES AND ADLS
PAIN_FUNCTIONAL_ASSESSMENT: ACTIVITIES ARE NOT PREVENTED
PAIN_FUNCTIONAL_ASSESSMENT: PREVENTS OR INTERFERES SOME ACTIVE ACTIVITIES AND ADLS

## 2024-11-06 ASSESSMENT — PAIN SCALES - GENERAL
PAINLEVEL_OUTOF10: 10
PAINLEVEL_OUTOF10: 0
PAINLEVEL_OUTOF10: 7
PAINLEVEL_OUTOF10: 0
PAINLEVEL_OUTOF10: 9
PAINLEVEL_OUTOF10: 6
PAINLEVEL_OUTOF10: 9
PAINLEVEL_OUTOF10: 0
PAINLEVEL_OUTOF10: 7
PAINLEVEL_OUTOF10: 8

## 2024-11-06 ASSESSMENT — PAIN DESCRIPTION - LOCATION
LOCATION: ABDOMEN
LOCATION: GENERALIZED

## 2024-11-06 ASSESSMENT — PAIN DESCRIPTION - FREQUENCY
FREQUENCY: INTERMITTENT
FREQUENCY: CONTINUOUS
FREQUENCY: INTERMITTENT

## 2024-11-06 ASSESSMENT — PAIN DESCRIPTION - DESCRIPTORS
DESCRIPTORS: ACHING
DESCRIPTORS: ACHING
DESCRIPTORS: ACHING;SHARP
DESCRIPTORS: ACHING
DESCRIPTORS: ACHING
DESCRIPTORS: ACHING;SHARP

## 2024-11-06 ASSESSMENT — PAIN DESCRIPTION - PAIN TYPE
TYPE: ACUTE PAIN
TYPE: CHRONIC PAIN
TYPE: ACUTE PAIN

## 2024-11-06 ASSESSMENT — PAIN SCALES - WONG BAKER: WONGBAKER_NUMERICALRESPONSE: NO HURT

## 2024-11-06 NOTE — CONSULTS
Nephrology Note                                                                                                                                                                                                                                                                                                                                                               Office : [phone]     Fax :[phone]    Patient's Name: [place] Saleem    11/5/2024    Reason for Consult:  Hemodialysis   Requesting Physician:  Kim Liu MD  Chief Complaint:    Chief Complaint   Patient presents with    Rectal Bleeding     Since 10a this morning.  Two occurrences       Assessment:    # Acute colitis   - Work up in progress     # ESRD   - on hemodialysis T/Th/Sat    # HTN   - BP in fair control    # Anemia  - EPO qHD    # Acid- base/ Electrolyte imbalance     # Enlarging abdominal aortic aneurysm   - S/p emergent EVAR with goretex graft 9/8    # Left renal cyst  - Not a surgical candidate, previously seen by urology    Plan:  - Continue HD TTS schedule   - Renal diet  - MBD management   - Dose meds to GFR < 10  - Continue to monitor renal labs     History of Present Ilness:    69-year-old female with history of ESRD on hemodialysis, hypertension, diabetes, AAA s/p repair, COPD admitted with     abdominal pain and rectal bleeding. CT angiogram 11/3 showed long segment wall thickening from proximal transverse colon to rectosigmoid, complex left renal masses, aortoiliac stent in place, small blush seen within the excluded aneurysm may represent small endoleak.     Interval hx:         Past Medical History:   Diagnosis Date    Asthma     Chronic pain     Chronic, continuous use of opioids     COPD (chronic obstructive pulmonary disease) (HCC)     Dental disease     Diabetes mellitus (HCC)     Gout     Hearing loss     Hyperlipidemia     Hypertension     Other disorders of kidney and ureter     Polycystic kidney disease     
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 OR    APPENDECTOMY      CHOLECYSTECTOMY      IR TUNNELED CATHETER PLACEMENT GREATER THAN 5 YEARS  06/01/2021    IR TUNNELED CATHETER PLACEMENT GREATER THAN 5 YEARS 6/1/2021 [facility] SPECIAL PROCEDURES    KIDNEY CYST REMOVAL         Family History   Problem Relation Age of Onset    Unknown Mother     Unknown Father         reports that she has been smoking cigarettes. She has a 25 pack-year smoking history. She has never used smokeless tobacco. She reports that she does not currently use alcohol. She reports that she does not use drugs.    Allergies:  Celecoxib, Diclofenac, Morphine, Naproxen, Paroxetine, Quetiapine fumarate, Sertraline, Trazodone and nefazodone, Abilify [aripiprazole], Baclofen, Fentanyl, Hydrocodone, Ibuprofen, Mirtazapine, and Penicillins    Current Medications:    albuterol (PROVENTIL) (2.5 MG/3ML) 0.083% nebulizer solution 2.5 mg, Q4H PRN  amiodarone (CORDARONE) tablet 200 mg, BID  atorvastatin (LIPITOR) tablet 40 mg, Nightly  calcitRIOL (ROCALTROL) capsule 2 mcg, BID  dilTIAZem (CARDIZEM CD) extended release capsule 300 mg, Nightly  calcium carbonate (TUMS) chewable tablet 1,000 mg, TID  diphenhydrAMINE (BENADRYL) tablet 25 mg, Q4H PRN  divalproex (DEPAKOTE) DR tablet 125 mg, Nightly  cetirizine (ZYRTEC) tablet 10 mg, Daily  methocarbamol (ROBAXIN) tablet 500 mg, Q6H PRN  metoprolol succinate (TOPROL XL) extended release tablet 50 mg, Nightly  sodium chloride flush 0.9 % injection 5-40 mL, 2 times per day  sodium chloride flush 0.9 % injection 5-40 mL, PRN  0.9 % sodium chloride infusion, PRN  ondansetron (ZOFRAN-ODT) disintegrating tablet 4 mg, Q8H PRN   Or  ondansetron (ZOFRAN) 
negative.    Physical exam:    Vitals:    11/03/24 1635 11/03/24 1706 11/03/24 1900   BP:  (!) 151/78 (!) 185/92   Pulse:  71 70   Resp:  18 17   Temp: 99 °F (37.2 °C) 99 °F (37.2 °C)    TempSrc: Oral Oral    SpO2:  98% 99%       General appearance: alert, no acute distress, grooming appropriate  Eyes: no scleral icterus  Neck: trachea midline, neck supple  Respiratory: Normal effort with no accessory muscle use on RA  Cardiovascular: RRR  Abdomen: soft, obese, very tender on palpation of epigastric and bilateral LQ non-distended, no rigidity. Patient distractible and able to turn in bed without reported abdominal pain. Jarring of bed did not prompt abdominal pain  Rectal: Unable to perform digital rectal exam due to patient refusal. Bright red blood noted on undergarment  Skin: warm and dry, no rashes  Extremities: no edema, no cyanosis  Neuro: A&Ox3, no focal deficits    Labs:    CBC:   Recent Labs     11/03/24  1735   WBC 14.7*   HGB 11.4*   HCT 34.6*   MCV 91.4        BMP:   Recent Labs     11/03/24  1735 11/03/24  1900   *  --    K 6.0* 4.2   CL 94*  --    CO2 23  --    BUN 24*  --    CREATININE 7.5*  --      PT/INR: No results for input(s): \"PROTIME\", \"INR\" in the last 72 hours.  APTT: No results for input(s): \"APTT\" in the last 72 hours.  Liver Profile:   Lab Results   Component Value Date/Time    AST 67 11/03/2024 05:35 PM    ALT 17 11/03/2024 05:35 PM    BILIDIR 0.2 11/03/2024 05:35 PM    BILITOT 0.4 11/03/2024 05:35 PM    ALKPHOS 102 11/03/2024 05:35 PM    PROTEIN 8.1 11/03/2024 05:35 PM     Lab Results   Component Value Date/Time    CHOL 170 06/10/2019 12:12 PM    HDL 40 06/10/2019 12:12 PM    TRIG 132 06/10/2019 12:12 PM     UA:   Lab Results   Component Value Date/Time    COLORU Yellow 09/08/2023 08:15 AM    PHUR 8.0 09/08/2023 08:15 AM    WBCUA 0-2 09/08/2023 08:15 AM    RBCUA 0-2 09/08/2023 08:15 AM    MUCUS 2+ 05/29/2021 06:00 AM    BACTERIA 1+ 02/08/2022 09:33 AM    CLARITYU Clear 
succinate (TOPROL XL) extended release tablet 50 mg, Nightly  sodium chloride flush 0.9 % injection 5-40 mL, 2 times per day  sodium chloride flush 0.9 % injection 5-40 mL, PRN  0.9 % sodium chloride infusion, PRN  ondansetron (ZOFRAN-ODT) disintegrating tablet 4 mg, Q8H PRN   Or  ondansetron (ZOFRAN) injection 4 mg, Q6H PRN  acetaminophen (TYLENOL) tablet 650 mg, Q6H PRN   Or  acetaminophen (TYLENOL) suppository 650 mg, Q6H PRN  0.9 % sodium chloride infusion, Continuous  pantoprazole (PROTONIX) 40 mg in sodium chloride (PF) 0.9 % 10 mL injection, Q12H  [START ON 11/4/2024] cefTRIAXone (ROCEPHIN) 2,000 mg in sterile water 20 mL IV syringe, Q24H  [START ON 11/4/2024] metroNIDAZOLE (FLAGYL) 500 mg in 0.9% NaCl 100 mL IVPB premix, Q8H  glucose chewable tablet 16 g, PRN  dextrose bolus 10% 125 mL, PRN   Or  dextrose bolus 10% 250 mL, PRN  glucagon injection 1 mg, PRN  dextrose 10 % infusion, Continuous PRN  [START ON 11/4/2024] insulin glargine (LANTUS) injection vial 6 Units, Nightly  insulin lispro (HUMALOG,ADMELOG) injection vial 0-4 Units, 4x Daily AC & HS  nortriptyline (PAMELOR) capsule 25 mg, Nightly  [START ON 11/4/2024] polyethylene glycol (GLYCOLAX) packet 17 g, QAM  senna (SENOKOT) tablet 8.6 mg, Nightly  [START ON 11/4/2024] torsemide (DEMADEX) tablet 40 mg, QAM  [START ON 11/4/2024] sevelamer (RENVELA) tablet 800 mg, TID WC  HYDROmorphone (DILAUDID) injection 0.5 mg, Q4H PRN  melatonin tablet 6 mg, Nightly PRN        Family History:   Family History   Problem Relation Age of Onset    Unknown Mother     Unknown Father        Social History:   TOBACCO:   reports that she has been smoking cigarettes. She has a 25 pack-year smoking history. She has never used smokeless tobacco.  ETOH:   reports that she does not currently use alcohol.  DRUGS:   reports no history of drug use.    Review of Systems:   A 14 point review of systems was conducted, significant findings as noted above. All other systems 
There is no distension.      Palpations: Abdomen is soft.   Musculoskeletal:         General: No swelling.   Skin:     General: Skin is warm and dry.   Neurological:      Mental Status: She is alert and oriented to person, place, and time.          Palliative Performance Scale:  [] 60% Ambulation reduced; Significant disease; Can't do hobbies/housework; intake normal or reduced; occasional assist; LOC full/confusion  [] 50% Mainly sit/lie; Extensive disease; Can't do any work; Considerable assist; intake normal  Or reduced; LOC full/confusion  [x] 40% Mainly in bed; Extensive disease; Mainly assist; intake normal or reduced; occasional assist; LOC full/confusion  [] 30% Bed Bound; Extensive disease; Total care; intake reduced; LOC full/confusion  [] 20% Bed Bound; Extensive disease; Total care; intake minimal; Drowsy/coma  [] 10% Bed Bound; Extensive disease; Total care; Mouth care only; Drowsy/coma  [] 0% Death      Vitals:    /75   Pulse 73   Temp 98.8 °F (37.1 °C) (Oral)   Resp 20   Ht 1.676 m (5' 6\")   Wt 119.9 kg (264 lb 5.3 oz)   SpO2 96%   BMI 42.66 kg/m²     Labs:    BMP:   Recent Labs     11/04/24 0236 11/05/24 0333 11/06/24  0508   * 133* 129*   K 4.5 4.9  4.9 3.9   CL 98* 99 96*   CO2 21 21 21   BUN 27* 35* 24*   CREATININE 8.1* 9.6* 7.6*   GLUCOSE 92 75 95   PHOS  --  5.9*  --      CBC:   Recent Labs     11/03/24 1735 11/04/24 0236 11/05/24 0333 11/05/24 0924 11/06/24  0508   WBC 14.7*  --  15.9*  --  14.1*   HGB 11.4*   < > 10.3* 11.7* 10.2*   HCT 34.6*   < > 32.3* 36.7 31.2*     --  139  --  146    < > = values in this interval not displayed.       LFT's:   Recent Labs     11/03/24 1735 11/05/24 0924   AST 67* 22   ALT 17 8*   BILITOT 0.4 0.3   ALKPHOS 102 117     Troponin: No results for input(s): \"TROPONINI\" in the last 72 hours.  BNP: No results for input(s): \"BNP\" in the last 72 hours.  ABGs: No results for input(s): \"PHART\", \"QLE8VOL\", \"PO2ART\" in the last 72 
Deferred  Musculoskeletal: No pitting edema of the lower legs.  Neurological: Gross memory appears intact.  Patient is alert and oriented      Recent Imaging:   CTA CHEST ABDOMEN PELVIS W CONTRAST  Narrative: EXAM: CTA CHEST ABDOMEN PELVIS W CONTRAST     CLINICAL  INDICATION: abdominal pain; prior AAA w/ repair r/o, rectal bleeding aortoenteric fistula    COMPARISON: 9/19/2023     TECHNIQUE: CT of the chest, abdomen and pelvis was performed with IV contrast according to the CT angiogram protocol with coronal and sagittal reformats.  MIP reformats    Unless otherwise specified, incidental findings do not require dedicated imaging follow-up.     This exam was performed according to our departmental dose-optimization program, which includes automated exposure control, adjustment of the mA and/or kV according to patient size and/or use of iterative reconstruction technique.    CONTRAST:     IV contrast per protocol.    FINDINGS:    CHEST: Aorta nonaneurysmal. No evidence of aortic dissection. The central pulmonary arteries appear patent. Heart size normal. Borderline prominent lymph nodes with paratracheal nodes, AP window nodes. No significant pleural effusions. Degenerative   changes of spine. Lungs demonstrate dependent atelectasis.    Abdomen/pelvis:    Gallbladder is surgically absent. The liver, spleen, adrenals and pancreas are unremarkable.    Kidneys appear atrophic. Multiple cysts are seen in the right kidney.    Left kidney demonstrates multiple cysts and indeterminate renal lesions with a large complex mixed attenuation partially calcified mass in the lower pole measuring 12.0 x 13.0 cm, proximally stable. There is a high attenuation interpole left renal lesion   measuring about 1.0 x 1.8 cm also approximately stable. No hydronephrosis.    No evidence of bowel obstruction. There is long segment wall thickening from the proximal transverse colon through the rectosigmoid colon. No evidence of appendicitis.. No

## 2024-11-06 NOTE — SIGNIFICANT EVENT
APRN notified by RN of patient with blood sugar 63 this evening.  -Patient treated with dextrose 10% per hypoglycemia protocol  -Repeat blood sugar 104  -Noted patient blood sugar trend 100 and below throughout the day today  -Patient continues n.p.o. secondary to colitis  -Lantus was held last evening secondary to hypoglycemia  -Will hold Lantus again this evening as blood sugars were low despite Lantus being held last evening  -Continue hypoglycemia protocol  -Further pending reeval by attending in a.m.    TARIQ Pichardo - NP

## 2024-11-06 NOTE — CARE COORDINATION
Case Management Assessment/ Note  Date/ Time of Note: 11/6/2024 10:48 AM  Note completed by: Dmitriy Moreno RN    If patient is discharged prior to next notation, then this note serves as note for discharge by case management.    Patient Name: [place] Saleem  YOB: 1955    Diagnosis:Rectal bleeding [K62.5]  Acute colitis [K52.9]  Patient Admission Status: Inpatient  Date of Admission:11/3/2024  4:25 PM    Length of Stay: 3 GLOS: GMLOS: 2 Readmission Risk Score: Readmission Risk Score: 19.5    ________________________________________________________________________________________    Discharge Plan: Long Term Care (LTC): [facility]    COVID Result:    Lab Results   Component Value Date/Time    COVID19 Not Detected 09/07/2023 09:38 PM       Transportation PLAN for discharge: EMS transportation    Tentative discharge date: tbd    Potential assistance Purchasing Medications: Potential Assistance Purchasing Medications: No  Does Patient want to participate in local refill/ meds to beds program?:      Current barriers to discharge: Medical complications and Medication managment    ________________________________________________________________________________________  Case Management Notes:   Patient continues with medical treatment in hospital. Plan is to go back to LTC at [facility]. Transportation at time of discharge is EMS setup by CM. CM will continue following.       [place] and her family were provided with choice of provider; she and her family are in agreement with the discharge plan.    Emergency Contacts:  Extended Emergency Contact Information  Primary Emergency Contact: Dedra Loaiza  Home Phone: [phone]  Relation: Child  Secondary Emergency Contact: Mitzy Saleem  Home Phone: [phone]  Mobile Phone: [phone]  Relation: Child    Care Transition Patient: No    IMM Status:      JULI Amaya, RN    [facility]  Ph: [phone]  Fax: [phone]

## 2024-11-06 NOTE — ANESTHESIA POSTPROCEDURE EVALUATION
Department of Anesthesiology  Postprocedure Note    Patient: [place] Saleem  MRN: [id number]  YOB: 1955  Date of evaluation: 11/6/2024    Procedure Summary       Date: 11/05/24 Room / Location: [place] [address] / [facility]    Anesthesia Start: 1503 Anesthesia Stop: 1526    Procedure: COLONOSCOPY BIOPSY/ STOOL ASPIRATE Diagnosis:       Rectal bleed      (Rectal bleed [K62.5])    Surgeons: Laura Roberson MD Responsible Provider: Jono Garza MD    Anesthesia Type: MAC ASA Status: 4            Anesthesia Type: No value filed.    Nichole Phase I: Nichole Score: 9    Nichole Phase II: Nichole Score: 10    Anesthesia Post Evaluation    Patient location during evaluation: PACU  Patient participation: complete - patient participated  Level of consciousness: awake  Pain score: 0  Airway patency: patent  Nausea & Vomiting: no nausea  Cardiovascular status: hemodynamically stable  Respiratory status: acceptable  Hydration status: stable  Pain management: adequate    No notable events documented.

## 2024-11-07 LAB
ALBUMIN SERPL-MCNC: 3.3 G/DL (ref 3.4–5)
ANION GAP SERPL CALCULATED.3IONS-SCNC: 15 MMOL/L (ref 3–16)
BASOPHILS # BLD: 0.1 K/UL (ref 0–0.2)
BASOPHILS NFR BLD: 0.4 %
BUN SERPL-MCNC: 30 MG/DL (ref 7–20)
CALCIUM SERPL-MCNC: 7.9 MG/DL (ref 8.3–10.6)
CHLORIDE SERPL-SCNC: 95 MMOL/L (ref 99–110)
CO2 SERPL-SCNC: 20 MMOL/L (ref 21–32)
CREAT SERPL-MCNC: 9.3 MG/DL (ref 0.6–1.2)
DEPRECATED RDW RBC AUTO: 21.3 % (ref 12.4–15.4)
EOSINOPHIL # BLD: 1.5 K/UL (ref 0–0.6)
EOSINOPHIL NFR BLD: 10.7 %
GFR SERPLBLD CREATININE-BSD FMLA CKD-EPI: 4 ML/MIN/{1.73_M2}
GLUCOSE BLD-MCNC: 103 MG/DL (ref 70–99)
GLUCOSE BLD-MCNC: 78 MG/DL (ref 70–99)
GLUCOSE BLD-MCNC: 80 MG/DL (ref 70–99)
GLUCOSE BLD-MCNC: 92 MG/DL (ref 70–99)
GLUCOSE BLD-MCNC: 94 MG/DL (ref 70–99)
GLUCOSE SERPL-MCNC: 84 MG/DL (ref 70–99)
HCT VFR BLD AUTO: 31.6 % (ref 36–48)
HGB BLD-MCNC: 10.6 G/DL (ref 12–16)
LYMPHOCYTES # BLD: 0.9 K/UL (ref 1–5.1)
LYMPHOCYTES NFR BLD: 6.2 %
MAGNESIUM SERPL-MCNC: 2.37 MG/DL (ref 1.8–2.4)
MCH RBC QN AUTO: 30.2 PG (ref 26–34)
MCHC RBC AUTO-ENTMCNC: 33.4 G/DL (ref 31–36)
MCV RBC AUTO: 90.3 FL (ref 80–100)
MONOCYTES # BLD: 0.9 K/UL (ref 0–1.3)
MONOCYTES NFR BLD: 6.7 %
NEUTROPHILS # BLD: 10.5 K/UL (ref 1.7–7.7)
NEUTROPHILS NFR BLD: 76 %
PERFORMED ON: ABNORMAL
PERFORMED ON: NORMAL
PHOSPHATE SERPL-MCNC: 5.7 MG/DL (ref 2.5–4.9)
PLATELET # BLD AUTO: 173 K/UL (ref 135–450)
PMV BLD AUTO: 6.9 FL (ref 5–10.5)
POTASSIUM SERPL-SCNC: 4.1 MMOL/L (ref 3.5–5.1)
RBC # BLD AUTO: 3.5 M/UL (ref 4–5.2)
SODIUM SERPL-SCNC: 130 MMOL/L (ref 136–145)
WBC # BLD AUTO: 13.8 K/UL (ref 4–11)

## 2024-11-07 PROCEDURE — 6360000002 HC RX W HCPCS: Performed by: INTERNAL MEDICINE

## 2024-11-07 PROCEDURE — 2060000000 HC ICU INTERMEDIATE R&B

## 2024-11-07 PROCEDURE — 85025 COMPLETE CBC W/AUTO DIFF WBC: CPT

## 2024-11-07 PROCEDURE — 36415 COLL VENOUS BLD VENIPUNCTURE: CPT

## 2024-11-07 PROCEDURE — 80069 RENAL FUNCTION PANEL: CPT

## 2024-11-07 PROCEDURE — 2580000003 HC RX 258: Performed by: INTERNAL MEDICINE

## 2024-11-07 PROCEDURE — 99231 SBSQ HOSP IP/OBS SF/LOW 25: CPT | Performed by: SURGERY

## 2024-11-07 PROCEDURE — 90935 HEMODIALYSIS ONE EVALUATION: CPT

## 2024-11-07 PROCEDURE — 6370000000 HC RX 637 (ALT 250 FOR IP): Performed by: INTERNAL MEDICINE

## 2024-11-07 PROCEDURE — 83735 ASSAY OF MAGNESIUM: CPT

## 2024-11-07 RX ORDER — HEPARIN SODIUM 1000 [USP'U]/ML
3600 INJECTION, SOLUTION INTRAVENOUS; SUBCUTANEOUS PRN
Status: DISCONTINUED | OUTPATIENT
Start: 2024-11-07 | End: 2024-11-11 | Stop reason: HOSPADM

## 2024-11-07 RX ADMIN — CALCITRIOL CAPSULES 0.25 MCG 2 MCG: 0.25 CAPSULE ORAL at 20:12

## 2024-11-07 RX ADMIN — ATORVASTATIN CALCIUM 40 MG: 40 TABLET, FILM COATED ORAL at 20:12

## 2024-11-07 RX ADMIN — MEROPENEM 500 MG: 500 INJECTION, POWDER, FOR SOLUTION INTRAVENOUS at 08:32

## 2024-11-07 RX ADMIN — CETIRIZINE HYDROCHLORIDE 10 MG: 10 TABLET, FILM COATED ORAL at 08:37

## 2024-11-07 RX ADMIN — HYDROMORPHONE HYDROCHLORIDE 0.5 MG: 1 INJECTION, SOLUTION INTRAMUSCULAR; INTRAVENOUS; SUBCUTANEOUS at 20:04

## 2024-11-07 RX ADMIN — HYDROMORPHONE HYDROCHLORIDE 0.5 MG: 1 INJECTION, SOLUTION INTRAMUSCULAR; INTRAVENOUS; SUBCUTANEOUS at 14:06

## 2024-11-07 RX ADMIN — ACETAMINOPHEN 650 MG: 325 TABLET ORAL at 17:33

## 2024-11-07 RX ADMIN — MEROPENEM 500 MG: 500 INJECTION, POWDER, FOR SOLUTION INTRAVENOUS at 14:14

## 2024-11-07 RX ADMIN — Medication 6 MG: at 20:12

## 2024-11-07 RX ADMIN — TORSEMIDE 40 MG: 20 TABLET ORAL at 08:37

## 2024-11-07 RX ADMIN — CALCIUM CARBONATE 1000 MG: 500 TABLET, CHEWABLE ORAL at 14:06

## 2024-11-07 RX ADMIN — CALCIUM CARBONATE 1000 MG: 500 TABLET, CHEWABLE ORAL at 20:12

## 2024-11-07 RX ADMIN — DILTIAZEM HYDROCHLORIDE 300 MG: 300 CAPSULE, COATED, EXTENDED RELEASE ORAL at 20:12

## 2024-11-07 RX ADMIN — SODIUM CHLORIDE, PRESERVATIVE FREE 10 ML: 5 INJECTION INTRAVENOUS at 20:15

## 2024-11-07 RX ADMIN — NORTRIPTYLINE HYDROCHLORIDE 25 MG: 25 CAPSULE ORAL at 20:14

## 2024-11-07 RX ADMIN — METOPROLOL SUCCINATE 50 MG: 50 TABLET, EXTENDED RELEASE ORAL at 20:12

## 2024-11-07 RX ADMIN — SEVELAMER CARBONATE 800 MG: 800 TABLET, FILM COATED ORAL at 14:05

## 2024-11-07 RX ADMIN — AMIODARONE HYDROCHLORIDE 200 MG: 200 TABLET ORAL at 08:37

## 2024-11-07 RX ADMIN — CALCITRIOL CAPSULES 0.25 MCG 2 MCG: 0.25 CAPSULE ORAL at 08:36

## 2024-11-07 RX ADMIN — SEVELAMER CARBONATE 800 MG: 800 TABLET, FILM COATED ORAL at 17:33

## 2024-11-07 RX ADMIN — SODIUM CHLORIDE, PRESERVATIVE FREE 40 MG: 5 INJECTION INTRAVENOUS at 08:39

## 2024-11-07 RX ADMIN — SODIUM CHLORIDE, PRESERVATIVE FREE 40 MG: 5 INJECTION INTRAVENOUS at 20:12

## 2024-11-07 RX ADMIN — CALCIUM CARBONATE 1000 MG: 500 TABLET, CHEWABLE ORAL at 08:39

## 2024-11-07 RX ADMIN — DIVALPROEX SODIUM 125 MG: 125 TABLET, DELAYED RELEASE ORAL at 20:19

## 2024-11-07 RX ADMIN — HYDROMORPHONE HYDROCHLORIDE 0.5 MG: 1 INJECTION, SOLUTION INTRAMUSCULAR; INTRAVENOUS; SUBCUTANEOUS at 00:28

## 2024-11-07 RX ADMIN — HYDROMORPHONE HYDROCHLORIDE 0.5 MG: 1 INJECTION, SOLUTION INTRAMUSCULAR; INTRAVENOUS; SUBCUTANEOUS at 04:49

## 2024-11-07 RX ADMIN — AMIODARONE HYDROCHLORIDE 200 MG: 200 TABLET ORAL at 20:12

## 2024-11-07 RX ADMIN — METHOCARBAMOL TABLETS 500 MG: 500 TABLET, COATED ORAL at 14:06

## 2024-11-07 RX ADMIN — SODIUM CHLORIDE, PRESERVATIVE FREE 10 ML: 5 INJECTION INTRAVENOUS at 08:40

## 2024-11-07 ASSESSMENT — PAIN SCALES - GENERAL
PAINLEVEL_OUTOF10: 8
PAINLEVEL_OUTOF10: 6
PAINLEVEL_OUTOF10: 0
PAINLEVEL_OUTOF10: 0
PAINLEVEL_OUTOF10: 3
PAINLEVEL_OUTOF10: 6
PAINLEVEL_OUTOF10: 8
PAINLEVEL_OUTOF10: 0
PAINLEVEL_OUTOF10: 8

## 2024-11-07 ASSESSMENT — PAIN DESCRIPTION - ORIENTATION
ORIENTATION: RIGHT;LEFT;ANTERIOR;POSTERIOR
ORIENTATION: RIGHT;LEFT;MID;LOWER;UPPER
ORIENTATION: RIGHT;POSTERIOR;ANTERIOR;LEFT
ORIENTATION: RIGHT;LEFT;ANTERIOR;POSTERIOR

## 2024-11-07 ASSESSMENT — PAIN DESCRIPTION - LOCATION
LOCATION: ABDOMEN
LOCATION: GENERALIZED
LOCATION: GENERALIZED
LOCATION: ABDOMEN
LOCATION: GENERALIZED

## 2024-11-07 ASSESSMENT — PAIN DESCRIPTION - PAIN TYPE
TYPE: ACUTE PAIN

## 2024-11-07 ASSESSMENT — PAIN SCALES - WONG BAKER
WONGBAKER_NUMERICALRESPONSE: NO HURT
WONGBAKER_NUMERICALRESPONSE: NO HURT

## 2024-11-07 ASSESSMENT — PAIN DESCRIPTION - DESCRIPTORS
DESCRIPTORS: ACHING;CRAMPING
DESCRIPTORS: ACHING
DESCRIPTORS: ACHING
DESCRIPTORS: ACHING;CRAMPING
DESCRIPTORS: ACHING;CRAMPING

## 2024-11-07 ASSESSMENT — PAIN - FUNCTIONAL ASSESSMENT
PAIN_FUNCTIONAL_ASSESSMENT: PREVENTS OR INTERFERES SOME ACTIVE ACTIVITIES AND ADLS
PAIN_FUNCTIONAL_ASSESSMENT: ACTIVITIES ARE NOT PREVENTED
PAIN_FUNCTIONAL_ASSESSMENT: PREVENTS OR INTERFERES SOME ACTIVE ACTIVITIES AND ADLS
PAIN_FUNCTIONAL_ASSESSMENT: ACTIVITIES ARE NOT PREVENTED
PAIN_FUNCTIONAL_ASSESSMENT: PREVENTS OR INTERFERES WITH ALL ACTIVE AND SOME PASSIVE ACTIVITIES

## 2024-11-07 ASSESSMENT — PAIN DESCRIPTION - ONSET
ONSET: ON-GOING

## 2024-11-07 ASSESSMENT — PAIN DESCRIPTION - FREQUENCY
FREQUENCY: INTERMITTENT

## 2024-11-08 LAB
ALBUMIN SERPL-MCNC: 3.4 G/DL (ref 3.4–5)
ANION GAP SERPL CALCULATED.3IONS-SCNC: 14 MMOL/L (ref 3–16)
BASOPHILS # BLD: 0.1 K/UL (ref 0–0.2)
BASOPHILS NFR BLD: 0.6 %
BUN SERPL-MCNC: 20 MG/DL (ref 7–20)
CALCIUM SERPL-MCNC: 8.1 MG/DL (ref 8.3–10.6)
CHLORIDE SERPL-SCNC: 97 MMOL/L (ref 99–110)
CO2 SERPL-SCNC: 20 MMOL/L (ref 21–32)
CREAT SERPL-MCNC: 7.2 MG/DL (ref 0.6–1.2)
DEPRECATED RDW RBC AUTO: 21.5 % (ref 12.4–15.4)
EOSINOPHIL # BLD: 1.6 K/UL (ref 0–0.6)
EOSINOPHIL NFR BLD: 12.8 %
GFR SERPLBLD CREATININE-BSD FMLA CKD-EPI: 6 ML/MIN/{1.73_M2}
GLUCOSE BLD-MCNC: 117 MG/DL (ref 70–99)
GLUCOSE BLD-MCNC: 74 MG/DL (ref 70–99)
GLUCOSE BLD-MCNC: 82 MG/DL (ref 70–99)
GLUCOSE BLD-MCNC: 82 MG/DL (ref 70–99)
GLUCOSE SERPL-MCNC: 72 MG/DL (ref 70–99)
HCT VFR BLD AUTO: 32.8 % (ref 36–48)
HGB BLD-MCNC: 10.8 G/DL (ref 12–16)
LYMPHOCYTES # BLD: 1.2 K/UL (ref 1–5.1)
LYMPHOCYTES NFR BLD: 9.5 %
MAGNESIUM SERPL-MCNC: 2.2 MG/DL (ref 1.8–2.4)
MCH RBC QN AUTO: 30 PG (ref 26–34)
MCHC RBC AUTO-ENTMCNC: 32.9 G/DL (ref 31–36)
MCV RBC AUTO: 91.2 FL (ref 80–100)
MONOCYTES # BLD: 0.9 K/UL (ref 0–1.3)
MONOCYTES NFR BLD: 7.5 %
NEUTROPHILS # BLD: 8.7 K/UL (ref 1.7–7.7)
NEUTROPHILS NFR BLD: 69.6 %
PERFORMED ON: ABNORMAL
PERFORMED ON: NORMAL
PHOSPHATE SERPL-MCNC: 4.9 MG/DL (ref 2.5–4.9)
PLATELET # BLD AUTO: 190 K/UL (ref 135–450)
PMV BLD AUTO: 7.3 FL (ref 5–10.5)
POTASSIUM SERPL-SCNC: 4.1 MMOL/L (ref 3.5–5.1)
RBC # BLD AUTO: 3.6 M/UL (ref 4–5.2)
SODIUM SERPL-SCNC: 131 MMOL/L (ref 136–145)
WBC # BLD AUTO: 12.5 K/UL (ref 4–11)

## 2024-11-08 PROCEDURE — 2580000003 HC RX 258: Performed by: INTERNAL MEDICINE

## 2024-11-08 PROCEDURE — 6360000002 HC RX W HCPCS: Performed by: INTERNAL MEDICINE

## 2024-11-08 PROCEDURE — 80069 RENAL FUNCTION PANEL: CPT

## 2024-11-08 PROCEDURE — 6370000000 HC RX 637 (ALT 250 FOR IP): Performed by: INTERNAL MEDICINE

## 2024-11-08 PROCEDURE — 36415 COLL VENOUS BLD VENIPUNCTURE: CPT

## 2024-11-08 PROCEDURE — 85025 COMPLETE CBC W/AUTO DIFF WBC: CPT

## 2024-11-08 PROCEDURE — 83735 ASSAY OF MAGNESIUM: CPT

## 2024-11-08 PROCEDURE — 99231 SBSQ HOSP IP/OBS SF/LOW 25: CPT | Performed by: SURGERY

## 2024-11-08 PROCEDURE — 2060000000 HC ICU INTERMEDIATE R&B

## 2024-11-08 RX ORDER — OXYCODONE AND ACETAMINOPHEN 5; 325 MG/1; MG/1
1 TABLET ORAL EVERY 4 HOURS PRN
Status: DISCONTINUED | OUTPATIENT
Start: 2024-11-08 | End: 2024-11-11 | Stop reason: HOSPADM

## 2024-11-08 RX ADMIN — SODIUM CHLORIDE, PRESERVATIVE FREE 40 MG: 5 INJECTION INTRAVENOUS at 21:13

## 2024-11-08 RX ADMIN — SEVELAMER CARBONATE 800 MG: 800 TABLET, FILM COATED ORAL at 12:08

## 2024-11-08 RX ADMIN — CETIRIZINE HYDROCHLORIDE 10 MG: 10 TABLET, FILM COATED ORAL at 09:14

## 2024-11-08 RX ADMIN — SODIUM CHLORIDE, PRESERVATIVE FREE 10 ML: 5 INJECTION INTRAVENOUS at 09:15

## 2024-11-08 RX ADMIN — CALCIUM CARBONATE 1000 MG: 500 TABLET, CHEWABLE ORAL at 21:12

## 2024-11-08 RX ADMIN — SODIUM CHLORIDE, PRESERVATIVE FREE 40 MG: 5 INJECTION INTRAVENOUS at 09:16

## 2024-11-08 RX ADMIN — NORTRIPTYLINE HYDROCHLORIDE 25 MG: 25 CAPSULE ORAL at 21:11

## 2024-11-08 RX ADMIN — CALCIUM CARBONATE 1000 MG: 500 TABLET, CHEWABLE ORAL at 09:14

## 2024-11-08 RX ADMIN — METHOCARBAMOL TABLETS 500 MG: 500 TABLET, COATED ORAL at 03:38

## 2024-11-08 RX ADMIN — METHOCARBAMOL TABLETS 500 MG: 500 TABLET, COATED ORAL at 21:12

## 2024-11-08 RX ADMIN — SEVELAMER CARBONATE 800 MG: 800 TABLET, FILM COATED ORAL at 09:14

## 2024-11-08 RX ADMIN — AMIODARONE HYDROCHLORIDE 200 MG: 200 TABLET ORAL at 09:14

## 2024-11-08 RX ADMIN — MEROPENEM 500 MG: 500 INJECTION, POWDER, FOR SOLUTION INTRAVENOUS at 15:28

## 2024-11-08 RX ADMIN — HYDROMORPHONE HYDROCHLORIDE 0.5 MG: 1 INJECTION, SOLUTION INTRAMUSCULAR; INTRAVENOUS; SUBCUTANEOUS at 03:38

## 2024-11-08 RX ADMIN — DIVALPROEX SODIUM 125 MG: 125 TABLET, DELAYED RELEASE ORAL at 21:28

## 2024-11-08 RX ADMIN — DILTIAZEM HYDROCHLORIDE 300 MG: 300 CAPSULE, COATED, EXTENDED RELEASE ORAL at 21:12

## 2024-11-08 RX ADMIN — ATORVASTATIN CALCIUM 40 MG: 40 TABLET, FILM COATED ORAL at 21:12

## 2024-11-08 RX ADMIN — DIPHENHYDRAMINE HYDROCHLORIDE 25 MG: 25 TABLET ORAL at 21:12

## 2024-11-08 RX ADMIN — CALCITRIOL CAPSULES 0.25 MCG 2 MCG: 0.25 CAPSULE ORAL at 09:14

## 2024-11-08 RX ADMIN — HYDROMORPHONE HYDROCHLORIDE 0.5 MG: 1 INJECTION, SOLUTION INTRAMUSCULAR; INTRAVENOUS; SUBCUTANEOUS at 15:44

## 2024-11-08 RX ADMIN — SODIUM CHLORIDE, PRESERVATIVE FREE 10 ML: 5 INJECTION INTRAVENOUS at 21:13

## 2024-11-08 RX ADMIN — AMIODARONE HYDROCHLORIDE 200 MG: 200 TABLET ORAL at 21:12

## 2024-11-08 RX ADMIN — CALCITRIOL CAPSULES 0.25 MCG 2 MCG: 0.25 CAPSULE ORAL at 21:11

## 2024-11-08 RX ADMIN — SEVELAMER CARBONATE 800 MG: 800 TABLET, FILM COATED ORAL at 17:20

## 2024-11-08 RX ADMIN — Medication 6 MG: at 21:11

## 2024-11-08 RX ADMIN — HYDROMORPHONE HYDROCHLORIDE 0.5 MG: 1 INJECTION, SOLUTION INTRAMUSCULAR; INTRAVENOUS; SUBCUTANEOUS at 10:01

## 2024-11-08 RX ADMIN — METOPROLOL SUCCINATE 50 MG: 50 TABLET, EXTENDED RELEASE ORAL at 21:12

## 2024-11-08 RX ADMIN — CALCIUM CARBONATE 1000 MG: 500 TABLET, CHEWABLE ORAL at 15:26

## 2024-11-08 ASSESSMENT — PAIN SCALES - GENERAL
PAINLEVEL_OUTOF10: 0
PAINLEVEL_OUTOF10: 0
PAINLEVEL_OUTOF10: 6
PAINLEVEL_OUTOF10: 8
PAINLEVEL_OUTOF10: 0
PAINLEVEL_OUTOF10: 3
PAINLEVEL_OUTOF10: 9
PAINLEVEL_OUTOF10: 3
PAINLEVEL_OUTOF10: 8
PAINLEVEL_OUTOF10: 3

## 2024-11-08 ASSESSMENT — PAIN DESCRIPTION - DESCRIPTORS
DESCRIPTORS: ACHING;CRAMPING
DESCRIPTORS: ACHING
DESCRIPTORS: ACHING
DESCRIPTORS: ACHING;DISCOMFORT

## 2024-11-08 ASSESSMENT — PAIN DESCRIPTION - ONSET
ONSET: ON-GOING

## 2024-11-08 ASSESSMENT — PAIN DESCRIPTION - LOCATION
LOCATION: GENERALIZED
LOCATION: ABDOMEN
LOCATION: HEAD

## 2024-11-08 ASSESSMENT — PAIN DESCRIPTION - PAIN TYPE
TYPE: ACUTE PAIN

## 2024-11-08 ASSESSMENT — PAIN DESCRIPTION - ORIENTATION
ORIENTATION: RIGHT;LEFT;ANTERIOR;POSTERIOR
ORIENTATION: RIGHT;LEFT

## 2024-11-08 ASSESSMENT — PAIN - FUNCTIONAL ASSESSMENT
PAIN_FUNCTIONAL_ASSESSMENT: PREVENTS OR INTERFERES SOME ACTIVE ACTIVITIES AND ADLS
PAIN_FUNCTIONAL_ASSESSMENT: ACTIVITIES ARE NOT PREVENTED
PAIN_FUNCTIONAL_ASSESSMENT: ACTIVITIES ARE NOT PREVENTED
PAIN_FUNCTIONAL_ASSESSMENT: PREVENTS OR INTERFERES WITH MANY ACTIVE NOT PASSIVE ACTIVITIES

## 2024-11-08 ASSESSMENT — PAIN SCALES - WONG BAKER: WONGBAKER_NUMERICALRESPONSE: NO HURT

## 2024-11-08 ASSESSMENT — PAIN DESCRIPTION - FREQUENCY
FREQUENCY: CONTINUOUS
FREQUENCY: INTERMITTENT
FREQUENCY: INTERMITTENT

## 2024-11-08 NOTE — CARE COORDINATION
1:43 PM  Patient LTC at [place]. Plan to return at dc, no cert needed to return.   Patient continues on IV atbx. Uncontrolled pain.   CM following for dc planning and support.     Electronically signed by Jared Robles RN, CM on 11/8/2024 at 2:22 PM.  Phone: [phone]  Fax: [phone]

## 2024-11-09 LAB
ALBUMIN SERPL-MCNC: 3.5 G/DL (ref 3.4–5)
ANION GAP SERPL CALCULATED.3IONS-SCNC: 12 MMOL/L (ref 3–16)
BASOPHILS # BLD: 0.1 K/UL (ref 0–0.2)
BASOPHILS NFR BLD: 0.5 %
BUN SERPL-MCNC: 26 MG/DL (ref 7–20)
CALCIUM SERPL-MCNC: 8.2 MG/DL (ref 8.3–10.6)
CHLORIDE SERPL-SCNC: 97 MMOL/L (ref 99–110)
CO2 SERPL-SCNC: 23 MMOL/L (ref 21–32)
CREAT SERPL-MCNC: 9.1 MG/DL (ref 0.6–1.2)
DEPRECATED RDW RBC AUTO: 21.2 % (ref 12.4–15.4)
EOSINOPHIL # BLD: 1.5 K/UL (ref 0–0.6)
EOSINOPHIL NFR BLD: 12.6 %
GFR SERPLBLD CREATININE-BSD FMLA CKD-EPI: 4 ML/MIN/{1.73_M2}
GLUCOSE BLD-MCNC: 101 MG/DL (ref 70–99)
GLUCOSE BLD-MCNC: 115 MG/DL (ref 70–99)
GLUCOSE BLD-MCNC: 94 MG/DL (ref 70–99)
GLUCOSE SERPL-MCNC: 90 MG/DL (ref 70–99)
HCT VFR BLD AUTO: 31.3 % (ref 36–48)
HGB BLD-MCNC: 10.2 G/DL (ref 12–16)
LYMPHOCYTES # BLD: 1.5 K/UL (ref 1–5.1)
LYMPHOCYTES NFR BLD: 12.3 %
MAGNESIUM SERPL-MCNC: 2.3 MG/DL (ref 1.8–2.4)
MCH RBC QN AUTO: 29.6 PG (ref 26–34)
MCHC RBC AUTO-ENTMCNC: 32.6 G/DL (ref 31–36)
MCV RBC AUTO: 90.8 FL (ref 80–100)
MONOCYTES # BLD: 0.9 K/UL (ref 0–1.3)
MONOCYTES NFR BLD: 7.2 %
NEUTROPHILS # BLD: 8.2 K/UL (ref 1.7–7.7)
NEUTROPHILS NFR BLD: 67.4 %
PERFORMED ON: ABNORMAL
PERFORMED ON: ABNORMAL
PERFORMED ON: NORMAL
PHOSPHATE SERPL-MCNC: 4.7 MG/DL (ref 2.5–4.9)
PLATELET # BLD AUTO: 222 K/UL (ref 135–450)
PMV BLD AUTO: 6.9 FL (ref 5–10.5)
POTASSIUM SERPL-SCNC: 3.9 MMOL/L (ref 3.5–5.1)
RBC # BLD AUTO: 3.44 M/UL (ref 4–5.2)
SODIUM SERPL-SCNC: 132 MMOL/L (ref 136–145)
WBC # BLD AUTO: 12.1 K/UL (ref 4–11)

## 2024-11-09 PROCEDURE — 6370000000 HC RX 637 (ALT 250 FOR IP): Performed by: INTERNAL MEDICINE

## 2024-11-09 PROCEDURE — 83735 ASSAY OF MAGNESIUM: CPT

## 2024-11-09 PROCEDURE — 2580000003 HC RX 258: Performed by: INTERNAL MEDICINE

## 2024-11-09 PROCEDURE — 2060000000 HC ICU INTERMEDIATE R&B

## 2024-11-09 PROCEDURE — 6360000002 HC RX W HCPCS: Performed by: INTERNAL MEDICINE

## 2024-11-09 PROCEDURE — 80069 RENAL FUNCTION PANEL: CPT

## 2024-11-09 PROCEDURE — 90935 HEMODIALYSIS ONE EVALUATION: CPT

## 2024-11-09 PROCEDURE — 99232 SBSQ HOSP IP/OBS MODERATE 35: CPT | Performed by: SURGERY

## 2024-11-09 PROCEDURE — 85025 COMPLETE CBC W/AUTO DIFF WBC: CPT

## 2024-11-09 PROCEDURE — 5A1D70Z PERFORMANCE OF URINARY FILTRATION, INTERMITTENT, LESS THAN 6 HOURS PER DAY: ICD-10-PCS | Performed by: INTERNAL MEDICINE

## 2024-11-09 RX ADMIN — DIVALPROEX SODIUM 125 MG: 125 TABLET, DELAYED RELEASE ORAL at 21:40

## 2024-11-09 RX ADMIN — SODIUM CHLORIDE, PRESERVATIVE FREE 10 ML: 5 INJECTION INTRAVENOUS at 11:29

## 2024-11-09 RX ADMIN — SODIUM CHLORIDE, PRESERVATIVE FREE 40 MG: 5 INJECTION INTRAVENOUS at 11:28

## 2024-11-09 RX ADMIN — OXYCODONE HYDROCHLORIDE AND ACETAMINOPHEN 1 TABLET: 5; 325 TABLET ORAL at 11:27

## 2024-11-09 RX ADMIN — DIPHENHYDRAMINE HYDROCHLORIDE 25 MG: 25 TABLET ORAL at 04:49

## 2024-11-09 RX ADMIN — DIPHENHYDRAMINE HYDROCHLORIDE 25 MG: 25 TABLET ORAL at 16:31

## 2024-11-09 RX ADMIN — CALCITRIOL CAPSULES 0.25 MCG 2 MCG: 0.25 CAPSULE ORAL at 11:24

## 2024-11-09 RX ADMIN — SODIUM CHLORIDE, PRESERVATIVE FREE 40 MG: 5 INJECTION INTRAVENOUS at 21:23

## 2024-11-09 RX ADMIN — CETIRIZINE HYDROCHLORIDE 10 MG: 10 TABLET, FILM COATED ORAL at 11:26

## 2024-11-09 RX ADMIN — AMIODARONE HYDROCHLORIDE 200 MG: 200 TABLET ORAL at 11:26

## 2024-11-09 RX ADMIN — ATORVASTATIN CALCIUM 40 MG: 40 TABLET, FILM COATED ORAL at 21:14

## 2024-11-09 RX ADMIN — OXYCODONE HYDROCHLORIDE AND ACETAMINOPHEN 1 TABLET: 5; 325 TABLET ORAL at 16:31

## 2024-11-09 RX ADMIN — CALCIUM CARBONATE 1000 MG: 500 TABLET, CHEWABLE ORAL at 21:14

## 2024-11-09 RX ADMIN — METOPROLOL SUCCINATE 50 MG: 50 TABLET, EXTENDED RELEASE ORAL at 21:14

## 2024-11-09 RX ADMIN — TORSEMIDE 40 MG: 20 TABLET ORAL at 11:26

## 2024-11-09 RX ADMIN — NORTRIPTYLINE HYDROCHLORIDE 25 MG: 25 CAPSULE ORAL at 21:22

## 2024-11-09 RX ADMIN — SEVELAMER CARBONATE 800 MG: 800 TABLET, FILM COATED ORAL at 11:25

## 2024-11-09 RX ADMIN — MEROPENEM 500 MG: 500 INJECTION, POWDER, FOR SOLUTION INTRAVENOUS at 16:28

## 2024-11-09 RX ADMIN — CALCITRIOL CAPSULES 0.25 MCG 2 MCG: 0.25 CAPSULE ORAL at 21:23

## 2024-11-09 RX ADMIN — DILTIAZEM HYDROCHLORIDE 300 MG: 300 CAPSULE, COATED, EXTENDED RELEASE ORAL at 21:13

## 2024-11-09 RX ADMIN — SODIUM CHLORIDE, PRESERVATIVE FREE 10 ML: 5 INJECTION INTRAVENOUS at 21:23

## 2024-11-09 RX ADMIN — CALCIUM CARBONATE 1000 MG: 500 TABLET, CHEWABLE ORAL at 11:25

## 2024-11-09 RX ADMIN — HYDROMORPHONE HYDROCHLORIDE 0.5 MG: 1 INJECTION, SOLUTION INTRAMUSCULAR; INTRAVENOUS; SUBCUTANEOUS at 01:10

## 2024-11-09 RX ADMIN — DIPHENHYDRAMINE HYDROCHLORIDE 25 MG: 25 TABLET ORAL at 11:27

## 2024-11-09 RX ADMIN — SEVELAMER CARBONATE 800 MG: 800 TABLET, FILM COATED ORAL at 18:19

## 2024-11-09 RX ADMIN — DIPHENHYDRAMINE HYDROCHLORIDE 25 MG: 25 TABLET ORAL at 21:22

## 2024-11-09 RX ADMIN — AMIODARONE HYDROCHLORIDE 200 MG: 200 TABLET ORAL at 21:14

## 2024-11-09 RX ADMIN — OXYCODONE HYDROCHLORIDE AND ACETAMINOPHEN 1 TABLET: 5; 325 TABLET ORAL at 04:49

## 2024-11-09 RX ADMIN — POLYETHYLENE GLYCOL 3350 17 G: 17 POWDER, FOR SOLUTION ORAL at 11:26

## 2024-11-09 ASSESSMENT — PAIN DESCRIPTION - LOCATION
LOCATION: ABDOMEN
LOCATION: OTHER (COMMENT)
LOCATION: ABDOMEN
LOCATION: OTHER (COMMENT)

## 2024-11-09 ASSESSMENT — PAIN DESCRIPTION - ONSET
ONSET: ON-GOING

## 2024-11-09 ASSESSMENT — PAIN DESCRIPTION - FREQUENCY
FREQUENCY: CONTINUOUS
FREQUENCY: INTERMITTENT

## 2024-11-09 ASSESSMENT — PAIN DESCRIPTION - ORIENTATION
ORIENTATION: OTHER (COMMENT)
ORIENTATION: OTHER (COMMENT)
ORIENTATION: ANTERIOR
ORIENTATION: ANTERIOR

## 2024-11-09 ASSESSMENT — PAIN DESCRIPTION - PAIN TYPE
TYPE: ACUTE PAIN

## 2024-11-09 ASSESSMENT — PAIN - FUNCTIONAL ASSESSMENT
PAIN_FUNCTIONAL_ASSESSMENT: PREVENTS OR INTERFERES SOME ACTIVE ACTIVITIES AND ADLS
PAIN_FUNCTIONAL_ASSESSMENT: PREVENTS OR INTERFERES WITH ALL ACTIVE AND SOME PASSIVE ACTIVITIES

## 2024-11-09 ASSESSMENT — PAIN SCALES - GENERAL
PAINLEVEL_OUTOF10: 8
PAINLEVEL_OUTOF10: 8
PAINLEVEL_OUTOF10: 4
PAINLEVEL_OUTOF10: 4
PAINLEVEL_OUTOF10: 5
PAINLEVEL_OUTOF10: 5
PAINLEVEL_OUTOF10: 0
PAINLEVEL_OUTOF10: 0

## 2024-11-09 ASSESSMENT — PAIN DESCRIPTION - DESCRIPTORS
DESCRIPTORS: ACHING
DESCRIPTORS: ACHING;CRAMPING
DESCRIPTORS: ACHING
DESCRIPTORS: ACHING;CRAMPING;DISCOMFORT

## 2024-11-10 PROBLEM — I50.32 CHRONIC DIASTOLIC CONGESTIVE HEART FAILURE (HCC): Status: ACTIVE | Noted: 2024-11-10

## 2024-11-10 LAB
ALBUMIN SERPL-MCNC: 3.6 G/DL (ref 3.4–5)
ANION GAP SERPL CALCULATED.3IONS-SCNC: 13 MMOL/L (ref 3–16)
BASOPHILS # BLD: 0.1 K/UL (ref 0–0.2)
BASOPHILS NFR BLD: 0.7 %
BUN SERPL-MCNC: 16 MG/DL (ref 7–20)
CALCIUM SERPL-MCNC: 8.5 MG/DL (ref 8.3–10.6)
CHLORIDE SERPL-SCNC: 97 MMOL/L (ref 99–110)
CO2 SERPL-SCNC: 22 MMOL/L (ref 21–32)
CREAT SERPL-MCNC: 6.6 MG/DL (ref 0.6–1.2)
DEPRECATED RDW RBC AUTO: 21.3 % (ref 12.4–15.4)
EOSINOPHIL # BLD: 1.5 K/UL (ref 0–0.6)
EOSINOPHIL NFR BLD: 12.5 %
GFR SERPLBLD CREATININE-BSD FMLA CKD-EPI: 6 ML/MIN/{1.73_M2}
GLUCOSE BLD-MCNC: 101 MG/DL (ref 70–99)
GLUCOSE BLD-MCNC: 104 MG/DL (ref 70–99)
GLUCOSE BLD-MCNC: 113 MG/DL (ref 70–99)
GLUCOSE SERPL-MCNC: 95 MG/DL (ref 70–99)
HCT VFR BLD AUTO: 34.8 % (ref 36–48)
HGB BLD-MCNC: 11.2 G/DL (ref 12–16)
LYMPHOCYTES # BLD: 1.5 K/UL (ref 1–5.1)
LYMPHOCYTES NFR BLD: 12.6 %
MAGNESIUM SERPL-MCNC: 2.15 MG/DL (ref 1.8–2.4)
MCH RBC QN AUTO: 29.3 PG (ref 26–34)
MCHC RBC AUTO-ENTMCNC: 32.1 G/DL (ref 31–36)
MCV RBC AUTO: 91.2 FL (ref 80–100)
MONOCYTES # BLD: 0.9 K/UL (ref 0–1.3)
MONOCYTES NFR BLD: 7.4 %
NEUTROPHILS # BLD: 8 K/UL (ref 1.7–7.7)
NEUTROPHILS NFR BLD: 66.8 %
PERFORMED ON: ABNORMAL
PHOSPHATE SERPL-MCNC: 4 MG/DL (ref 2.5–4.9)
PLATELET # BLD AUTO: 237 K/UL (ref 135–450)
PMV BLD AUTO: 6.7 FL (ref 5–10.5)
POTASSIUM SERPL-SCNC: 4 MMOL/L (ref 3.5–5.1)
RBC # BLD AUTO: 3.82 M/UL (ref 4–5.2)
SODIUM SERPL-SCNC: 132 MMOL/L (ref 136–145)
WBC # BLD AUTO: 11.9 K/UL (ref 4–11)

## 2024-11-10 PROCEDURE — 2580000003 HC RX 258: Performed by: INTERNAL MEDICINE

## 2024-11-10 PROCEDURE — 80069 RENAL FUNCTION PANEL: CPT

## 2024-11-10 PROCEDURE — 85025 COMPLETE CBC W/AUTO DIFF WBC: CPT

## 2024-11-10 PROCEDURE — 83735 ASSAY OF MAGNESIUM: CPT

## 2024-11-10 PROCEDURE — 99231 SBSQ HOSP IP/OBS SF/LOW 25: CPT | Performed by: SURGERY

## 2024-11-10 PROCEDURE — 6370000000 HC RX 637 (ALT 250 FOR IP): Performed by: INTERNAL MEDICINE

## 2024-11-10 PROCEDURE — 2060000000 HC ICU INTERMEDIATE R&B

## 2024-11-10 PROCEDURE — 6360000002 HC RX W HCPCS: Performed by: INTERNAL MEDICINE

## 2024-11-10 PROCEDURE — 36415 COLL VENOUS BLD VENIPUNCTURE: CPT

## 2024-11-10 RX ADMIN — AMIODARONE HYDROCHLORIDE 200 MG: 200 TABLET ORAL at 20:43

## 2024-11-10 RX ADMIN — SODIUM CHLORIDE, PRESERVATIVE FREE 40 MG: 5 INJECTION INTRAVENOUS at 20:43

## 2024-11-10 RX ADMIN — CALCIUM CARBONATE 1000 MG: 500 TABLET, CHEWABLE ORAL at 09:32

## 2024-11-10 RX ADMIN — DIPHENHYDRAMINE HYDROCHLORIDE 25 MG: 25 TABLET ORAL at 11:49

## 2024-11-10 RX ADMIN — SEVELAMER CARBONATE 800 MG: 800 TABLET, FILM COATED ORAL at 16:24

## 2024-11-10 RX ADMIN — OXYCODONE HYDROCHLORIDE AND ACETAMINOPHEN 1 TABLET: 5; 325 TABLET ORAL at 11:48

## 2024-11-10 RX ADMIN — NORTRIPTYLINE HYDROCHLORIDE 25 MG: 25 CAPSULE ORAL at 20:43

## 2024-11-10 RX ADMIN — METHOCARBAMOL TABLETS 500 MG: 500 TABLET, COATED ORAL at 13:47

## 2024-11-10 RX ADMIN — DIVALPROEX SODIUM 125 MG: 125 TABLET, DELAYED RELEASE ORAL at 20:44

## 2024-11-10 RX ADMIN — POLYETHYLENE GLYCOL 3350 17 G: 17 POWDER, FOR SOLUTION ORAL at 09:31

## 2024-11-10 RX ADMIN — CETIRIZINE HYDROCHLORIDE 10 MG: 10 TABLET, FILM COATED ORAL at 09:31

## 2024-11-10 RX ADMIN — CALCITRIOL CAPSULES 0.25 MCG 2 MCG: 0.25 CAPSULE ORAL at 09:41

## 2024-11-10 RX ADMIN — SODIUM CHLORIDE, PRESERVATIVE FREE 40 MG: 5 INJECTION INTRAVENOUS at 09:34

## 2024-11-10 RX ADMIN — CALCIUM CARBONATE 1000 MG: 500 TABLET, CHEWABLE ORAL at 20:42

## 2024-11-10 RX ADMIN — METOPROLOL SUCCINATE 50 MG: 50 TABLET, EXTENDED RELEASE ORAL at 20:42

## 2024-11-10 RX ADMIN — ATORVASTATIN CALCIUM 40 MG: 40 TABLET, FILM COATED ORAL at 20:43

## 2024-11-10 RX ADMIN — DILTIAZEM HYDROCHLORIDE 300 MG: 300 CAPSULE, COATED, EXTENDED RELEASE ORAL at 20:43

## 2024-11-10 RX ADMIN — AMIODARONE HYDROCHLORIDE 200 MG: 200 TABLET ORAL at 09:31

## 2024-11-10 RX ADMIN — SEVELAMER CARBONATE 800 MG: 800 TABLET, FILM COATED ORAL at 09:32

## 2024-11-10 RX ADMIN — MEROPENEM 500 MG: 500 INJECTION, POWDER, FOR SOLUTION INTRAVENOUS at 16:29

## 2024-11-10 RX ADMIN — CALCITRIOL CAPSULES 0.25 MCG 2 MCG: 0.25 CAPSULE ORAL at 20:42

## 2024-11-10 RX ADMIN — SODIUM CHLORIDE, PRESERVATIVE FREE 10 ML: 5 INJECTION INTRAVENOUS at 09:36

## 2024-11-10 RX ADMIN — HYDROMORPHONE HYDROCHLORIDE 0.5 MG: 1 INJECTION, SOLUTION INTRAMUSCULAR; INTRAVENOUS; SUBCUTANEOUS at 09:31

## 2024-11-10 RX ADMIN — SEVELAMER CARBONATE 800 MG: 800 TABLET, FILM COATED ORAL at 11:44

## 2024-11-10 RX ADMIN — SODIUM CHLORIDE, PRESERVATIVE FREE 10 ML: 5 INJECTION INTRAVENOUS at 20:44

## 2024-11-10 RX ADMIN — TORSEMIDE 40 MG: 20 TABLET ORAL at 09:31

## 2024-11-10 RX ADMIN — OXYCODONE HYDROCHLORIDE AND ACETAMINOPHEN 1 TABLET: 5; 325 TABLET ORAL at 16:24

## 2024-11-10 ASSESSMENT — PAIN DESCRIPTION - ORIENTATION
ORIENTATION: RIGHT;LEFT

## 2024-11-10 ASSESSMENT — PAIN DESCRIPTION - LOCATION
LOCATION: BACK;LEG
LOCATION: BACK;LEG
LOCATION: LEG;GENERALIZED
LOCATION: BACK;LEG
LOCATION: BACK;LEG
LOCATION: GENERALIZED
LOCATION: BACK;LEG

## 2024-11-10 ASSESSMENT — PAIN DESCRIPTION - ONSET
ONSET: ON-GOING

## 2024-11-10 ASSESSMENT — PAIN DESCRIPTION - DESCRIPTORS
DESCRIPTORS: SHARP
DESCRIPTORS: ACHING;SHARP

## 2024-11-10 ASSESSMENT — PAIN DESCRIPTION - FREQUENCY
FREQUENCY: CONTINUOUS

## 2024-11-10 ASSESSMENT — PAIN SCALES - GENERAL
PAINLEVEL_OUTOF10: 9
PAINLEVEL_OUTOF10: 9
PAINLEVEL_OUTOF10: 5
PAINLEVEL_OUTOF10: 7
PAINLEVEL_OUTOF10: 3
PAINLEVEL_OUTOF10: 2
PAINLEVEL_OUTOF10: 7
PAINLEVEL_OUTOF10: 8
PAINLEVEL_OUTOF10: 8

## 2024-11-10 ASSESSMENT — PAIN DESCRIPTION - PAIN TYPE
TYPE: ACUTE PAIN

## 2024-11-10 ASSESSMENT — PAIN - FUNCTIONAL ASSESSMENT
PAIN_FUNCTIONAL_ASSESSMENT: PREVENTS OR INTERFERES SOME ACTIVE ACTIVITIES AND ADLS

## 2024-11-10 NOTE — PLAN OF CARE
Problem: Chronic Conditions and Co-morbidities  Goal: Patient's chronic conditions and co-morbidity symptoms are monitored and maintained or improved  11/10/2024 0416 by Sofiya Lyn RN  Outcome: Progressing  11/9/2024 1736 by Marialuisa Newman RN  Outcome: Progressing     Problem: Discharge Planning  Goal: Discharge to home or other facility with appropriate resources  11/10/2024 0416 by Sofiya Lyn RN  Outcome: Progressing  11/9/2024 1736 by Marialuisa Newman RN  Outcome: Progressing     Problem: Skin/Tissue Integrity  Goal: Absence of new skin breakdown  Description: 1.  Monitor for areas of redness and/or skin breakdown  2.  Assess vascular access sites hourly  3.  Every 4-6 hours minimum:  Change oxygen saturation probe site  4.  Every 4-6 hours:  If on nasal continuous positive airway pressure, respiratory therapy assess nares and determine need for appliance change or resting period.  11/10/2024 0416 by Sofiya Lyn RN  Outcome: Progressing  11/9/2024 1736 by Marialuisa Newman RN  Outcome: Progressing     Problem: Safety - Adult  Goal: Free from fall injury  11/10/2024 0416 by Sofiya Lyn RN  Outcome: Progressing  11/9/2024 1736 by Marialuisa Newman RN  Outcome: Progressing     Problem: Pain  Goal: Verbalizes/displays adequate comfort level or baseline comfort level  11/10/2024 0416 by Sofiya Lyn RN  Outcome: Progressing  11/9/2024 1736 by Marialuisa Newman RN  Outcome: Progressing     Problem: Infection - Adult  Goal: Absence of infection at discharge  11/9/2024 1736 by Marialuisa Newman RN  Outcome: Progressing  Goal: Absence of infection during hospitalization  11/9/2024 1736 by Marialuisa Newman RN  Outcome: Progressing     
  Problem: Chronic Conditions and Co-morbidities  Goal: Patient's chronic conditions and co-morbidity symptoms are monitored and maintained or improved  11/10/2024 1601 by Julio Olmstead, RN  Outcome: Progressing     Problem: Discharge Planning  Goal: Discharge to home or other facility with appropriate resources  11/10/2024 1601 by Julio Olmstead, RN  Outcome: Progressing     Problem: Skin/Tissue Integrity  Goal: Absence of new skin breakdown  Description: 1.  Monitor for areas of redness and/or skin breakdown  2.  Assess vascular access sites hourly  3.  Every 4-6 hours minimum:  Change oxygen saturation probe site  4.  Every 4-6 hours:  If on nasal continuous positive airway pressure, respiratory therapy assess nares and determine need for appliance change or resting period.  11/10/2024 1601 by Julio Olmstead, RN  Outcome: Progressing     Problem: Safety - Adult  Goal: Free from fall injury  11/10/2024 0416 by Sofiya Lyn, RN  Outcome: Progressing     Problem: Pain  Goal: Verbalizes/displays adequate comfort level or baseline comfort level  11/10/2024 0416 by Sofiya Lyn, RN  Outcome: Progressing     
  Problem: Chronic Conditions and Co-morbidities  Goal: Patient's chronic conditions and co-morbidity symptoms are monitored and maintained or improved  11/5/2024 0449 by Lencho Cerda, RN  Outcome: Progressing  Flowsheets (Taken 11/5/2024 0446)  Care Plan - Patient's Chronic Conditions and Co-Morbidity Symptoms are Monitored and Maintained or Improved: Monitor and assess patient's chronic conditions and comorbid symptoms for stability, deterioration, or improvement  Note: Monitoring Blood Sugars AC/HS and PRN.  Last Blood Sugar 82 at 0330.  Remains NPO.      Problem: Discharge Planning  Goal: Discharge to home or other facility with appropriate resources  11/5/2024 0449 by Lencho Cerda, RN  Outcome: Progressing  Flowsheets (Taken 11/5/2024 0447)  Discharge to home or other facility with appropriate resources: Identify barriers to discharge with patient and caregiver  Note: From [facility].  Plans to return there on D/C.  Case Management following.       Problem: Skin/Tissue Integrity  Goal: Absence of new skin breakdown  Description: 1.  Monitor for areas of redness and/or skin breakdown  2.  Assess vascular access sites hourly  3.  Every 4-6 hours minimum:  Change oxygen saturation probe site  4.  Every 4-6 hours:  If on nasal continuous positive airway pressure, respiratory therapy assess nares and determine need for appliance change or resting period.  11/5/2024 0449 by Lencho Cerda, RN  Outcome: Progressing  Note: Reginald scale score 15.  Immobile and non-ambulatory at baseline. Repositioned at beginning of shift per request, but has declined repositioning since then.  Heels elevated.  Low airloss mattress ordered, declines to be placed on mattress at this time.  Encouraged repositioning to maintain skin integrity.      Problem: Safety - Adult  Goal: Free from fall injury  11/5/2024 0449 by Lencho Cerda, RN  Flowsheets (Taken 11/5/2024 0449)  Free From Fall Injury: Instruct 
  Problem: Chronic Conditions and Co-morbidities  Goal: Patient's chronic conditions and co-morbidity symptoms are monitored and maintained or improved  11/6/2024 0844 by Marialuisa Newman RN  Outcome: Progressing     Problem: Discharge Planning  Goal: Discharge to home or other facility with appropriate resources  11/6/2024 0844 by Marialuisa Newman RN  Outcome: Progressing     Problem: Skin/Tissue Integrity  Goal: Absence of new skin breakdown  Description: 1.  Monitor for areas of redness and/or skin breakdown  2.  Assess vascular access sites hourly  3.  Every 4-6 hours minimum:  Change oxygen saturation probe site  4.  Every 4-6 hours:  If on nasal continuous positive airway pressure, respiratory therapy assess nares and determine need for appliance change or resting period.  11/6/2024 0844 by Marialuisa Newman RN  Outcome: Progressing     Problem: Safety - Adult  Goal: Free from fall injury  11/6/2024 0844 by Marialuisa Newman RN  Outcome: Progressing    Free From Fall Injury:   Instruct family/caregiver on patient safety   Based on caregiver fall risk screen, instruct family/caregiver to ask for assistance with transferring infant if caregiver noted to have fall risk factors     Problem: Pain  Goal: Verbalizes/displays adequate comfort level or baseline comfort level  11/6/2024 0844 by Marialuisa Newman RN  Outcome: Progressing    Verbalizes/displays adequate comfort level or baseline comfort level:   Encourage patient to monitor pain and request assistance   Administer analgesics based on type and severity of pain and evaluate response   Consider cultural and social influences on pain and pain management   Assess pain using appropriate pain scale   Implement non-pharmacological measures as appropriate and evaluate response   Notify Licensed Independent Practitioner if interventions unsuccessful or patient reports new pain     Problem: Infection - Adult  Goal: Absence of infection at discharge  11/6/2024 0844 by 
  Problem: Chronic Conditions and Co-morbidities  Goal: Patient's chronic conditions and co-morbidity symptoms are monitored and maintained or improved  11/7/2024 0943 by Marialuisa Newman RN  Outcome: Progressing     Problem: Discharge Planning  Goal: Discharge to home or other facility with appropriate resources  11/7/2024 0943 by Marialuisa Newman RN  Outcome: Progressing     Problem: Skin/Tissue Integrity  Goal: Absence of new skin breakdown  Description: 1.  Monitor for areas of redness and/or skin breakdown  2.  Assess vascular access sites hourly  3.  Every 4-6 hours minimum:  Change oxygen saturation probe site  4.  Every 4-6 hours:  If on nasal continuous positive airway pressure, respiratory therapy assess nares and determine need for appliance change or resting period.  11/7/2024 0943 by Marialuisa Newman RN  Outcome: Progressing     Problem: Safety - Adult  Goal: Free from fall injury  11/7/2024 0943 by Marialuisa Newman RN  Outcome: Progressing     Problem: Pain  Goal: Verbalizes/displays adequate comfort level or baseline comfort level  11/7/2024 0943 by Marialuisa Newman RN  Outcome: Progressing     
  Problem: Chronic Conditions and Co-morbidities  Goal: Patient's chronic conditions and co-morbidity symptoms are monitored and maintained or improved  11/8/2024 1021 by Fanta Smith RN  Outcome: Progressing  Flowsheets (Taken 11/8/2024 1021)  Care Plan - Patient's Chronic Conditions and Co-Morbidity Symptoms are Monitored and Maintained or Improved:   Collaborate with multidisciplinary team to address chronic and comorbid conditions and prevent exacerbation or deterioration   Monitor and assess patient's chronic conditions and comorbid symptoms for stability, deterioration, or improvement     Problem: Discharge Planning  Goal: Discharge to home or other facility with appropriate resources  11/8/2024 1021 by Fanta Smith, RN  Outcome: Progressing  Flowsheets (Taken 11/8/2024 1021)  Discharge to home or other facility with appropriate resources:   Identify barriers to discharge with patient and caregiver   Arrange for needed discharge resources and transportation as appropriate     Problem: Skin/Tissue Integrity  Goal: Absence of new skin breakdown  Description: 1.  Monitor for areas of redness and/or skin breakdown  2.  Assess vascular access sites hourly  3.  Every 4-6 hours minimum:  Change oxygen saturation probe site  4.  Every 4-6 hours:  If on nasal continuous positive airway pressure, respiratory therapy assess nares and determine need for appliance change or resting period.  11/8/2024 1021 by Fanta Smith RN  Outcome: Progressing     Problem: Safety - Adult  Goal: Free from fall injury  11/8/2024 1021 by Fanta Smith, RN  Outcome: Progressing  Flowsheets (Taken 11/8/2024 1021)  Free From Fall Injury:   Based on caregiver fall risk screen, instruct family/caregiver to ask for assistance with transferring infant if caregiver noted to have fall risk factors   Instruct family/caregiver on patient safety     Problem: Infection - Adult  Goal: Absence of infection at discharge  11/8/2024 1021 by 
  Problem: Chronic Conditions and Co-morbidities  Goal: Patient's chronic conditions and co-morbidity symptoms are monitored and maintained or improved  11/9/2024 1736 by Marialuisa Newman RN  Outcome: Progressing     Problem: Discharge Planning  Goal: Discharge to home or other facility with appropriate resources  11/9/2024 1736 by Marialuisa Newman RN  Outcome: Progressing     Problem: Skin/Tissue Integrity  Goal: Absence of new skin breakdown  Description: 1.  Monitor for areas of redness and/or skin breakdown  2.  Assess vascular access sites hourly  3.  Every 4-6 hours minimum:  Change oxygen saturation probe site  4.  Every 4-6 hours:  If on nasal continuous positive airway pressure, respiratory therapy assess nares and determine need for appliance change or resting period.  11/9/2024 1736 by Marialuisa Newman RN  Outcome: Progressing  Pt refused q2 turns. Pt educated Pt continues to refuse.     Problem: Safety - Adult  Goal: Free from fall injury  11/9/2024 1736 by Marialuisa Newman RN  Outcome: Progressing     Problem: Pain  Goal: Verbalizes/displays adequate comfort level or baseline comfort level  11/9/2024 1736 by Marialuisa Newman RN  Outcome: Progressing   Pt  c/o pain, Uses correct 0-10 pain scale to rate pain . Prn Percocet 5-325 mg given po.    Problem: Infection - Adult  Goal: Absence of infection at discharge  11/9/2024 1736 by Marialuisa Newman RN  Outcome: Progressing     Problem: Infection - Adult  Goal: Absence of infection during hospitalization  11/9/2024 1736 by Marialuisa Newman RN  Outcome: Progressing     
  Problem: Chronic Conditions and Co-morbidities  Goal: Patient's chronic conditions and co-morbidity symptoms are monitored and maintained or improved  Outcome: Progressing     Problem: Discharge Planning  Goal: Discharge to home or other facility with appropriate resources  Outcome: Progressing     Problem: Skin/Tissue Integrity  Goal: Absence of new skin breakdown  Description: 1.  Monitor for areas of redness and/or skin breakdown  2.  Assess vascular access sites hourly  3.  Every 4-6 hours minimum:  Change oxygen saturation probe site  4.  Every 4-6 hours:  If on nasal continuous positive airway pressure, respiratory therapy assess nares and determine need for appliance change or resting period.  Outcome: Progressing     Problem: Safety - Adult  Goal: Free from fall injury  Outcome: Progressing     Problem: Pain  Goal: Verbalizes/displays adequate comfort level or baseline comfort level  Outcome: Progressing     Problem: Infection - Adult  Goal: Absence of infection at discharge  Outcome: Progressing  Goal: Absence of infection during hospitalization  Outcome: Progressing     
  Problem: Chronic Conditions and Co-morbidities  Goal: Patient's chronic conditions and co-morbidity symptoms are monitored and maintained or improved  Outcome: Progressing     Problem: Discharge Planning  Goal: Discharge to home or other facility with appropriate resources  Outcome: Progressing     Problem: Skin/Tissue Integrity  Goal: Absence of new skin breakdown  Description: 1.  Monitor for areas of redness and/or skin breakdown  2.  Assess vascular access sites hourly  3.  Every 4-6 hours minimum:  Change oxygen saturation probe site  4.  Every 4-6 hours:  If on nasal continuous positive airway pressure, respiratory therapy assess nares and determine need for appliance change or resting period.  Outcome: Progressing     Problem: Safety - Adult  Goal: Free from fall injury  Outcome: Progressing     Problem: Pain  Goal: Verbalizes/displays adequate comfort level or baseline comfort level  Outcome: Progressing     Problem: Infection - Adult  Goal: Absence of infection at discharge  Outcome: Progressing  Goal: Absence of infection during hospitalization  Outcome: Progressing     
  Problem: Chronic Conditions and Co-morbidities  Goal: Patient's chronic conditions and co-morbidity symptoms are monitored and maintained or improved  Outcome: Progressing  Flowsheets (Taken 11/3/2024 2259)  Care Plan - Patient's Chronic Conditions and Co-Morbidity Symptoms are Monitored and Maintained or Improved:   Monitor and assess patient's chronic conditions and comorbid symptoms for stability, deterioration, or improvement   Collaborate with multidisciplinary team to address chronic and comorbid conditions and prevent exacerbation or deterioration   Update acute care plan with appropriate goals if chronic or comorbid symptoms are exacerbated and prevent overall improvement and discharge     Problem: Skin/Tissue Integrity  Goal: Absence of new skin breakdown  Description: 1.  Monitor for areas of redness and/or skin breakdown  2.  Assess vascular access sites hourly  3.  Every 4-6 hours minimum:  Change oxygen saturation probe site  4.  Every 4-6 hours:  If on nasal continuous positive airway pressure, respiratory therapy assess nares and determine need for appliance change or resting period.  Outcome: Progressing     Problem: Pain  Goal: Verbalizes/displays adequate comfort level or baseline comfort level  Outcome: Progressing  Flowsheets (Taken 11/3/2024 2259)  Verbalizes/displays adequate comfort level or baseline comfort level:   Encourage patient to monitor pain and request assistance   Assess pain using appropriate pain scale   Administer analgesics based on type and severity of pain and evaluate response   Implement non-pharmacological measures as appropriate and evaluate response   Consider cultural and social influences on pain and pain management   Notify Licensed Independent Practitioner if interventions unsuccessful or patient reports new pain     
  Problem: Chronic Conditions and Co-morbidities  Goal: Patient's chronic conditions and co-morbidity symptoms are monitored and maintained or improved  Outcome: Progressing  Flowsheets (Taken 11/4/2024 1637)  Care Plan - Patient's Chronic Conditions and Co-Morbidity Symptoms are Monitored and Maintained or Improved: Monitor and assess patient's chronic conditions and comorbid symptoms for stability, deterioration, or improvement     Problem: Discharge Planning  Goal: Discharge to home or other facility with appropriate resources  Outcome: Progressing  Flowsheets (Taken 11/4/2024 1637)  Discharge to home or other facility with appropriate resources:   Identify barriers to discharge with patient and caregiver   Identify discharge learning needs (meds, wound care, etc)     Problem: Skin/Tissue Integrity  Goal: Absence of new skin breakdown  Description: 1.  Monitor for areas of redness and/or skin breakdown  2.  Assess vascular access sites hourly  3.  Every 4-6 hours minimum:  Change oxygen saturation probe site  4.  Every 4-6 hours:  If on nasal continuous positive airway pressure, respiratory therapy assess nares and determine need for appliance change or resting period.  Outcome: Progressing  Note: Q2 turns, sacral heart in placed. Heels elevated     Problem: Safety - Adult  Goal: Free from fall injury  Outcome: Progressing  Flowsheets (Taken 11/4/2024 1637)  Free From Fall Injury: Instruct family/caregiver on patient safety  Note: Patient in lowest position, bed breaks locked, bed alarm in placed, call light within reach, and encouraged to call for assistance.      Problem: Pain  Goal: Verbalizes/displays adequate comfort level or baseline comfort level  Outcome: Progressing  Flowsheets (Taken 11/4/2024 1637)  Verbalizes/displays adequate comfort level or baseline comfort level:   Encourage patient to monitor pain and request assistance   Assess pain using appropriate pain scale   Administer analgesics based on 
  Problem: Chronic Conditions and Co-morbidities  Goal: Patient's chronic conditions and co-morbidity symptoms are monitored and maintained or improved  Outcome: Progressing  Flowsheets (Taken 11/6/2024 9372)  Care Plan - Patient's Chronic Conditions and Co-Morbidity Symptoms are Monitored and Maintained or Improved:   Monitor and assess patient's chronic conditions and comorbid symptoms for stability, deterioration, or improvement   Collaborate with multidisciplinary team to address chronic and comorbid conditions and prevent exacerbation or deterioration   Update acute care plan with appropriate goals if chronic or comorbid symptoms are exacerbated and prevent overall improvement and discharge  Note: Monitored blood glucose AC/HS. The last time her blood sugar was 84. Pt remains NPO.      Problem: Discharge Planning  Goal: Discharge to home or other facility with appropriate resources  Outcome: Progressing  Flowsheets (Taken 11/6/2024 8979)  Discharge to home or other facility with appropriate resources:   Identify barriers to discharge with patient and caregiver   Arrange for needed discharge resources and transportation as appropriate   Identify discharge learning needs (meds, wound care, etc)   Arrange for interpreters to assist at discharge as needed   Refer to discharge planning if patient needs post-hospital services based on physician order or complex needs related to functional status, cognitive ability or social support system  Note: Pt is from nursing home. Plans to return there.      Problem: Skin/Tissue Integrity  Goal: Absence of new skin breakdown  Description: 1.  Monitor for areas of redness and/or skin breakdown  2.  Assess vascular access sites hourly  3.  Every 4-6 hours minimum:  Change oxygen saturation probe site  4.  Every 4-6 hours:  If on nasal continuous positive airway pressure, respiratory therapy assess nares and determine need for appliance change or resting period.  Outcome: 
  Problem: Infection - Adult  Goal: Absence of infection at discharge  Flowsheets (Taken 11/6/2024 0800)  Absence of infection at discharge:   Assess and monitor for signs and symptoms of infection   Monitor all insertion sites i.e., indwelling lines, tubes and drains   [place] appropriate cooling/warming therapies per order   Instruct and encourage patient and family to use good hand hygiene technique   Monitor lab/diagnostic results   Monitor endotracheal (as able) and nasal secretions for changes in amount and color   Administer medications as ordered   Identify and instruct in appropriate isolation precautions for identified infection/condition  Note: Lab results were monitored. The dressing of dialysis port was changed using sterile technique during the shift. CHG bath was given.      
unsuccessful or patient reports new pain   )  Free From Fall Injury: Instruct family/caregiver on patient safety     
was on the bedside for q2h to monitor.        Problem: Pain  Goal: Verbalizes/displays adequate comfort level or baseline comfort level  11/6/2024 0800 by Nelda Pineda RN  Outcome: Progressing  Flowsheets (Taken 11/6/2024 0800)  Verbalizes/displays adequate comfort level or baseline comfort level:   Encourage patient to monitor pain and request assistance   Administer analgesics based on type and severity of pain and evaluate response   Consider cultural and social influences on pain and pain management   Assess pain using appropriate pain scale   Implement non-pharmacological measures as appropriate and evaluate response   Notify Licensed Independent Practitioner if interventions unsuccessful or patient reports new pain  Note: Pain was assessed frequently. Proper prn pain med was given by the pain scale.

## 2024-11-11 VITALS
OXYGEN SATURATION: 97 % | BODY MASS INDEX: 43.05 KG/M2 | RESPIRATION RATE: 18 BRPM | DIASTOLIC BLOOD PRESSURE: 71 MMHG | HEIGHT: 66 IN | TEMPERATURE: 97.7 F | WEIGHT: 267.86 LBS | SYSTOLIC BLOOD PRESSURE: 162 MMHG | HEART RATE: 72 BPM

## 2024-11-11 LAB
ALBUMIN SERPL-MCNC: 3.6 G/DL (ref 3.4–5)
ANION GAP SERPL CALCULATED.3IONS-SCNC: 15 MMOL/L (ref 3–16)
BASOPHILS # BLD: 0.1 K/UL (ref 0–0.2)
BASOPHILS NFR BLD: 0.9 %
BUN SERPL-MCNC: 27 MG/DL (ref 7–20)
CALCIUM SERPL-MCNC: 8.6 MG/DL (ref 8.3–10.6)
CHLORIDE SERPL-SCNC: 98 MMOL/L (ref 99–110)
CO2 SERPL-SCNC: 19 MMOL/L (ref 21–32)
CREAT SERPL-MCNC: 8.8 MG/DL (ref 0.6–1.2)
DEPRECATED RDW RBC AUTO: 21.1 % (ref 12.4–15.4)
EOSINOPHIL # BLD: 1.6 K/UL (ref 0–0.6)
EOSINOPHIL NFR BLD: 11.9 %
GFR SERPLBLD CREATININE-BSD FMLA CKD-EPI: 5 ML/MIN/{1.73_M2}
GLUCOSE BLD-MCNC: 103 MG/DL (ref 70–99)
GLUCOSE BLD-MCNC: 113 MG/DL (ref 70–99)
GLUCOSE SERPL-MCNC: 91 MG/DL (ref 70–99)
HCT VFR BLD AUTO: 33.8 % (ref 36–48)
HGB BLD-MCNC: 11.1 G/DL (ref 12–16)
LYMPHOCYTES # BLD: 1.6 K/UL (ref 1–5.1)
LYMPHOCYTES NFR BLD: 12.1 %
MAGNESIUM SERPL-MCNC: 2.24 MG/DL (ref 1.8–2.4)
MCH RBC QN AUTO: 30 PG (ref 26–34)
MCHC RBC AUTO-ENTMCNC: 32.8 G/DL (ref 31–36)
MCV RBC AUTO: 91.6 FL (ref 80–100)
MONOCYTES # BLD: 1 K/UL (ref 0–1.3)
MONOCYTES NFR BLD: 7.3 %
NEUTROPHILS # BLD: 9 K/UL (ref 1.7–7.7)
NEUTROPHILS NFR BLD: 67.8 %
PERFORMED ON: ABNORMAL
PERFORMED ON: ABNORMAL
PHOSPHATE SERPL-MCNC: 4.7 MG/DL (ref 2.5–4.9)
PLATELET # BLD AUTO: 249 K/UL (ref 135–450)
PMV BLD AUTO: 6.7 FL (ref 5–10.5)
POTASSIUM SERPL-SCNC: 4.1 MMOL/L (ref 3.5–5.1)
RBC # BLD AUTO: 3.69 M/UL (ref 4–5.2)
SODIUM SERPL-SCNC: 132 MMOL/L (ref 136–145)
WBC # BLD AUTO: 13.3 K/UL (ref 4–11)

## 2024-11-11 PROCEDURE — 80069 RENAL FUNCTION PANEL: CPT

## 2024-11-11 PROCEDURE — 36415 COLL VENOUS BLD VENIPUNCTURE: CPT

## 2024-11-11 PROCEDURE — 6370000000 HC RX 637 (ALT 250 FOR IP): Performed by: INTERNAL MEDICINE

## 2024-11-11 PROCEDURE — 6360000002 HC RX W HCPCS: Performed by: INTERNAL MEDICINE

## 2024-11-11 PROCEDURE — 85025 COMPLETE CBC W/AUTO DIFF WBC: CPT

## 2024-11-11 PROCEDURE — 2580000003 HC RX 258: Performed by: INTERNAL MEDICINE

## 2024-11-11 PROCEDURE — 83735 ASSAY OF MAGNESIUM: CPT

## 2024-11-11 RX ORDER — OXYCODONE AND ACETAMINOPHEN 5; 325 MG/1; MG/1
1 TABLET ORAL EVERY 4 HOURS PRN
Qty: 9 TABLET | Refills: 0 | Status: SHIPPED | OUTPATIENT
Start: 2024-11-11 | End: 2024-11-14

## 2024-11-11 RX ORDER — PANTOPRAZOLE SODIUM 40 MG/1
40 TABLET, DELAYED RELEASE ORAL
Status: DISCONTINUED | OUTPATIENT
Start: 2024-11-11 | End: 2024-11-11 | Stop reason: HOSPADM

## 2024-11-11 RX ADMIN — SODIUM CHLORIDE, PRESERVATIVE FREE 40 MG: 5 INJECTION INTRAVENOUS at 09:38

## 2024-11-11 RX ADMIN — AMIODARONE HYDROCHLORIDE 200 MG: 200 TABLET ORAL at 09:38

## 2024-11-11 RX ADMIN — METHOCARBAMOL TABLETS 500 MG: 500 TABLET, COATED ORAL at 05:18

## 2024-11-11 RX ADMIN — SODIUM CHLORIDE, PRESERVATIVE FREE 10 ML: 5 INJECTION INTRAVENOUS at 09:39

## 2024-11-11 RX ADMIN — SEVELAMER CARBONATE 800 MG: 800 TABLET, FILM COATED ORAL at 16:32

## 2024-11-11 RX ADMIN — OXYCODONE HYDROCHLORIDE AND ACETAMINOPHEN 1 TABLET: 5; 325 TABLET ORAL at 05:19

## 2024-11-11 RX ADMIN — CETIRIZINE HYDROCHLORIDE 10 MG: 10 TABLET, FILM COATED ORAL at 09:38

## 2024-11-11 RX ADMIN — PANTOPRAZOLE SODIUM 40 MG: 40 TABLET, DELAYED RELEASE ORAL at 16:18

## 2024-11-11 RX ADMIN — CALCIUM CARBONATE 1000 MG: 500 TABLET, CHEWABLE ORAL at 09:38

## 2024-11-11 RX ADMIN — DIPHENHYDRAMINE HYDROCHLORIDE 25 MG: 25 TABLET ORAL at 00:15

## 2024-11-11 RX ADMIN — HYDROMORPHONE HYDROCHLORIDE 0.5 MG: 1 INJECTION, SOLUTION INTRAMUSCULAR; INTRAVENOUS; SUBCUTANEOUS at 11:29

## 2024-11-11 RX ADMIN — SEVELAMER CARBONATE 800 MG: 800 TABLET, FILM COATED ORAL at 09:38

## 2024-11-11 RX ADMIN — ACETAMINOPHEN 650 MG: 325 TABLET ORAL at 16:32

## 2024-11-11 RX ADMIN — SEVELAMER CARBONATE 800 MG: 800 TABLET, FILM COATED ORAL at 11:27

## 2024-11-11 RX ADMIN — TORSEMIDE 40 MG: 20 TABLET ORAL at 09:38

## 2024-11-11 RX ADMIN — OXYCODONE HYDROCHLORIDE AND ACETAMINOPHEN 1 TABLET: 5; 325 TABLET ORAL at 16:18

## 2024-11-11 RX ADMIN — CALCIUM CARBONATE 1000 MG: 500 TABLET, CHEWABLE ORAL at 16:18

## 2024-11-11 RX ADMIN — ONDANSETRON 4 MG: 4 TABLET, ORALLY DISINTEGRATING ORAL at 16:32

## 2024-11-11 RX ADMIN — CALCITRIOL CAPSULES 0.25 MCG 2 MCG: 0.25 CAPSULE ORAL at 09:37

## 2024-11-11 RX ADMIN — OXYCODONE HYDROCHLORIDE AND ACETAMINOPHEN 1 TABLET: 5; 325 TABLET ORAL at 09:38

## 2024-11-11 ASSESSMENT — PAIN DESCRIPTION - ONSET
ONSET: ON-GOING

## 2024-11-11 ASSESSMENT — PAIN SCALES - GENERAL
PAINLEVEL_OUTOF10: 9
PAINLEVEL_OUTOF10: 10
PAINLEVEL_OUTOF10: 3
PAINLEVEL_OUTOF10: 8
PAINLEVEL_OUTOF10: 7

## 2024-11-11 ASSESSMENT — PAIN DESCRIPTION - LOCATION
LOCATION: LEG

## 2024-11-11 ASSESSMENT — PAIN DESCRIPTION - DESCRIPTORS
DESCRIPTORS: ACHING

## 2024-11-11 ASSESSMENT — PAIN DESCRIPTION - ORIENTATION
ORIENTATION: RIGHT;LEFT

## 2024-11-11 ASSESSMENT — PAIN DESCRIPTION - PAIN TYPE
TYPE: ACUTE PAIN

## 2024-11-11 ASSESSMENT — PAIN - FUNCTIONAL ASSESSMENT
PAIN_FUNCTIONAL_ASSESSMENT: PREVENTS OR INTERFERES SOME ACTIVE ACTIVITIES AND ADLS

## 2024-11-11 ASSESSMENT — PAIN DESCRIPTION - FREQUENCY
FREQUENCY: CONTINUOUS

## 2024-11-11 NOTE — DISCHARGE SUMMARY
PM    HDL 40 06/10/2019 12:12 PM    TRIG 132 06/10/2019 12:12 PM     Hemoglobin A1C:   Lab Results   Component Value Date/Time    LABA1C 4.8 11/04/2024 02:36 AM     TSH:   Lab Results   Component Value Date/Time    TSH 0.78 07/25/2020 10:59 AM     Troponin: No results found for: \"TROPONINT\"  Lactic Acid: No results for input(s): \"LACTA\" in the last 72 hours.  BNP: No results for input(s): \"PROBNP\" in the last 72 hours.  UA:  Lab Results   Component Value Date/Time    NITRU Negative 11/04/2024 12:20 AM    COLORU Yellow 11/04/2024 12:20 AM    PHUR 7.0 11/04/2024 12:20 AM    PHUR 8.0 09/08/2023 08:15 AM    WBCUA 21-50 11/04/2024 12:20 AM    RBCUA 3-4 11/04/2024 12:20 AM    MUCUS 2+ 05/29/2021 06:00 AM    BACTERIA 4+ 11/04/2024 12:20 AM    CLARITYU Clear 11/04/2024 12:20 AM    LEUKOCYTESUR MODERATE 11/04/2024 12:20 AM    UROBILINOGEN 0.2 11/04/2024 12:20 AM    BILIRUBINUR Negative 11/04/2024 12:20 AM    BLOODU LARGE 11/04/2024 12:20 AM    GLUCOSEU Negative 11/04/2024 12:20 AM    KETUA Negative 11/04/2024 12:20 AM    AMORPHOUS 1+ 07/24/2020 11:24 PM     Urine Cultures:   Lab Results   Component Value Date/Time    LABURIN  11/04/2024 01:50 AM     >100,000 CFU/ml  CONTACT PRECAUTIONS INDICATED  Carbapenem antibiotics are the best option for infections caused  by ESBL producing organisms.  Other antibiotic classes are  likely to result in treatment failure, even for organisms  demonstrating in vitro susceptibility.       Blood Cultures:   Lab Results   Component Value Date/Time    BC No Growth after 4 days of incubation. 07/25/2021 08:54 PM     Lab Results   Component Value Date/Time    BLOODCULT2 No Growth after 4 days of incubation. 07/25/2021 08:54 PM     Organism:   Lab Results   Component Value Date/Time    ORG Escherichia coli ESBL 11/04/2024 01:50 AM       Time Spent Discharging patient 35 minutes    Electronically signed by Cheryl Kaur MD on 11/11/2024 at 1:08 PM

## 2024-11-11 NOTE — PROGRESS NOTES
Progress Note    Patient Florida Saleem  MRN: [id number]  YOB: 1955 Age: 69 y.o. Sex: female  Room: [address] [place] [address]       Admitting Physician: Andrade Cr MD   Date of Admission: 11/3/2024  4:25 PM   Primary Care Physician: Kim Liu MD     Subjective:  Florida Saleem was seen and examined. We are following for ischemic colitis.  -- states that pain has improved,  on exam    ROS:  Constitutional: Denies fever, no change in appetite  Respiratory: Denies cough or shortness of breath  Cardiovascular: Denies chest pain or edema    Objective:  Vital Signs:   Vitals:    11/08/24 0754   BP: (!) 154/81   Pulse: 69   Resp: 18   Temp: 98.3 °F (36.8 °C)   SpO2: 98%         Physical Exam:  Constitutional: Alert and oriented x 4. No acute distress.   HEENT: Sclera anicteric, mucosal membranes moist  Cardiovascular: Regular rate and rhythm.  No murmurs.  Respiratory: Respirations nonlabored, no crepitus  GI: Abdomen nondistended, soft, and tender.  Normal active bowel sounds.  No masses palpable.   Rectal: Deferred  Musculoskeletal:  No pitting edema of the lower legs.  Neurological: Gross memory appears intact.       Intake/Output:    Intake/Output Summary (Last 24 hours) at 11/8/2024 0832  Last data filed at 11/8/2024 0330  Gross per 24 hour   Intake 290 ml   Output 1100 ml   Net -810 ml        Current Medications:  Current Facility-Administered Medications   Medication Dose Route Frequency Provider Last Rate Last Admin    oxyCODONE-acetaminophen (PERCOCET) 5-325 MG per tablet 1 tablet  1 tablet Oral Q4H PRN Cheryl Kaur MD        heparin (porcine) injection 3,600 Units  3,600 Units IntraCATHeter PRN Laura Roberson MD        dextrose 5 % and 0.45 % NaCl 1,000 mL infusion   IntraVENous Continuous Eyal Roberson MD   Stopped at 11/07/24 1129    meropenem (MERREM) 500 mg in sodium chloride 0.9 % 100 mL IVPB (mini-bag)  500 mg IntraVENous Q24H Cheryl Kaur MD   Stopped 
    Progress Note    Patient Florida Saleem  MRN: [id number]  YOB: 1955 Age: 69 y.o. Sex: female  Room: [address] [place] [address]       Admitting Physician: Andrade Cr MD   Date of Admission: 11/3/2024  4:25 PM   Primary Care Physician: Kim Liu MD     Subjective:  Florida Saleem was seen and examined. We are following for ischemic colitis.  -- continues to be tender in abdomen. One Bm noted    ROS:  Constitutional: Denies fever, no change in appetite  Respiratory: Denies cough or shortness of breath  Cardiovascular: Denies chest pain or edema    Objective:  Vital Signs:   Vitals:    11/06/24 0841   BP: 137/79   Pulse: 71   Resp: 19   Temp: 98 °F (36.7 °C)   SpO2: 95%         Physical Exam:  Constitutional: Alert and oriented x 4. No acute distress.   HEENT: Sclera anicteric, mucosal membranes moist  Cardiovascular: Regular rate and rhythm.  No murmurs.  Respiratory: Respirations nonlabored, no crepitus  GI: Abdomen nondistended, soft, and tender.  Normal active bowel sounds.  No masses palpable.   Rectal: Deferred  Musculoskeletal:  No pitting edema of the lower legs.  Neurological: Gross memory appears intact.       Intake/Output:    Intake/Output Summary (Last 24 hours) at 11/6/2024 0858  Last data filed at 11/6/2024 0841  Gross per 24 hour   Intake 2317.82 ml   Output 520 ml   Net 1797.82 ml        Current Medications:  Current Facility-Administered Medications   Medication Dose Route Frequency Provider Last Rate Last Admin    meropenem (MERREM) 500 mg in sodium chloride 0.9 % 100 mL IVPB (mini-bag)  500 mg IntraVENous Q24H Cheryl Kaur MD 33.3 mL/hr at 11/06/24 0854 500 mg at 11/06/24 0854    dextrose 5 % solution   IntraVENous Continuous Cheryl Kaur MD 50 mL/hr at 11/05/24 1337 New Bag at 11/05/24 1337    hydrALAZINE (APRESOLINE) injection 5 mg  5 mg IntraVENous Q8H PRN Cheryl Kaur MD   5 mg at 11/05/24 1807    albuterol (PROVENTIL) (2.5 MG/3ML) 0.083% nebulizer solution 
    Progress Note    Patient Florida Saleem  MRN: [id number]  YOB: 1955 Age: 69 y.o. Sex: female  Room: [address] [place] [address]       Admitting Physician: Andrade Cr MD   Date of Admission: 11/3/2024  4:25 PM   Primary Care Physician: Kim Liu MD     Subjective:  Florida Saleem was seen and examined. We are following for ischemic colitis.  -- states that pain has improved, started on soft diet today    ROS:  Constitutional: Denies fever, no change in appetite  Respiratory: Denies cough or shortness of breath  Cardiovascular: Denies chest pain or edema    Objective:  Vital Signs:   Vitals:    11/10/24 0928   BP: (!) 159/76   Pulse: 72   Resp: 16   Temp: 97.7 °F (36.5 °C)   SpO2: 98%         Physical Exam:  Constitutional: Alert and oriented x 4. No acute distress.   HEENT: Sclera anicteric, mucosal membranes moist  Cardiovascular: Regular rate and rhythm.  No murmurs.  Respiratory: Respirations nonlabored, no crepitus  GI: Abdomen nondistended, soft. Normal active bowel sounds.  No masses palpable.   Rectal: Deferred  Musculoskeletal:  No pitting edema of the lower legs.  Neurological: Gross memory appears intact.       Intake/Output:    Intake/Output Summary (Last 24 hours) at 11/10/2024 1045  Last data filed at 11/10/2024 0605  Gross per 24 hour   Intake 660 ml   Output 850 ml   Net -190 ml        Current Medications:  Current Facility-Administered Medications   Medication Dose Route Frequency Provider Last Rate Last Admin    HYDROmorphone (DILAUDID) injection 0.5 mg  0.5 mg IntraVENous Q8H PRN Cheryl Kaur MD   0.5 mg at 11/10/24 0931    meropenem (MERREM) 500 mg in sodium chloride 0.9 % 100 mL IVPB (mini-bag)  500 mg IntraVENous Q24H Cheryl Kaur MD        oxyCODONE-acetaminophen (PERCOCET) 5-325 MG per tablet 1 tablet  1 tablet Oral Q4H PRN Cheryl Kaur MD   1 tablet at 11/09/24 1631    heparin (porcine) injection 3,600 Units  3,600 Units IntraCATHeter PRN Laura Roberson 
    V2.0    [facility] Progress Note      Name:  Florida Saleem /Age/Sex: 1955  (69 y.o. female)   MRN & CSN:  [id number] & [id number] Encounter Date/Time: 2024 7:33 AM EST   Location:  4303/4303-01 PCP: Kim Liu MD     Attending:Cheryl Kaur MD       Hospital Day: 9    HPI:  This is a 69-year-old female with past medical history significant for ESRD on HD, HTN, T2DM, AAA s/p repair, COPD presented to ER from nursing facility with complaints of bright red blood per rectum associated with diarrhea and abdominal pain. Denied any nausea or vomiting. She is only on aspirin and not on any other anticoagulation therapy. On presentation, hemodynamically stable, with physical examination revealing diffuse abdominal tenderness and rectal examination revealing bright red blood in her diaper and around her rectum. Hemoglobin at baseline 11.4, leukocytosis 14.7. CT chest abdomen pelvis with contrast revealed segment wall thickening of the proximal transverse colon to rectosigmoid representing infectious or inflammatory colitis. Complex left renal mass unchanged in size. There is a aortoiliac stent in place with small blush which may represent an endoleak without evidence of interval change. Borderline lymph nodes in the chest that may be reactive or neoplastic. General surgery and GI were consulted in the ER and patient admitted under medicine for further evaluation and management     Assessment and Recommendations     Hospital course:    Florida Saleem is a 69 y.o. female with pmh of ESRD on HD, hypertension, diabetes type 2, AAA s/p repair, who presents with bright red blood per rectum with diarrhea and abdominal pain.  Patient was admitted with acute colitis, treated as bacterial/ischemic colitis.  Concern for Endo leak from the AAA stent site as a small blush was seen on CT scan.  Vascular was consulted, repeat CT did not show a blush.  Patient had colonoscopy on 2024 with incomplete 
  Hospital Medicine Progress Note      Date of Admission: 11/3/2024  Hospital Day: 2    Chief Admission Complaint: Abdominal pain and BRBPR     Subjective: Patient seen and examined at bedside today.    Blood pressure well-controlled    Presenting Admission History:       This is a 69-year-old female with past medical history significant for ESRD on HD, HTN, T2DM, AAA s/p repair, COPD presented to ER from nursing facility with complaints of bright red blood per rectum associated with diarrhea and abdominal pain.  Denied any nausea or vomiting.  She is only on aspirin and not on any other anticoagulation therapy.  On presentation, hemodynamically stable, with physical examination revealing diffuse abdominal tenderness and rectal examination revealing bright red blood in her diaper and around her rectum.  Hemoglobin at baseline 11.4, leukocytosis 14.7.  CT chest abdomen pelvis with contrast revealed segment wall thickening of the proximal transverse colon to rectosigmoid representing infectious or inflammatory colitis.  Complex left renal mass unchanged in size.  There is a aortoiliac stent in place with small blush which may represent an endoleak without evidence of interval change.  Borderline lymph nodes in the chest that may be reactive or neoplastic.  General surgery and GI were consulted in the ER and patient admitted under medicine for further evaluation and management.    Assessment/Plan:      BRBPR  Acute colitis likely infectious versus ischemic versus inflammatory  General surgery recommend serial abdominal examination  C. difficile negative  Will obtain GI pathogen panel  N.p.o. with IV fluids at this time  IV antibiotics with Rocephin and Flagyl  IV PPI  Hemoglobin staying stable, H&H  Discussed with GI, plan to obtain KUB and if pain persist repeat CTA tomorrow.  If CTA stable plan for sigmoidoscopy.  Follow lactate    History of AAA s/p endovascular  Potential aortoiliac stent endoleak   General Surgery 
 responded to referral from palliative care. Pt shared that she has never been this sick. But hopeful to pull through. Pt \"was [employer]\" but hasn't been to Spiritism in a while due to illness. Pt is supported by her children, and family. Pt tearfully shared that \"God will help her overcome this sickness.\"  offered compassionate listening presence and prayed with pt at the end of visit. No other visits planned at this time.  Please consult [facility] for any spiritual/emotional needs.  
4 Eyes Skin Assessment     NAME:  Florida Saleem  YOB: 1955  MEDICAL RECORD NUMBER:  [id number]    The patient is being assessed for  Admission    I agree that at least one RN has performed a thorough Head to Toe Skin Assessment on the patient. ALL assessment sites listed below have been assessed.      Areas assessed by both nurses:    Head, Face, Ears, Shoulders, Back, Chest, Arms, Elbows, Hands, Sacrum. Buttock, Coccyx, Ischium, Legs. Feet and Heels, and Under Medical Devices         Does the Patient have a Wound? No noted wound(s)       Reginald Prevention initiated by RN: Yes  Wound Care Orders initiated by RN: No    Pressure Injury (Stage 3,4, Unstageable, DTI, NWPT, and Complex wounds) if present, place Wound referral order by RN under : No    New Ostomies, if present place, Ostomy referral order under : No     Nurse 1 eSignature: Electronically signed by Mindy Saunders RN on 11/3/24 at 9:59 PM EST    **SHARE this note so that the co-signing nurse can place an eSignature**    Nurse 2 eSignature: Electronically signed by Luz Maria Mcguire RN on 11/3/24 at 11:08 PM EST    
Blood Sugar around noon 67. Patient NPO except sips of meds with water. Dextrose given per PRN MAR, recheck BS was 94. Perfect Serve message sent to on call hospitalist Cheryl Kaur MD to make aware.   
Blood Sugar tonight was 68.  Patient NPO except sips of meds with water.  Dextrose given per PRN MAR, recheck BS was 132.  Patient due Lantus tonight and BS t/o day <100.  Perfect Serve message sent to on call hospitalist TARIQ Dent to make aware.  Stated to hold Lantus tonight.    
Blood sugar this evening 60. Rechecked blood sugar is 78.   
Called to give report and went straight to .   
Clinical Pharmacy Consult Note  Medication History     Admit Date: 11/3/2024    List of current medications patient is taking is complete. Home Medication list in EPIC updated to reflect changes noted below.    Source of information: I used the patients assisted living transfer papers.    Patient's home pharmacy: [id number] - [facility] [address] - P [phone] - F [phone]      Changes made to medication list:   Medications removed: (include reason, ex: therapy completed, patient no longer taking, etc.)  Methylprednisolone- Not on list  Medications added:   Benadryl  Oxycodone 5 mg  Medication doses adjusted:   Albuterol nebulizer solution 3 mL Q4H PRN (not 3 mL Q12H PRN)  Calcitriol 2 mcg BID (not 0.5 mcg once daily)  Cardizem CD (not Tiazac)  Divalproex 125 mg QHS (not 125 mg BID)  Methocarbamol 500 mg Q6H PRN (not 500 mg QID)  Oxycodone 10 mg Q4H (not 10 mg Q4H PRN)  Senna (not Senokot-S)  Sevelamer 800 mg TID WM (not 1600 mg TID WM)  Temazepam 15 mg QHS (not 15 mg QHS PRN)  Other notes:   None    Current Outpatient Medications   Medication Instructions    acetaminophen (TYLENOL) 650 mg, Oral, EVERY 6 HOURS PRN    albuterol (PROVENTIL) 2.5 mg, Nebulization, EVERY 4 HOURS PRN    amiodarone (CORDARONE) 200 mg, Oral, 2 TIMES DAILY    aspirin 81 mg, Oral, DAILY    atorvastatin (LIPITOR) 40 mg, Oral, Nightly    calcitRIOL (ROCALTROL) 0.5 MCG capsule 4 capsules, Oral, 2 TIMES DAILY    Calcium Carbonate Antacid 1000 MG CHEW 1 tablet, Oral, 3 TIMES DAILY    dilTIAZem (CARDIZEM CD) 300 mg, Oral, Nightly    diphenhydrAMINE (BENADRYL) 25 mg, Oral, EVERY 4 HOURS PRN    divalproex (DEPAKOTE) 125 mg, Oral, Nightly    loratadine (CLARITIN) 10 mg, Oral, EVERY OTHER DAY    methocarbamol (ROBAXIN) 500 mg, Oral, EVERY 6 HOURS PRN    metoprolol succinate (TOPROL XL) 50 mg, Oral, Nightly    Multiple Vitamins-Minerals (THERAPEUTIC MULTIVITAMIN-MINERALS) tablet 1 tablet, Oral, DAILY    
Colorectal Surgery   Daily Progress Note  Patient: Florida Saleem      CC: Ischemic colitis    SUBJECTIVE:   NAEON. Patient resting comfortably in bed. Reports of improved abdominal pain. Denies of nausea or vomiting. Passing gas, having BM. No other complaints at this time.     OBJECTIVE:    PHYSICAL EXAM:    Vitals:    11/07/24 0200 11/07/24 0449 11/07/24 0519 11/07/24 0600   BP:  130/77     Pulse: 75 67  70   Resp:  18 16    Temp:  98.4 °F (36.9 °C)     TempSrc:  Oral     SpO2:  95%     Weight:   121.1 kg (266 lb 15.6 oz)    Height:           General appearance: alert, no acute distress, grooming appropriate  Chest/Lungs: Normal effort with no accessory muscle use on RA  Cardiovascular: RRR  Abdomen: soft, obese, non-distended, no rigidity. Mild tenderness to right abdomen but distractible.   Neuro: A&Ox3      ASSESSMENT & PLAN:   This is a 69 y.o. female with Hx of HTN, HLD, DM, COPD, ESRD on hemodialysis (Tu/Th/Sat), parathyroidectomy (8/2024), appendectomy, cholecystectomy, and AAA s/p endovascular repair (9/2023) presents from nursing home with c/o rectal bleeding and abdominal pain. Currently hemodynamically stable, afebrile. Labs significant for hyponatremia (128), hyperkalemia (6.0), creatinine 7.5 (baseline 7-11), and leukocytosis of 14.7 with left shift. CTA chest, abd, pelvis revealed concerns for infectious vs inflammatory colitis and potential endoleak from aortoiliac stent. General surgery has been consulted for evaluation of GI bleed and colitis.     - Abdominal exam improved today   - C.Diff study negative  - Flex sig 11/5/2024: severe patchy erythematous mucosa with ulcerations noted at sigmoid and descending colon. Biopsy was obtained. Pending results. GI following  - Patient adamantly refusing the possibility of an ostomy, patient states she would not agree to ostomy under any circumstance.   - Continue IVF, IV antibiotics  - Rest of care per primary team.     Belinda Garza, DO  PGY1, General 
Colorectal Surgery   Daily Progress Note  Patient: Florida Saleem      CC: Rectal bleeding    SUBJECTIVE:   No acute events overnight. Continues to have abdominal pain, stable. No BM last PM, passing gas.     OBJECTIVE:    PHYSICAL EXAM:    Vitals:    11/06/24 0223 11/06/24 0506 11/06/24 0641 11/06/24 0841   BP:  121/75  137/79   Pulse:  73  71   Resp: 20 20 20 19   Temp:  98.8 °F (37.1 °C)  98 °F (36.7 °C)   TempSrc:  Oral  Oral   SpO2:  96%  95%   Weight:   119.9 kg (264 lb 5.3 oz)    Height:           General appearance: alert, no acute distress, grooming appropriate  Chest/Lungs: Normal effort with no accessory muscle use on RA  Cardiovascular: RRR  Abdomen: soft, obese, non-distended, no rigidity. Patient distractible and had no initial abdominal tenderness on palpation but when asked, states she has pain when moving.   Rectal: Deferred at this time due to patient refusal  Neuro: A&Ox3, no focal deficits      ASSESSMENT & PLAN:   This is a 69 y.o. female with Hx of HTN, HLD, DM, COPD, ESRD on hemodialysis (Tu/Th/Sat), parathyroidectomy (8/2024), appendectomy, cholecystectomy, and AAA s/p endovascular repair (9/2023) presents from nursing home with c/o rectal bleeding and abdominal pain. Currently hemodynamically stable, afebrile. Labs significant for hyponatremia (128), hyperkalemia (6.0), creatinine 7.5 (baseline 7-11), and leukocytosis of 14.7 with left shift. CTA chest, abd, pelvis revealed concerns for infectious vs inflammatory colitis and potential endoleak from aortoiliac stent. General surgery has been consulted for evaluation of GI bleed and colitis.     - Abdominal exam unchanged   - C.Diff study negative  - GI has been consulted for rectal bleeding. Appreciate recommendations.  - CTA with transverse colon to sigmoid wall thickening  - given surgical history and dialysis favor ischemic colitis    - Patient adamantly refusing the possibility of an ostomy, patient states she would not agree to ostomy 
Colorectal Surgery   Daily Progress Note  Patient: Florida Saleem      CC: Rectal bleeding    SUBJECTIVE:   Patient states that her abdomen is still hurting, specifically after she is moved around. However, the pain is not worsening.     OBJECTIVE:    PHYSICAL EXAM:    Vitals:    11/04/24 2301 11/05/24 0053 11/05/24 0333 11/05/24 0340   BP: (!) 147/70  129/66    Pulse: 75  62    Resp: 16  16 16   Temp: 99 °F (37.2 °C) 99.2 °F (37.3 °C) 98.8 °F (37.1 °C)    TempSrc: Oral Oral Oral    SpO2: 93%  93%    Weight:       Height:           General appearance: alert, no acute distress, grooming appropriate  Chest/Lungs: Normal effort with no accessory muscle use on RA  Cardiovascular: RRR  Abdomen: soft, obese, non-distended, no rigidity. Patient distractible and had no initial abdominal tenderness on palpation but when asked, states she has pain when moving.   Rectal: Deferred at this time due to patient refusal  Neuro: A&Ox3, no focal deficits      ASSESSMENT & PLAN:   This is a 69 y.o. female with Hx of HTN, HLD, DM, COPD, ESRD on hemodialysis (Tu/Th/Sat), parathyroidectomy (8/2024), appendectomy, cholecystectomy, and AAA s/p endovascular repair (9/2023) presents from nursing home with c/o rectal bleeding and abdominal pain. Currently hemodynamically stable, afebrile. Labs significant for hyponatremia (128), hyperkalemia (6.0), creatinine 7.5 (baseline 7-11), and leukocytosis of 14.7 with left shift. CTA chest, abd, pelvis revealed concerns for infectious vs inflammatory colitis and potential endoleak from aortoiliac stent. General surgery has been consulted for evaluation of GI bleed and colitis.     - Abdominal exam unchanged   - C.Diff study negative  - GI has been consulted for rectal bleeding. Appreciate recommendations.  - Repeat CTA today, will follow up results.  - Patient adamantly refusing the possibility of an ostomy, patient states she would not agree to ostomy under any circumstance.   - Continue IVF, IV 
GI Note:    Attempted to see patient but is off the floor. Will attempt to see her later.  Call with questions as needed.    Roseanna Preston PA-C  
GI Note:    Imaging and blood work reviewed. Case discussed with Dr. Roberson. Plan for colonoscopy/flex sig today to further evaluate colitis/rectal bleeding.  Golytely ordered for today that needs to be completed by 12 pm. Spoke with nurse.    Roseanna Gandhi PA-C  
General Surgery   Daily Progress Note  Patient: Florida Saleem      CC: Ischemic colitis    SUBJECTIVE:   NAEON. Patient resting comfortably in bed. In no acute distress. Denies of abdominal pain. Tolerating soft diet with no nausea or vomiting. No other complaints at this time.     OBJECTIVE:    PHYSICAL EXAM:    Vitals:    11/09/24 1631 11/09/24 2005 11/10/24 0010 11/10/24 0400   BP: (!) 156/77 (!) 160/89 (!) 156/97 (!) 158/78   Pulse: 68 71 68 69   Resp: 18 16 16 16   Temp: 98 °F (36.7 °C) 98 °F (36.7 °C) 97.9 °F (36.6 °C) 97.8 °F (36.6 °C)   TempSrc: Oral Oral Oral Oral   SpO2: 96% 96% 96% 96%   Weight:       Height:           General appearance: alert, no acute distress, grooming appropriate  Chest/Lungs: Normal effort with no accessory muscle use on RA  Cardiovascular: RRR  Abdomen: soft, obese, non-distended, no rigidity. non tender.   Neuro: A&Ox3      ASSESSMENT & PLAN:   This is a 69 y.o. female with Hx of HTN, HLD, DM, COPD, ESRD on hemodialysis (Tu/Th/Sat), parathyroidectomy (8/2024), appendectomy, cholecystectomy, and AAA s/p endovascular repair (9/2023) presents from nursing home with c/o rectal bleeding and abdominal pain. Currently hemodynamically stable, afebrile. Labs significant for hyponatremia (128), hyperkalemia (6.0), creatinine 7.5 (baseline 7-11), and leukocytosis of 14.7 with left shift. CTA chest, abd, pelvis revealed concerns for infectious vs inflammatory colitis and potential endoleak from aortoiliac stent. General surgery has been consulted for evaluation of GI bleed and colitis.     - Abdominal exam unchanged and stable today  - Flex sig 11/5/2024: severe patchy erythematous mucosa with ulcerations noted at sigmoid and descending colon. Biopsy revealed ischemic colitis.   - Patient is still adamantly refusing the possibility of an ostomy under any circumstance.   - Will continue to monitor for changes in abdominal exam  - Okay for diet in surgery standpoint  - Rest of care per 
General Surgery   Daily Progress Note  Patient: Florida Saleem      CC: Ischemic colitis    SUBJECTIVE:   NAEON. Resting comfortably in bed. Pain well controlled. Reports of improved abdominal pain today. Tolerating CLD with no nausea or vomiting     OBJECTIVE:    PHYSICAL EXAM:    Vitals:    11/07/24 2034 11/07/24 2349 11/08/24 0330 11/08/24 0408   BP:  (!) 143/90 (!) 146/84    Pulse:  63 65    Resp: 19 18 16 17   Temp:  98.2 °F (36.8 °C) 98 °F (36.7 °C)    TempSrc:  Oral Oral    SpO2:  96% 96%    Weight:       Height:           General appearance: alert, no acute distress, grooming appropriate  Chest/Lungs: Normal effort with no accessory muscle use on RA  Cardiovascular: RRR  Abdomen: soft, obese, non-distended, no rigidity. Mild tenderness to right abdomen but distractible.   Neuro: A&Ox3      ASSESSMENT & PLAN:   This is a 69 y.o. female with Hx of HTN, HLD, DM, COPD, ESRD on hemodialysis (Tu/Th/Sat), parathyroidectomy (8/2024), appendectomy, cholecystectomy, and AAA s/p endovascular repair (9/2023) presents from nursing home with c/o rectal bleeding and abdominal pain. Currently hemodynamically stable, afebrile. Labs significant for hyponatremia (128), hyperkalemia (6.0), creatinine 7.5 (baseline 7-11), and leukocytosis of 14.7 with left shift. CTA chest, abd, pelvis revealed concerns for infectious vs inflammatory colitis and potential endoleak from aortoiliac stent. General surgery has been consulted for evaluation of GI bleed and colitis.     - Abdominal exam stable and improved today   - C.Diff study negative  - Flex sig 11/5/2024: severe patchy erythematous mucosa with ulcerations noted at sigmoid and descending colon. Biopsy was obtained. Pending results. GI following  - Patient adamantly refusing the possibility of an ostomy, patient states she would not agree to ostomy under any circumstance.   - Will continue to monitor for changes in abdominal exam  - Okay for diet in surgery standpoint  - Rest of 
General Surgery   Daily Progress Note  Patient: [place] Saleem      CC: Ischemic colitis    SUBJECTIVE:   Patient seen in dialysis wing. Comfortable. States abdominal pain is significantly improved. Tolerated mashed potatoes yesterday without NV. No BM, passing gas.     OBJECTIVE:    PHYSICAL EXAM:    Vitals:    11/09/24 0446 11/09/24 0449 11/09/24 0519 11/09/24 0707   BP: (!) 158/74   (!) 158/80   Pulse: 62   63   Resp:  17 16 16   Temp: 97.6 °F (36.4 °C)   98 °F (36.7 °C)   TempSrc: Oral      SpO2: 95%      Weight:    121.5 kg (267 lb 13.7 oz)   Height:           General appearance: alert, no acute distress, grooming appropriate  Chest/Lungs: Normal effort with no accessory muscle use on RA  Cardiovascular: RRR  Abdomen: soft, obese, non-distended, no rigidity. non tender.   Neuro: A&Ox3      ASSESSMENT & PLAN:   This is a 69 y.o. female with Hx of HTN, HLD, DM, COPD, ESRD on hemodialysis (Tu/Th/Sat), parathyroidectomy (8/2024), appendectomy, cholecystectomy, and AAA s/p endovascular repair (9/2023) presents from nursing home with c/o rectal bleeding and abdominal pain. Currently hemodynamically stable, afebrile. Labs significant for hyponatremia (128), hyperkalemia (6.0), creatinine 7.5 (baseline 7-11), and leukocytosis of 14.7 with left shift. CTA chest, abd, pelvis revealed concerns for infectious vs inflammatory colitis and potential endoleak from aortoiliac stent. General surgery has been consulted for evaluation of GI bleed and colitis.     - Abdominal exam improved today  - Flex sig 11/5/2024: severe patchy erythematous mucosa with ulcerations noted at sigmoid and descending colon. Biopsy was obtained. Pending results. GI following  - Patient adamantly refusing the possibility of an ostomy, patient states she would not agree to ostomy under any circumstance.   - Will continue to monitor for changes in abdominal exam  - Okay for diet in surgery standpoint  - Rest of care per primary team.   - Surgery will 
H&H ordered Q6 hours. RN spoke with  who states patient had been refusing blood draws today. RN spoke and educated with patient and patient continues to refuse. Last blood draw was at 0924 and H&H 11.7/36.7. [employer] message sent to on call Hospitalist, Cheryl Kaur MD to make aware.   
H/H ordered Q6 hours.RN spoke with  at  1930 who states patient had been refusing blood draws t/o day.  Patient refused blood draw as well tonight. RN spoke with patient and she continues to refuse.  Last blood draw was at 0236 and H/H 10.7/36.9.  No BM's tonight or S/S of bleeding.  Perfect Serve message sent to on call Hospitalist, Analy Wang-Johnny to make aware.  
Nephrology Note                                                                                                                                                                                                                                                                                                                                                               Office : [phone]     Fax :[phone]    Patient's Name: [place] Saleem    11/6/2024    Reason for Consult:  Hemodialysis   Requesting Physician:  Kim Liu MD  Chief Complaint:    Chief Complaint   Patient presents with    Rectal Bleeding     Since 10a this morning.  Two occurrences       Assessment:    # Acute ischemic colitis   - Colonoscopy done 11/5     # Enlarging abdominal aortic aneurysm   - S/p emergent EVAR with goretex graft 9/8  - Vascular surgery on board     # ESRD   - on hemodialysis T/Th/Sat, next HD in am    # HTN   - BP in fair control    # Anemia  - EPO qHD    # Acid- base/ Electrolyte imbalance     # Left renal cyst  - Not a surgical candidate, previously seen by urology    Plan:  - Continue HD TTS schedule   - Renal diet  - MBD management   - Dose meds to GFR < 10  - Continue to monitor renal labs     History of Present Ilness:    69-year-old female with history of ESRD on hemodialysis, hypertension, diabetes, AAA s/p repair, COPD admitted with     abdominal pain and rectal bleeding. CT angiogram 11/3 showed long segment wall thickening from proximal transverse colon to rectosigmoid, complex left renal masses, aortoiliac stent in place, small blush seen within the excluded aneurysm may represent small endoleak.     Interval hx:         Past Medical History:   Diagnosis Date    Asthma     Chronic pain     Chronic, continuous use of opioids     COPD (chronic obstructive pulmonary disease) (HCC)     Dental disease     Diabetes mellitus (HCC)     Gout     Hearing loss     Hyperlipidemia     Hypertension     Other disorders of 
Nephrology Note                                                                                                                                                                                                                                                                                                                                                               Office : [phone]     Fax :[phone]    Patient's Name: [place] Saleem    11/10/2024    Reason for Consult:  Hemodialysis   Requesting Physician:  Kim Liu MD  Chief Complaint:    Chief Complaint   Patient presents with    Rectal Bleeding     Since 10a this morning.  Two occurrences       Assessment:    # Acute ischemic colitis   - Colonoscopy and biopsies done 11/5     # Enlarging abdominal aortic aneurysm   - S/p emergent EVAR with goretex graft 9/8  - Vascular surgery on board     # ESRD   - HD in am, tolerating well  - on hemodialysis T/Th/Sat    # HTN   - BP in fair control    # Anemia  - EPO qHD    # Acid- base/ Electrolyte imbalance     # Left renal cyst  - Not a surgical candidate, previously seen by urology    Plan:  - Continue HD TTS schedule   - Renal diet  - MBD management   - Dose meds to GFR < 10  - Continue to monitor renal labs     History of Present Ilness:    69-year-old female with history of ESRD on hemodialysis, hypertension, diabetes, AAA s/p repair, COPD admitted with     abdominal pain and rectal bleeding. CT angiogram 11/3 showed long segment wall thickening from proximal transverse colon to rectosigmoid, complex left renal masses, aortoiliac stent in place, small blush seen within the excluded aneurysm may represent small endoleak.     Interval hx:     S/p HD yesterday gary well     Past Medical History:   Diagnosis Date    Asthma     Chronic pain     Chronic, continuous use of opioids     COPD (chronic obstructive pulmonary disease) (HCC)     Dental disease     Diabetes mellitus (HCC)     Gout     Hearing loss     
Nephrology Note                                                                                                                                                                                                                                                                                                                                                               Office : [phone]     Fax :[phone]    Patient's Name: [place] Saleem    11/8/2024    Reason for Consult:  Hemodialysis   Requesting Physician:  Kim Liu MD  Chief Complaint:    Chief Complaint   Patient presents with    Rectal Bleeding     Since 10a this morning.  Two occurrences       Assessment:    # Acute ischemic colitis   - Colonoscopy and biopsies done 11/5     # Enlarging abdominal aortic aneurysm   - S/p emergent EVAR with goretex graft 9/8  - Vascular surgery on board     # ESRD   - HD in am, tolerating well  - on hemodialysis T/Th/Sat    # HTN   - BP in fair control    # Anemia  - EPO qHD    # Acid- base/ Electrolyte imbalance     # Left renal cyst  - Not a surgical candidate, previously seen by urology    Plan:  - Continue HD TTS schedule   - Renal diet  - MBD management   - Dose meds to GFR < 10  - Continue to monitor renal labs     History of Present Ilness:    69-year-old female with history of ESRD on hemodialysis, hypertension, diabetes, AAA s/p repair, COPD admitted with     abdominal pain and rectal bleeding. CT angiogram 11/3 showed long segment wall thickening from proximal transverse colon to rectosigmoid, complex left renal masses, aortoiliac stent in place, small blush seen within the excluded aneurysm may represent small endoleak.     Interval hx:         Past Medical History:   Diagnosis Date    Asthma     Chronic pain     Chronic, continuous use of opioids     COPD (chronic obstructive pulmonary disease) (HCC)     Dental disease     Diabetes mellitus (HCC)     Gout     Hearing loss     Hyperlipidemia     
Nephrology Note                                                                                                                                                                                                                                                                                                                                                               Office : [phone]     Fax :[phone]    Patient's Name: [place] Saleem    11/7/2024    Reason for Consult:  Hemodialysis   Requesting Physician:  Kim Liu MD  Chief Complaint:    Chief Complaint   Patient presents with    Rectal Bleeding     Since 10a this morning.  Two occurrences       Assessment:    # Acute ischemic colitis   - Colonoscopy done 11/5     # Enlarging abdominal aortic aneurysm   - S/p emergent EVAR with goretex graft 9/8  - Vascular surgery on board     # ESRD   - HD today and seen on HD machine   - on hemodialysis T/Th/Sat    # HTN   - BP in fair control    # Anemia  - EPO qHD    # Acid- base/ Electrolyte imbalance     # Left renal cyst  - Not a surgical candidate, previously seen by urology    Plan:  - Continue HD TTS schedule   - Renal diet  - MBD management   - Dose meds to GFR < 10  - Continue to monitor renal labs     History of Present Ilness:    69-year-old female with history of ESRD on hemodialysis, hypertension, diabetes, AAA s/p repair, COPD admitted with     abdominal pain and rectal bleeding. CT angiogram 11/3 showed long segment wall thickening from proximal transverse colon to rectosigmoid, complex left renal masses, aortoiliac stent in place, small blush seen within the excluded aneurysm may represent small endoleak.     Interval hx:         Past Medical History:   Diagnosis Date    Asthma     Chronic pain     Chronic, continuous use of opioids     COPD (chronic obstructive pulmonary disease) (HCC)     Dental disease     Diabetes mellitus (HCC)     Gout     Hearing loss     Hyperlipidemia     Hypertension     
Nephrology Note                                                                                                                                                                                                                                                                                                                                                               Office : [phone]     Fax :[phone]    Patient's Name: [place] Saleem    11/9/2024    Reason for Consult:  Hemodialysis   Requesting Physician:  Kim Liu MD  Chief Complaint:    Chief Complaint   Patient presents with    Rectal Bleeding     Since 10a this morning.  Two occurrences       Assessment:    # Acute ischemic colitis   - Colonoscopy and biopsies done 11/5     # Enlarging abdominal aortic aneurysm   - S/p emergent EVAR with goretex graft 9/8  - Vascular surgery on board     # ESRD   - HD in am, tolerating well  - on hemodialysis T/Th/Sat    # HTN   - BP in fair control    # Anemia  - EPO qHD    # Acid- base/ Electrolyte imbalance     # Left renal cyst  - Not a surgical candidate, previously seen by urology    Plan:  - Continue HD TTS schedule   - Renal diet  - MBD management   - Dose meds to GFR < 10  - Continue to monitor renal labs     History of Present Ilness:    69-year-old female with history of ESRD on hemodialysis, hypertension, diabetes, AAA s/p repair, COPD admitted with     abdominal pain and rectal bleeding. CT angiogram 11/3 showed long segment wall thickening from proximal transverse colon to rectosigmoid, complex left renal masses, aortoiliac stent in place, small blush seen within the excluded aneurysm may represent small endoleak.     Interval hx:     Pt seen on HD gary well         Past Medical History:   Diagnosis Date    Asthma     Chronic pain     Chronic, continuous use of opioids     COPD (chronic obstructive pulmonary disease) (HCC)     Dental disease     Diabetes mellitus (HCC)     Gout     Hearing loss     
Notification of IV to PO Conversion     IV To Po Conversion:   Will convert pantoprazole 40 mg IV q12h to pantoprazole 40 mg PO q12h based on [facility] IV to PO policy (see below).     Please call with questions.  Jose ScanlonD, BCPS  Clinical Pharmacist  [id number]      Criteria for conversion from IV to PO therapy per [facility] IV to PO Protocol:   Patients should meet all of the following inclusion criteria and none of the exclusion criteria    Criteria to initiate medication route switch (Inclusion Criteria)  IV therapy for > 24 hours (antibiotics only)  Tolerating diet more advanced than clear liquids  Tolerating oral (PO) medications  Does not require vasopressor therapy for blood pressure support  No seizures for 72hrs (antiepileptic medications only)      Criteria indicating that the patient is NOT a candidate for IV to PO conversion (Exclusion Criteria)  Infections requiring IV therapy (ie: meningitis, endocarditis, osteomyelitis, pancreatitis)  Nausea and/or vomiting or severe diarrhea within past 24 hours  Has gastrectomy, ileus, gastric outlet or bowel obstruction, or malabsorption syndromes  Has significant, painful oral ulceration  TPN with an NPO order  Active GI bleed  Unable to swallow  Nothing by Mouth (NPO) status  Febrile in the last 24 hours- (antibiotics only)  Clinical deteriorating or unstable - (antibiotics only)  Pediatric patients and patients who are not euthyroid (not on oral levothyroxine/not stabilized on oral levothyroxine) - (Levothyroxine only)  Patients being treated for myxedema coma or during the organ donation process - (Levothyroxine only)    Other notes-   Patients may be given suppositories when available for the product being ordered if no contraindications exist (ie: rectal surgery or infection)   Oral solutions/suspensions may be considered for patients with a functioning enteral tube not on continuous suction (if medication is available in this formulation)      
Patient incontinent of large loose, mucousy, red/rust colored BM this AM.  Stool collected at sent to lab for GI pathogens.  Surgical residents made aware of BM during rounds.    
Patient off unit. Patient at Endoscopy.   
Patient still of unit. Patient going to endoscopy to dialysis.   
Patient transferred to 4 PCU from dialysis. Patient wanted off dialysis early. Hand off report given to WYATT Musa and WYATT Lutz.   
Pt took 4/8 tabs of 0.25 mcg of calcitRIOL. Pt refused to take the whole dose.   
Serial abdominal exam    On exam, patient remains soft but very tender to palpation of epigastric and bilateral lower quadrants. Reports pain has not increased and is about the same as this AM. No guarding/rebound tenderness appreciated, and patient continues to be distractible during exam.     We will continue to follow with serial exams.       Maura Gutierrez DO, MSMEd  PGY-3, General Surgery  11/04/24  10:38 PM  [place]  
Surgery Daily Progress Note      CC: concern for endoleak    SUBJECTIVE:  Denies bowel movement overnight. Reports flatus, continues with abdominal pain.       OBJECTIVE:    PHYSICAL EXAM:  Vitals:    11/06/24 0223 11/06/24 0506 11/06/24 0641 11/06/24 0841   BP:  121/75  137/79   Pulse:  73  71   Resp: 20 20 20 19   Temp:  98.8 °F (37.1 °C)  98 °F (36.7 °C)   TempSrc:  Oral  Oral   SpO2:  96%  95%   Weight:   119.9 kg (264 lb 5.3 oz)    Height:           General appearance: alert, no acute distress  Neuro: A&Ox3, no focal deficits  Chest/Lungs: normal effort, no accessory muscle use, on RA  Cardiovascular:  palpable femoral and DP/PT pulses bilaterally.   Abdomen: soft, non-distended, tender to palpation primarily in epigastrium and bilateral lower quadrants but distractible  Extremities: no edema, no cyanosis      ASSESSMENT & PLAN:   This is a 69 y.o. female known to vascular surgery for prior EVAR in Sept 2023. Patient admitted for abdominal pain and GI bleeding being worked up by medical and general surgery teams. Vascular surgery consulted due to concern for endoleak on CTA obtained during ED work up        - No acute vascular surgery intervention for endoleak.   -Vascular surgery to sign off at this time, please reach out with any additional questions or concerns.   -Vascular to see patient in outpatient clinic in 2 weeks.    Jana Govea CNP   General Surgery  11/06/24  10:13 AM        
Surgery Daily Progress Note      CC: concern for endoleak    SUBJECTIVE:  No acute events overnight. Continues to report abdominal pain. Was being followed by general surgery overnight.       OBJECTIVE:    PHYSICAL EXAM:  Vitals:    11/04/24 0235 11/04/24 0302 11/04/24 0305 11/04/24 0713   BP:  131/86     Pulse:  64     Resp: 16 16 16 16   Temp:  98.9 °F (37.2 °C)     TempSrc:  Oral     SpO2:  100%     Weight:  115.8 kg (255 lb 4.7 oz)     Height:           General appearance: alert, no acute distress  Neuro: A&Ox3, no focal deficits  Chest/Lungs: normal effort, no accessory muscle use, on RA  Cardiovascular: RRR, palpable femoral and DP/PT pulses bilaterally.   Abdomen: soft, non-distended, tender to palpation primarily in epigastrium and bilateral lower quadrants but distractible  Extremities: no edema, no cyanosis      ASSESSMENT & PLAN:   This is a 69 y.o. female known to vascular surgery for prior EVAR in Sept 2023. Patient admitted for abdominal pain and GI bleeding being worked up by medical and general surgery teams. Vascular surgery consulted due to concern for endoleak on CTA obtained during ED work up    - CTA stable compared to prior in 2023, no expansion of aneurysm sac  - No acute vascular surgery intervention for endoleak   - work up of GI bleeding and abdominal pain per general surgery.    Hannah Mccarty MD  PGY4, General Surgery  11/04/24  7:41 AM  [employer]  [phone]      
Surgery Daily Progress Note      CC: concern for endoleak    SUBJECTIVE:  Reports she had a bowel movement that was described as ashley.       OBJECTIVE:    PHYSICAL EXAM:  Vitals:    11/04/24 2301 11/05/24 0053 11/05/24 0333 11/05/24 0340   BP: (!) 147/70  129/66    Pulse: 75  62    Resp: 16  16 16   Temp: 99 °F (37.2 °C) 99.2 °F (37.3 °C) 98.8 °F (37.1 °C)    TempSrc: Oral Oral Oral    SpO2: 93%  93%    Weight:       Height:           General appearance: alert, no acute distress  Neuro: A&Ox3, no focal deficits  Chest/Lungs: normal effort, no accessory muscle use, on RA  Cardiovascular:  palpable femoral and DP/PT pulses bilaterally.   Abdomen: soft, non-distended, tender to palpation primarily in epigastrium and bilateral lower quadrants but distractible  Extremities: no edema, no cyanosis      ASSESSMENT & PLAN:   This is a 69 y.o. female known to vascular surgery for prior EVAR in Sept 2023. Patient admitted for abdominal pain and GI bleeding being worked up by medical and general surgery teams. Vascular surgery consulted due to concern for endoleak on CTA obtained during ED work up        - will follow up on final CTA read  - No acute vascular surgery intervention for endoleak   - work up of GI bleeding and abdominal pain per general surgery.    Jana Govea CNP   General Surgery  11/05/24  7:18 AM        
Surgery interval update    Patient seen for serial exam.     On exam, patient remains soft but very tender to palpation of epigastric and bilateral lower quadrants. Reports pain has not increased and is about the same as this am. No guarding, or rebound tenderness appreciated, Patient distractible during exam.    We will continue to follow with serial exams.       Jana Govea CNP  General Surgery  11/04/24  2:09 PM      
Surgery interval update    Patient seen for serial exam. On arrival to room, patient sleeping very soundly. When awakened, patient reports pain is now 8/10 compared to previous report of 9/10 in ED.     On exam, patient remains soft but very tender to palpation of epigastric and bilateral lower quadrants. I discussed with her my concern that she has significant pain on palpation and asked her if she thought her pain had gotten worse. She stated that she did not think her pain had gotten worse.     When questioned on if she thought her pain was so severe that she may need surgery, patient reported she was not sure and would defer to the doctors.       I asked patient if she had further rectal bleeding and she said that she thought she had.  She was able to turn in bed without significant abdominal pain.  I was not able to see any soiling of her brief though.  When I then palpated her abdomen again, she did not respond with any pain with palpation.    Overall, patient with very inconsistent report of pain and remains distractible with palpation. She remains HDS.  We will continue to follow with serial exams.       Hannah Mccarty MD  PGY4, General Surgery  11/04/24  12:17 AM  [facility]  [phone]    
[facility] - Clinical Pharmacy Note - Extended Infusion Beta-Lactam Adjustment    Meropenem ordered for treatment of UTI. Per [facility] Extended Infusion Beta-Lactam Policy, Meropenem will be changed to 1000mg IV x1, followed by 500 mg IV q24h.     Estimated Creatinine Clearance: n/a   Dialysis Status, JUSTINE, CKD: ESRD on HD  BMI: Body mass index is 41.63 kg/m².    Rationale for Adjustment: Agent is renally eliminated and demonstrates time-dependent effect on bacterial eradication. Extended-infusion dosing strategy aims to enhance microbiologic and clinical efficacy.    Pharmacy will continue to monitor renal function and adjust dose as necessary.      Please call with questions--  Pavithra Casey, PharmD, BCPS  Wireless: [phone]   11/5/2024 8:08 AM        
This nurse offered speciality mattress. Patient refused and states, \"I am not doing anything tonight. I've already moved out of the bed once\".   
Treatment time: 3 hours     Net UF: 1.5 L     Pre weight: 121.5 kg  Post weight: 119.0kg           EDW: 119 kg       Access used: R chest TDC   Access function: tolerated 350 BFR     Medications or blood products given: Heparin dwells     Regular outpatient schedule: TTS     Summary of response to treatment: tolerated well. Uncomplicated. Labs drawn pre HD.     Copy of dialysis treatment record placed in chart, to be scanned into EMR.  
Treatment time: 3 hours  Net UF: 1000 ml     Pre weight: 121.1 kg  Post weight:120.1 kg    Access used: CVC    Access function: good with  ml/min     Medications or blood products given: na     Regular outpatient schedule: TTS     Summary of response to treatment: Patient tolerated treatment well and without any complications. Report given to Marialuisa  .  Copy of dialysis treatment record placed in chart, to be scanned into EMR.  
mesenteric ischemia. Differential diagnosis includes hemorrhoids and inflammatory colitis   - When patient is amendable, will perform a digital rectal exam.    - C.Diff study negative   - Will follow-up on morning labs  - GI has been consulted for rectal bleeding. Appreciate recommendations   - Remain NPO for now in the setting of possible colonoscopy/EGD by GI today  - Continue IVF, IV antibiotics  - Rest of care per primary team.     Belinda Garza DO  PGY1, General Surgery  11/04/24  7:17 AM  [phone]       
regurgitation.    Left Atrium: Not well visualized. Left atrium is mildly dilated. Left   atrial volume index is normal (16-34 mL/m2).    Image quality is technically difficult. Contrast used: Definity.   Technically difficult study, technically difficult study with poor   endocardial visualization, technically difficult study due to patient's   body habitus, limited Doppler study due to patient's condition,   technically difficult Doppler study, limited study due to patient's   ability to tolerate test and procedure performed with the patient in a   supine position.  CTA ABDOMEN PELVIS W WO CONTRAST  Narrative: CTA ABDOMEN PELVIS W WO CONTRAST    Indication: endoleak s/p evar 2023    Technique: Helical CT images of the abdomen and pelvis were acquired prior to  and after the administration of intravenous contrast media. Axial, coronal and  sagittal reformatted images were constructed from the raw data set.  Individualized dose optimization technique was used in order to meet ALARA  standards for radiation dose reduction.  In addition to vendor specific dose  reduction algorithms, the dose reduction techniques vary based on the specific  scanner utilized but frequently include automated exposure control, adjustment  of the mA and/or kV according to patient size, and use of iterative  reconstruction technique. 3-dimensional vascular reconstructions were provided  for review.    COMPARISON: 11/3/2024.    Findings: Vascular findings: There is an aortobiiliac stent graft. This extends  from just distal to the renal artery takeoff into the bilateral common iliac  arteries. The stent graft is patent.    There is dilation of the excluded aneurysmal sac to 5.1 x 5.8 cm. Small area of  hyperattenuation of the excluded sac is stable on pre and postcontrast images.  There is no abnormal contrast opacification on delayed venous phase imaging. No  discrete endoleak.    There is patency of the celiac axis, superior mesenteric 
(SINGLE AP VIEW)   Final Result      Nonobstructive bowel gas pattern.      Electronically signed by Missael Downey MD      CTA CHEST ABDOMEN PELVIS W CONTRAST   Final Result         1. Long segment wall thickening from the proximal transverse colon to the rectosigmoid colon could represent infectious or inflammatory colitis   2. Complex left renal masses, proximal unchanged in size. Surgical consultation recommended   3. There is an aortoiliac and a stent in place with a small blush which may represent an endoleak without evidence of interval change with an sac diameter.   4. Borderline lymph nodes in the chest. These may be reactive or neoplastic.           Electronically signed by Shade Sarah          Medical Decision Making:  The following items were considered in medical decision making:  Discussion of patient care with other providers  Reviewed clinical lab tests  Reviewed radiology tests  Reviewed other diagnostic tests/interventions    Eyal Roberson MD   [facility]       
SubCUTAneous 4x Daily AC & HS    nortriptyline  25 mg Oral Nightly    polyethylene glycol  17 g Oral QAM    senna  1 tablet Oral Nightly    torsemide  40 mg Oral QAM    sevelamer  800 mg Oral TID WC      Infusions:    sodium chloride 25 mL (11/05/24 0047)    dextrose       PRN Meds: albuterol, 2.5 mg, Q4H PRN  diphenhydrAMINE, 25 mg, Q4H PRN  methocarbamol, 500 mg, Q6H PRN  sodium chloride flush, 5-40 mL, PRN  sodium chloride, , PRN  ondansetron, 4 mg, Q8H PRN   Or  ondansetron, 4 mg, Q6H PRN  acetaminophen, 650 mg, Q6H PRN   Or  acetaminophen, 650 mg, Q6H PRN  glucose, 4 tablet, PRN  dextrose bolus, 125 mL, PRN   Or  dextrose bolus, 250 mL, PRN  glucagon (rDNA), 1 mg, PRN  dextrose, , Continuous PRN  HYDROmorphone, 0.5 mg, Q4H PRN  melatonin, 6 mg, Nightly PRN        Labs and Imaging   CTA ABDOMEN PELVIS W WO CONTRAST    Result Date: 11/5/2024  CTA ABDOMEN PELVIS W WO CONTRAST Indication: endoleak s/p evar 2023 Technique: Helical CT images of the abdomen and pelvis were acquired prior to and after the administration of intravenous contrast media. Axial, coronal and sagittal reformatted images were constructed from the raw data set. Individualized dose optimization technique was used in order to meet ALARA standards for radiation dose reduction.  In addition to vendor specific dose reduction algorithms, the dose reduction techniques vary based on the specific scanner utilized but frequently include automated exposure control, adjustment of the mA and/or kV according to patient size, and use of iterative reconstruction technique. 3-dimensional vascular reconstructions were provided for review. COMPARISON: 11/3/2024. Findings: Vascular findings: There is an aortobiiliac stent graft. This extends from just distal to the renal artery takeoff into the bilateral common iliac arteries. The stent graft is patent. There is dilation of the excluded aneurysmal sac to 5.1 x 5.8 cm. Small area of hyperattenuation of the excluded 
osseous process.     Patent aortobiiliac stent graft without evidence for endoleak. Complex left renal mass for which surgical consultation is recommended. Wall thickening of the transverse and descending colon with pericolonic inflammatory change, nonspecific, but may represent infectious or inflammatory colitis. Electronically signed by Epi Castro    XR ABDOMEN (KUB) (SINGLE AP VIEW)    Result Date: 11/4/2024  ABDOMEN ONE VIEW CLINICAL HISTORY: Acutely tender abdomen. Rule out bowel perforation. FINDINGS:  Supine AP view the abdomen was obtained. Evaluation for pneumoperitoneum is limited on supine radiograph. No bowel dilatation is identified. Stent graft is identified within the abdominal aorta. Degenerative changes are present within the lumbar spine.     Nonobstructive bowel gas pattern. Electronically signed by Missael Downey MD    CTA CHEST ABDOMEN PELVIS W CONTRAST    Result Date: 11/3/2024  EXAM: CTA CHEST ABDOMEN PELVIS W CONTRAST CLINICAL  INDICATION: abdominal pain; prior AAA w/ repair r/o, rectal bleeding aortoenteric fistula COMPARISON: 9/19/2023 TECHNIQUE: CT of the chest, abdomen and pelvis was performed with IV contrast according to the CT angiogram protocol with coronal and sagittal reformats.  MIP reformats Unless otherwise specified, incidental findings do not require dedicated imaging follow-up. This exam was performed according to our departmental dose-optimization program, which includes automated exposure control, adjustment of the mA and/or kV according to patient size and/or use of iterative reconstruction technique. CONTRAST: IV contrast per protocol. FINDINGS: CHEST: Aorta nonaneurysmal. No evidence of aortic dissection. The central pulmonary arteries appear patent. Heart size normal. Borderline prominent lymph nodes with paratracheal nodes, AP window nodes. No significant pleural effusions. Degenerative changes of spine. Lungs demonstrate dependent atelectasis. Abdomen/pelvis: 
large complex mixed attenuation partially calcified mass in the lower pole measuring 12.0 x 13.0 cm, proximally stable. There is a high attenuation interpole left renal lesion  measuring about 1.0 x 1.8 cm also approximately stable. No hydronephrosis. No evidence of bowel obstruction. There is long segment wall thickening from the proximal transverse colon through the rectosigmoid colon. No evidence of appendicitis.. No drainable ascites. Uterus is atrophic or absent. The bladder is underdistended. There is an aortoiliac and a stent in place with approximately stable in size. Small blush seen within the excluded aneurysm may represent a small endoleak, series 2 image 105. No evidence of active gastrointestinal bleeding. Bone windows demonstrate no acute changes.     1. Long segment wall thickening from the proximal transverse colon to the rectosigmoid colon could represent infectious or inflammatory colitis 2. Complex left renal masses, proximal unchanged in size. Surgical consultation recommended 3. There is an aortoiliac and a stent in place with a small blush which may represent an endoleak without evidence of interval change with an sac diameter. 4. Borderline lymph nodes in the chest. These may be reactive or neoplastic.  Electronically signed by Shade Sarah      CBC:   Recent Labs     11/03/24 1735 11/04/24 0236 11/05/24 0333 11/05/24 0924 11/06/24  0508   WBC 14.7*  --  15.9*  --  14.1*   HGB 11.4*   < > 10.3* 11.7* 10.2*     --  139  --  146    < > = values in this interval not displayed.     BMP:    Recent Labs     11/04/24 0236 11/05/24  0333 11/06/24  0508   * 133* 129*   K 4.5 4.9  4.9 3.9   CL 98* 99 96*   CO2 21 21 21   BUN 27* 35* 24*   CREATININE 8.1* 9.6* 7.6*   GLUCOSE 92 75 95     Hepatic:   Recent Labs     11/03/24  1735 11/05/24 0924   AST 67* 22   ALT 17 8*   BILITOT 0.4 0.3   ALKPHOS 102 117     Lipids:   Lab Results   Component Value Date/Time    CHOL 170 06/10/2019 12:12 
06/10/2019 12:12 PM    HDL 40 06/10/2019 12:12 PM    TRIG 132 06/10/2019 12:12 PM     Hemoglobin A1C:   Lab Results   Component Value Date/Time    LABA1C 4.8 11/04/2024 02:36 AM     TSH:   Lab Results   Component Value Date/Time    TSH 0.78 07/25/2020 10:59 AM     Troponin: No results found for: \"TROPONINT\"  Lactic Acid:   No results for input(s): \"LACTA\" in the last 72 hours.    BNP: No results for input(s): \"PROBNP\" in the last 72 hours.  UA:  Lab Results   Component Value Date/Time    NITRU Negative 11/04/2024 12:20 AM    COLORU Yellow 11/04/2024 12:20 AM    PHUR 7.0 11/04/2024 12:20 AM    PHUR 8.0 09/08/2023 08:15 AM    WBCUA 21-50 11/04/2024 12:20 AM    RBCUA 3-4 11/04/2024 12:20 AM    MUCUS 2+ 05/29/2021 06:00 AM    BACTERIA 4+ 11/04/2024 12:20 AM    CLARITYU Clear 11/04/2024 12:20 AM    LEUKOCYTESUR MODERATE 11/04/2024 12:20 AM    UROBILINOGEN 0.2 11/04/2024 12:20 AM    BILIRUBINUR Negative 11/04/2024 12:20 AM    BLOODU LARGE 11/04/2024 12:20 AM    GLUCOSEU Negative 11/04/2024 12:20 AM    KETUA Negative 11/04/2024 12:20 AM    AMORPHOUS 1+ 07/24/2020 11:24 PM     Urine Cultures:   Lab Results   Component Value Date/Time    LABURIN  11/04/2024 01:50 AM     >100,000 CFU/ml  CONTACT PRECAUTIONS INDICATED  Carbapenem antibiotics are the best option for infections caused  by ESBL producing organisms.  Other antibiotic classes are  likely to result in treatment failure, even for organisms  demonstrating in vitro susceptibility.       Blood Cultures:   Lab Results   Component Value Date/Time    BC No Growth after 4 days of incubation. 07/25/2021 08:54 PM     Lab Results   Component Value Date/Time    BLOODCULT2 No Growth after 4 days of incubation. 07/25/2021 08:54 PM     Organism:   Lab Results   Component Value Date/Time    ORG Escherichia coli ESBL 11/04/2024 01:50 AM         Electronically signed by Cheryl Kaur MD on 11/8/2024 at 7:28 AM  Comment: Please note this report has been produced using speech 
ESBL 11/04/2024 01:50 AM         Electronically signed by Cheryl Kaur MD on 11/7/2024 at 8:36 AM  Comment: Please note this report has been produced using speech recognition software and may contain errors related to that system including errors in grammar, punctuation, and spelling, as well as words and phrases that may be inappropriate. If there are any questions or concerns, please feel free to contact the dictating provider for clarification.   
software and may contain errors related to that system including errors in grammar, punctuation, and spelling, as well as words and phrases that may be inappropriate. If there are any questions or concerns, please feel free to contact the dictating provider for clarification.

## 2024-11-11 NOTE — DISCHARGE INSTR - COC
Continuity of Care Form    Patient Name: [place] Saleem   :  1955  MRN:  [id number]    Admit date:  11/3/2024  Discharge date:  24    Code Status Order: Full Code   Advance Directives:   Advance Care Flowsheet Documentation        Date/Time Healthcare Directive Type of Healthcare Directive Copy in Chart Healthcare Agent Appointed Healthcare Agent's Name Healthcare Agent's Phone Number    24 1429 No, patient does not have an advance directive for healthcare treatment  --  --  --  --  --                     Admitting Physician:  Andrade Cr MD  PCP: Kim Liu MD    Discharging Nurse: [facility] Unit/Room[facility] 4303/4303-01  Discharging Unit Phone Number: [phone]    Emergency Contact:   Extended Emergency Contact Information  Primary Emergency Contact: Dedra Loaiza  Home Phone: [phone]  Relation: Child  Secondary Emergency Contact: Mitzy Saleem  Home Phone: [phone]  Mobile Phone: [phone]  Relation: Child    Past Surgical History:  Past Surgical History:   Procedure Laterality Date    ABDOMINAL AORTIC ANEURYSM REPAIR, ENDOVASCULAR N/A 2023    ABDOMINAL AORTIC ANEURYSM REPAIR ENDOVASCULAR performed by Sofiya Alonzo MD at [facility] OR    APPENDECTOMY      CHOLECYSTECTOMY      COLONOSCOPY N/A 2024    COLONOSCOPY BIOPSY/ STOOL ASPIRATE performed by Laura Roberson MD at [facility] ENDOSCOPY    IR TUNNELED CATHETER PLACEMENT GREATER THAN 5 YEARS  2021    IR TUNNELED CATHETER PLACEMENT GREATER THAN 5 YEARS 2021 [facility] SPECIAL PROCEDURES    KIDNEY CYST REMOVAL         Immunization History:     There is no immunization history on file for this patient.    Active Problems:  Patient Active Problem List   Diagnosis Code    Gout M10.9    ACS (acute coronary syndrome) (HCC) I24.9    Acute renal failure (HCC) N17.9    ARF (acute renal failure) (HCC) N17.9    Essential hypertension I10    Anemia of chronic renal failure, stage 5 (HCC) N18.5, D63.1

## 2024-11-11 NOTE — CARE COORDINATION
Case Management Assessment            Discharge Note                    Date / Time of Note: 11/11/2024 11:18 AM                  Discharge Note Completed by: Dmitriy Moreno RN    Patient Name: Florida Saleem   YOB: 1955  Diagnosis: Rectal bleeding [K62.5]  Acute colitis [K52.9]   Date / Time: 11/3/2024  4:25 PM    Current PCP: Kim Liu MD  Clinic patient: No    Hospitalization in the last 30 days: No       Advance Directives:  Code Status: Full Code  [place] DNR form completed and on chart: Not Indicated    Financial:  Payor: MEDICAID [place] / Plan: [place] DEPT OF [place]OB / Product Type: *No Product type* /      Pharmacy:    [facility] [id number] [place], [place] - [address] [facility] [facility] - P [phone] - F [phone]  [address] [place] [address]  Phone: [phone] Fax: [phone]    [facility] [place], [place] - [address] - P [phone] - F [phone]  [address]  [facility] [address]  Phone: [phone] Fax: [phone]      Assistance purchasing medications?: Potential Assistance Purchasing Medications: No  Assistance provided by Case Management: None at this time    Does patient want to participate in local refill/ meds to beds program?:      Meds To Beds General Rules:  1. Can ONLY be done Monday- Friday between 8:30am-5pm  2. Prescription(s) must be in pharmacy by 3pm to be filled same day  3.Copy of patient's insurance/ prescription drug card and patient face sheet must be sent along with the prescription(s)  4. Cost of Rx cannot be added to hospital bill. If financial assistance is needed, please contact unit  or ;  or  CANNOT provide pharmacy voucher for patients co-pays  5. Patients can then  the prescription on their way out of the hospital at discharge, or pharmacy can deliver to the bedside if staff is available. (payment due at time of pick-up or delivery - cash,

## 2024-12-28 ENCOUNTER — TELEPHONE (OUTPATIENT)
Dept: PRIMARY CARE CLINIC | Age: 69
End: 2024-12-28

## 2025-01-07 ENCOUNTER — TELEPHONE (OUTPATIENT)
Dept: PRIMARY CARE CLINIC | Age: 70
End: 2025-01-07

## 2025-01-07 DIAGNOSIS — D63.1 ANEMIA OF CHRONIC RENAL FAILURE, STAGE 5 (HCC): ICD-10-CM

## 2025-01-07 DIAGNOSIS — N18.5 ANEMIA OF CHRONIC RENAL FAILURE, STAGE 5 (HCC): ICD-10-CM

## 2025-01-07 DIAGNOSIS — N18.5 CHRONIC KIDNEY DISEASE-MINERAL BONE DISORDER (CKD-MBD) WITH STAGE 5 CHRONIC KIDNEY DISEASE, ON CHRONIC DIALYSIS (HCC): ICD-10-CM

## 2025-01-07 DIAGNOSIS — Z99.2 CHRONIC KIDNEY DISEASE-MINERAL BONE DISORDER (CKD-MBD) WITH STAGE 5 CHRONIC KIDNEY DISEASE, ON CHRONIC DIALYSIS (HCC): ICD-10-CM

## 2025-01-07 DIAGNOSIS — M89.9 CHRONIC KIDNEY DISEASE-MINERAL BONE DISORDER (CKD-MBD) WITH STAGE 5 CHRONIC KIDNEY DISEASE, ON CHRONIC DIALYSIS (HCC): ICD-10-CM

## 2025-01-07 DIAGNOSIS — I15.9 SECONDARY HYPERTENSION: ICD-10-CM

## 2025-01-07 DIAGNOSIS — N18.6 ESRD (END STAGE RENAL DISEASE) (HCC): Primary | ICD-10-CM

## 2025-01-07 DIAGNOSIS — E83.9 CHRONIC KIDNEY DISEASE-MINERAL BONE DISORDER (CKD-MBD) WITH STAGE 5 CHRONIC KIDNEY DISEASE, ON CHRONIC DIALYSIS (HCC): ICD-10-CM

## 2025-01-07 NOTE — TELEPHONE ENCOUNTER
Nephrology  : dialysis note     Pop Loera,  [facility]        History Obtained From:  Patient     1/7/25  comprehensive round   ===================================  Assessment and plan   Overall plan:  Work on better UF  ENT referral given     #  ESRD on HD   Prescription: see attached forms (scanned)   Adequacy : Kt/v : better   Nurse ramesh call SNF to address transportation issues      # Access : RIJ CVC   Evidence of malfunction:  No     # Volume status:   HTN: BP controlled? yes  Edema/ fluid overload?  Yes - better , close to DW   Medications changes:  No   Monitor   Low compliance with low sodium diet     # Metabolic :  Acidosis  No   Potassium controlled?  yes    # Mineral bone disease  S/p parathyroidectomy 8/24   Phosphor controlled?  8.1  Binder: Ca-carb 1 gr tid    Calcium controlled?  Yes   PTH controlled ?  Yes   Medications: see attached note  Meds changes :  No     # Anemia of CKD:  Hb :10.1  Iron sat% wnk   Ferritin -controlled   ZULEMA:Mircera   Iron supp-Venofer      # Nutrition:  Albumin- 3.9  Evidence of malnutrition:  none     # Infection  Evidence of infetion?  UA and UC devon be  asked to be drawn at the SNF by our HD nurse     # Referral to kidney transplant  Referral given?  No   Barriers for transplant?  None compliance      Ear;pain- referral to Dr Quiroz   ===================================  History of Present Illness:    This is a 69 y.o. female with ESRD on hemodialysis   S/p parathyroidectomy-   No complains today except nausea- got Zofran     PMHx include:   Hx of HTN Yes   ESRD on HD  Anemia of CKD  MBD  AAA    Labs: see scanned attached documents   Pertinent remarkable labs:       Pt denies CP/palpitations/abdominal pain/N/V.   Pt denies fever/ chills  Pt denies dysuria or hematuria   Pt report constipatio and 2 days itch which responded to benadryl     Current Medications:    Current Outpatient Medications   Medication Sig Dispense Refill

## 2025-03-06 RX ORDER — CALCITRIOL 0.5 UG/1
1 CAPSULE, LIQUID FILLED ORAL 2 TIMES DAILY
COMMUNITY

## 2025-04-16 LAB
ALBUMIN SERPL-MCNC: 4 G/DL (ref 3.4–5)
ALBUMIN/GLOB SERPL: 1 {RATIO} (ref 1.1–2.2)
ALP SERPL-CCNC: 95 U/L (ref 40–129)
ALT SERPL-CCNC: ABNORMAL U/L (ref 10–40)
ANION GAP SERPL CALCULATED.3IONS-SCNC: 16 MMOL/L (ref 3–16)
AST SERPL-CCNC: 99 U/L (ref 15–37)
BASOPHILS # BLD: 0.1 K/UL (ref 0–0.2)
BASOPHILS NFR BLD: 0.7 %
BILIRUB SERPL-MCNC: 0.3 MG/DL (ref 0–1)
BUN SERPL-MCNC: 27 MG/DL (ref 7–20)
CALCIUM SERPL-MCNC: 8.3 MG/DL (ref 8.3–10.6)
CHLORIDE SERPL-SCNC: 97 MMOL/L (ref 99–110)
CO2 SERPL-SCNC: 20 MMOL/L (ref 21–32)
CREAT SERPL-MCNC: 7.8 MG/DL (ref 0.6–1.2)
DEPRECATED RDW RBC AUTO: 18.6 % (ref 12.4–15.4)
EOSINOPHIL # BLD: 1.3 K/UL (ref 0–0.6)
EOSINOPHIL NFR BLD: 11.4 %
GFR SERPLBLD CREATININE-BSD FMLA CKD-EPI: 5 ML/MIN/{1.73_M2}
GLUCOSE SERPL-MCNC: 116 MG/DL (ref 70–99)
HCT VFR BLD AUTO: 28.8 % (ref 36–48)
HGB BLD-MCNC: 9.6 G/DL (ref 12–16)
LIPASE SERPL-CCNC: 28 U/L (ref 13–60)
LYMPHOCYTES # BLD: 2.8 K/UL (ref 1–5.1)
LYMPHOCYTES NFR BLD: 25.2 %
MCH RBC QN AUTO: 31.4 PG (ref 26–34)
MCHC RBC AUTO-ENTMCNC: 33.1 G/DL (ref 31–36)
MCV RBC AUTO: 94.7 FL (ref 80–100)
MONOCYTES # BLD: 0.8 K/UL (ref 0–1.3)
MONOCYTES NFR BLD: 7.5 %
NEUTROPHILS # BLD: 6.2 K/UL (ref 1.7–7.7)
NEUTROPHILS NFR BLD: 55.2 %
PLATELET # BLD AUTO: 170 K/UL (ref 135–450)
PMV BLD AUTO: 6.9 FL (ref 5–10.5)
POTASSIUM SERPL-SCNC: ABNORMAL MMOL/L (ref 3.5–5.1)
PROT SERPL-MCNC: 8.1 G/DL (ref 6.4–8.2)
RBC # BLD AUTO: 3.04 M/UL (ref 4–5.2)
SODIUM SERPL-SCNC: 133 MMOL/L (ref 136–145)
WBC # BLD AUTO: 11.2 K/UL (ref 4–11)

## 2025-04-16 PROCEDURE — 85025 COMPLETE CBC W/AUTO DIFF WBC: CPT

## 2025-04-16 PROCEDURE — 36415 COLL VENOUS BLD VENIPUNCTURE: CPT

## 2025-04-16 PROCEDURE — 80053 COMPREHEN METABOLIC PANEL: CPT

## 2025-04-16 PROCEDURE — 83690 ASSAY OF LIPASE: CPT

## 2025-04-16 PROCEDURE — 99284 EMERGENCY DEPT VISIT MOD MDM: CPT

## 2025-04-16 ASSESSMENT — PAIN SCALES - GENERAL: PAINLEVEL_OUTOF10: 8

## 2025-04-16 ASSESSMENT — PAIN DESCRIPTION - DESCRIPTORS: DESCRIPTORS: SHARP;ACHING;STABBING

## 2025-04-16 ASSESSMENT — LIFESTYLE VARIABLES
HOW OFTEN DO YOU HAVE A DRINK CONTAINING ALCOHOL: NEVER
HOW MANY STANDARD DRINKS CONTAINING ALCOHOL DO YOU HAVE ON A TYPICAL DAY: PATIENT DOES NOT DRINK

## 2025-04-16 ASSESSMENT — PAIN DESCRIPTION - ORIENTATION: ORIENTATION: LEFT;UPPER

## 2025-04-16 ASSESSMENT — PAIN - FUNCTIONAL ASSESSMENT: PAIN_FUNCTIONAL_ASSESSMENT: 0-10

## 2025-04-16 ASSESSMENT — PAIN DESCRIPTION - LOCATION: LOCATION: ABDOMEN

## 2025-04-17 ENCOUNTER — APPOINTMENT (OUTPATIENT)
Dept: GENERAL RADIOLOGY | Age: 70
End: 2025-04-17
Payer: MEDICAID

## 2025-04-17 ENCOUNTER — HOSPITAL ENCOUNTER (EMERGENCY)
Age: 70
Discharge: HOME | End: 2025-04-17
Attending: EMERGENCY MEDICINE
Payer: MEDICAID

## 2025-04-17 VITALS
HEART RATE: 69 BPM | TEMPERATURE: 98.6 F | RESPIRATION RATE: 18 BRPM | SYSTOLIC BLOOD PRESSURE: 156 MMHG | BODY MASS INDEX: 43.23 KG/M2 | HEIGHT: 66 IN | DIASTOLIC BLOOD PRESSURE: 84 MMHG | OXYGEN SATURATION: 96 %

## 2025-04-17 DIAGNOSIS — R06.00 DYSPNEA AND RESPIRATORY ABNORMALITIES: ICD-10-CM

## 2025-04-17 DIAGNOSIS — R10.84 GENERALIZED ABDOMINAL PAIN: Primary | ICD-10-CM

## 2025-04-17 DIAGNOSIS — R06.89 DYSPNEA AND RESPIRATORY ABNORMALITIES: ICD-10-CM

## 2025-04-17 LAB
BACTERIA URNS QL MICRO: ABNORMAL /HPF
BILIRUB UR QL STRIP.AUTO: NEGATIVE
CLARITY UR: CLEAR
COLOR UR: YELLOW
FLUAV RNA RESP QL NAA+PROBE: NOT DETECTED
FLUBV RNA RESP QL NAA+PROBE: NOT DETECTED
GLUCOSE UR STRIP.AUTO-MCNC: NEGATIVE MG/DL
HGB UR QL STRIP.AUTO: ABNORMAL
KETONES UR STRIP.AUTO-MCNC: NEGATIVE MG/DL
LEUKOCYTE ESTERASE UR QL STRIP.AUTO: ABNORMAL
NITRITE UR QL STRIP.AUTO: NEGATIVE
PH UR STRIP.AUTO: 6.5 [PH] (ref 5–8)
POTASSIUM SERPL-SCNC: 4 MMOL/L (ref 3.5–5.1)
PROT UR STRIP.AUTO-MCNC: 100 MG/DL
RBC #/AREA URNS HPF: ABNORMAL /HPF (ref 0–4)
SARS-COV-2 RNA RESP QL NAA+PROBE: NOT DETECTED
SP GR UR STRIP.AUTO: 1.01 (ref 1–1.03)
UA COMPLETE W REFLEX CULTURE PNL UR: YES
UA DIPSTICK W REFLEX MICRO PNL UR: YES
URN SPEC COLLECT METH UR: ABNORMAL
UROBILINOGEN UR STRIP-ACNC: 0.2 E.U./DL
WBC #/AREA URNS HPF: ABNORMAL /HPF (ref 0–5)

## 2025-04-17 PROCEDURE — 71046 X-RAY EXAM CHEST 2 VIEWS: CPT

## 2025-04-17 PROCEDURE — 87086 URINE CULTURE/COLONY COUNT: CPT

## 2025-04-17 PROCEDURE — 87186 SC STD MICRODIL/AGAR DIL: CPT

## 2025-04-17 PROCEDURE — 6370000000 HC RX 637 (ALT 250 FOR IP)

## 2025-04-17 PROCEDURE — 84132 ASSAY OF SERUM POTASSIUM: CPT

## 2025-04-17 PROCEDURE — 94640 AIRWAY INHALATION TREATMENT: CPT

## 2025-04-17 PROCEDURE — 36415 COLL VENOUS BLD VENIPUNCTURE: CPT

## 2025-04-17 PROCEDURE — 6370000000 HC RX 637 (ALT 250 FOR IP): Performed by: EMERGENCY MEDICINE

## 2025-04-17 PROCEDURE — 6360000002 HC RX W HCPCS: Performed by: EMERGENCY MEDICINE

## 2025-04-17 PROCEDURE — 87077 CULTURE AEROBIC IDENTIFY: CPT

## 2025-04-17 PROCEDURE — 81001 URINALYSIS AUTO W/SCOPE: CPT

## 2025-04-17 PROCEDURE — 87636 SARSCOV2 & INF A&B AMP PRB: CPT

## 2025-04-17 RX ORDER — PREDNISONE 20 MG/1
40 TABLET ORAL ONCE
Status: COMPLETED | OUTPATIENT
Start: 2025-04-17 | End: 2025-04-17

## 2025-04-17 RX ORDER — ALBUTEROL SULFATE 0.83 MG/ML
2.5 SOLUTION RESPIRATORY (INHALATION) ONCE
Status: COMPLETED | OUTPATIENT
Start: 2025-04-17 | End: 2025-04-17

## 2025-04-17 RX ORDER — IPRATROPIUM BROMIDE AND ALBUTEROL SULFATE 2.5; .5 MG/3ML; MG/3ML
1 SOLUTION RESPIRATORY (INHALATION) ONCE
Status: COMPLETED | OUTPATIENT
Start: 2025-04-17 | End: 2025-04-17

## 2025-04-17 RX ORDER — OXYCODONE HYDROCHLORIDE 5 MG/1
5 TABLET ORAL ONCE
Refills: 0 | Status: COMPLETED | OUTPATIENT
Start: 2025-04-17 | End: 2025-04-17

## 2025-04-17 RX ADMIN — IPRATROPIUM BROMIDE AND ALBUTEROL SULFATE 1 DOSE: .5; 2.5 SOLUTION RESPIRATORY (INHALATION) at 02:54

## 2025-04-17 RX ADMIN — OXYCODONE 5 MG: 5 TABLET ORAL at 10:33

## 2025-04-17 RX ADMIN — PREDNISONE 40 MG: 20 TABLET ORAL at 04:21

## 2025-04-17 RX ADMIN — ALBUTEROL SULFATE 2.5 MG: 2.5 SOLUTION RESPIRATORY (INHALATION) at 04:19

## 2025-04-17 RX ADMIN — OXYCODONE HYDROCHLORIDE 5 MG: 5 TABLET ORAL at 02:47

## 2025-04-17 RX ADMIN — IPRATROPIUM BROMIDE AND ALBUTEROL SULFATE 1 DOSE: .5; 2.5 SOLUTION RESPIRATORY (INHALATION) at 02:05

## 2025-04-17 ASSESSMENT — PAIN SCALES - GENERAL
PAINLEVEL_OUTOF10: 8
PAINLEVEL_OUTOF10: 10
PAINLEVEL_OUTOF10: 6

## 2025-04-17 ASSESSMENT — PAIN DESCRIPTION - LOCATION: LOCATION: ABDOMEN

## 2025-04-17 ASSESSMENT — PAIN DESCRIPTION - ORIENTATION: ORIENTATION: LEFT

## 2025-04-17 NOTE — ED PROVIDER NOTES
THE [facility]  EMERGENCY DEPARTMENT ENCOUNTER          EM RESIDENT NOTE       Date of evaluation: 4/16/2025    Chief Complaint     Abdominal Pain (Patient came in for abdominal pain feels like she has a knot on left upper side of her abdomen and stated that there is something in her room in nursing home that's triggering her asthma and makes her cough and makes the belly pain worse )      History of Present Illness     Florida Saleem is a 69 y.o. female with history of ESRD on HD (last dialysis yesterday), HTN, T2DM, AAA s/p repair, COPD who presents emergency department for shortness of breath and left-sided abdominal pain.  Per patient she has had worsening shortness of breath for about the past 1 week, she attributes this to some fumes or smoke in her room at her nursing home which are triggering her asthma/COPD.  Patient denies any vomiting or diarrhea or fevers.  Patient notes that she has also had worsening left-sided abdominal pain which she has been told before is a cyst however the pain is worse because she is coughing more and this is causing the pain to flareup.  Patient denies chest pain.    MEDICAL DECISION MAKING / ASSESSMENT / PLAN     INITIAL VITALS: BP: (!) 178/91, Temp: 98.6 °F (37 °C), Pulse: 70, Respirations: 16, SpO2: 98 %    Florida Saleem is a 69 y.o. female well-appearing on exam, presenting for wheezing and  left-sided abdominal pain.  Patient audibly wheezing on exam with decreased air movement bilaterally.  Patient ordered DuoNeb.  Given no history of fevers, and described trigger, did not order x-ray, low suspicion for pneumonia..  CT from 3/24/2025 as follow \". Multiple bilateral renal cysts with a dominant stable complex large cyst in the left kidney as detailed. No new renal lesion. Clinical correlation and continued follow-up is suggested. Additionally, this can be evaluated with MRI.\".  Patient's pain likely secondary to irritation with coughing.  Low suspicion for SBO

## 2025-04-17 NOTE — ED TRIAGE NOTES
Patient came in for abdominal pain feels like she has a knot on left upper side of her abdomen and stated that there is something in her room in nursing home that's triggering her asthma and makes her cough and makes the belly pain worse

## 2025-04-17 NOTE — ED PROVIDER NOTES
THE [facility]  EMERGENCY DEPARTMENT ENCOUNTER          ATTENDING PHYSICIAN NOTE       Date of evaluation: 4/16/2025    ADDENDUM:      Care of this patient was assumed from Dr Brian.  The patient was seen for Abdominal Pain (Patient came in for abdominal pain feels like she has a knot on left upper side of her abdomen and stated that there is something in her room in nursing home that's triggering her asthma and makes her cough and makes the belly pain worse )  .  The patient's initial evaluation and plan have been discussed with the prior provider who initially evaluated the patient.  Nursing Notes, Past Medical Hx, Past Surgical Hx, Social Hx, Allergies, and Family Hx were all reviewed.    ASSESSMENT / PLAN  (MEDICAL DECISION MAKING)     Florida Saleem is a 69 y.o. female who presented to the Emergency Department with concerns for abdominal pain.  The patient has a history of some chronic abdominal pain resulted from a renal cyst.  She has a history of end-stage renal disease on dialysis would be due for dialysis today.  She was turned over to me awaiting the results of her repeat potassium level to be drawn this morning as multiple potassium levels were hemolyzed overnight.  Her repeat potassium was 4.  The patient has been stable during her period of observation here in the emergency department ultimately was felt safe for discharge.  Dr. Brian had attempted to contact her dialysis center who said that she could do dialysis on her today but there was transportation issues with getting the patient back from dialysis.  Patient states that she does not need to have dialysis every single day and she does not want to go to dialysis because she does not have all of her clothing items.  At this point time we will discharge the patient back to her care facility with instructions for them to attempt to arrange dialysis tomorrow or at the earliest point possible.  The patient does not have any indication for

## 2025-04-17 NOTE — ED PROVIDER NOTES
ED Attending Attestation Note     Date of evaluation: 4/16/2025    This patient was seen by the resident.  I have seen and examined the patient, agree with the workup, evaluation, management and diagnosis. The care plan has been discussed.  I have reviewed the ECG and concur with the resident's interpretation.      Briefly, Florida Saleem is a 69 y.o. female with a PMH inclusive of ESRD on dialysis, last dialysis yesterday who presents for evaluation of SOB and left sided abd pain. Feels like something in her facility is making her cough worse. Has left abd pain that she attributes to her cyst.     Notable exam findings include expiratory wheezing with fair aeration throughout (examined after initial nebs), left hemiabdominal tenderness.    Assessment/ Medical Decision Making:     In talking with patient, the abdominal pain that she is having is chronic for her and not change in location or character since recent CT scan.  Do believe her worsening is secondary to cough which I believe is related to secondhand tobacco exposure at her facility with her underlying history of COPD.  Patient given nebs, pain medicine here with improvement in symptoms. History and exam are not suggestive of ACS, PE, dissection.  Chest x-ray without pneumonia.  COVID and flu are negative.    Patient is due for dialysis this morning.  She was offered but declined transport directly there stating she would need to reschedule because she needs to change clothes and to rest.  I attempted to arrange for dialysis this afternoon but while her dialysis center cannot accommodate, transportation cannot.  With this, will continue to get accurate potassium value here to evaluate for her need for urgent dialysis.           Jasmin Brian MD  04/17/25 5144

## 2025-04-17 NOTE — RT PROTOCOL NOTE
RT Inhaler-Nebulizer Bronchodilator Protocol Note    There is a bronchodilator order in the chart from a provider indicating to follow the RT Bronchodilator Protocol and there is an “Initiate RT Inhaler-Nebulizer Bronchodilator Protocol” order as well (see protocol at bottom of note).    CXR Findings:  No results found.    The findings from the last RT Protocol Assessment were as follows:   History Pulmonary Disease: Chronic pulmonary disease  Respiratory Pattern: Mild dyspnea at rest, irregular pattern, or RR 21-25 bpm  Breath Sounds: Inspiratory and expiratory or bilateral wheezing and/or rhonchi  Cough: Strong, productive  Indication for Bronchodilator Therapy: On home bronchodilators  Bronchodilator Assessment Score: 13    Aerosolized bronchodilator medication orders have been revised according to the RT Inhaler-Nebulizer Bronchodilator Protocol below.    Respiratory Therapist to perform RT Therapy Protocol Assessment initially then follow the protocol.  Repeat RT Therapy Protocol Assessment PRN for score 0-3 or on second treatment, BID, and PRN for scores above 3.    No Indications - adjust the frequency to every 6 hours PRN wheezing or bronchospasm, if no treatments needed after 48 hours then discontinue using Per Protocol order mode.     If indication present, adjust the RT bronchodilator orders based on the Bronchodilator Assessment Score as indicated below.  Use Inhaler orders unless patient has one or more of the following: on home nebulizer, not able to hold breath for 10 seconds, is not alert and oriented, cannot activate and use MDI correctly, or respiratory rate 25 breaths per minute or more, then use the equivalent nebulizer order(s) with same Frequency and PRN reasons based on the score.  If a patient is on this medication at home then do not decrease Frequency below that used at home.    0-3 - enter or revise RT bronchodilator order(s) to equivalent RT Bronchodilator order with Frequency of every 4

## 2025-04-17 NOTE — ED NOTES
This RN attempted to straight stick for repeat potassium level. Patient declining any more lab draws. Dr. Brian informed     Natacha De La Garza, RN  04/17/25 0531

## 2025-04-19 LAB
BACTERIA UR CULT: ABNORMAL
BACTERIA UR CULT: ABNORMAL
ORGANISM: ABNORMAL

## 2025-04-21 NOTE — ED NOTES
[facility]   Emergency Department Culture Follow-Up       Florida Saleem (CSN: [id number]) was seen and evaluated at [facility] Emergency Department on 4/16/25 by Dr. Pyle, Dr. Millard, Dr. Goddard as well as Dr. Brian.    A urine culture was obtained at time of encounter and is now positive. Urine culture is growing  >100,000 CFU/mL ESBL E. coli. Sensitivity results: Carbapenems are drug of choice for ESBL producing organisms; S-ertapenem     Results       Procedure Component Value Units Date/Time    Culture, Urine [[id number]]  (Abnormal)  (Susceptibility) Collected: 04/17/25 0605    Order Status: Completed Specimen: Urine, clean catch Updated: 04/19/25 0435     Urine Culture, Routine <10,000 CFU/ml mixed skin/urogenital betsy. No further workup     Organism Escherichia coli ESBL     Urine Culture, Routine --     >100,000 CFU/ml  CONTACT PRECAUTIONS INDICATED  Carbapenem antibiotics are the best option for infections caused  by ESBL producing organisms.  Other antibiotic classes are  likely to result in treatment failure, even for organisms  demonstrating in vitro susceptibility.      Narrative:      ORDER#: [id number]                          ORDERED BY: TATUM GODDARD  SOURCE: Urine Clean Catch                  COLLECTED:  04/17/25 06:05  ANTIBIOTICS AT PACO.:                      RECEIVED :  04/17/25 15:20    Susceptibility        Escherichia coli ESBL      BACTERIAL SUSCEPTIBILITY PANEL BY JULIA      ampicillin >=32 mcg/mL Resistant      ampicillin-sulbactam 4 mcg/mL Sensitive      ceFAZolin >=64 mcg/mL Resistant      cefepime 16 mcg/mL Resistant      cefTRIAXone >=64 mcg/mL Resistant      ciprofloxacin >=4 mcg/mL Resistant      ertapenem <=0.12 mcg/mL Sensitive      gentamicin <=1 mcg/mL Sensitive      levofloxacin >=8 mcg/mL Resistant      nitrofurantoin <=16 mcg/mL Sensitive      trimethoprim-sulfamethoxazole <=20 mcg/mL Sensitive

## 2025-05-02 ENCOUNTER — APPOINTMENT (OUTPATIENT)
Dept: GENERAL RADIOLOGY | Age: 70
End: 2025-05-02
Payer: MEDICAID

## 2025-05-02 ENCOUNTER — HOSPITAL ENCOUNTER (EMERGENCY)
Age: 70
Discharge: HOME | End: 2025-05-02
Attending: STUDENT IN AN ORGANIZED HEALTH CARE EDUCATION/TRAINING PROGRAM
Payer: MEDICAID

## 2025-05-02 VITALS
TEMPERATURE: 98.2 F | HEIGHT: 66 IN | SYSTOLIC BLOOD PRESSURE: 157 MMHG | BODY MASS INDEX: 37.01 KG/M2 | DIASTOLIC BLOOD PRESSURE: 75 MMHG | OXYGEN SATURATION: 98 % | WEIGHT: 230.3 LBS | HEART RATE: 68 BPM | RESPIRATION RATE: 17 BRPM

## 2025-05-02 DIAGNOSIS — N30.00 ACUTE CYSTITIS WITHOUT HEMATURIA: Primary | ICD-10-CM

## 2025-05-02 LAB
ALBUMIN SERPL-MCNC: 4 G/DL (ref 3.4–5)
ALBUMIN/GLOB SERPL: 1.1 {RATIO} (ref 1.1–2.2)
ALP SERPL-CCNC: 96 U/L (ref 40–129)
ALT SERPL-CCNC: 61 U/L (ref 10–40)
ANION GAP SERPL CALCULATED.3IONS-SCNC: 13 MMOL/L (ref 3–16)
AST SERPL-CCNC: 41 U/L (ref 15–37)
BACTERIA URNS QL MICRO: ABNORMAL /HPF
BASOPHILS # BLD: 0 K/UL (ref 0–0.2)
BASOPHILS NFR BLD: 0.3 %
BILIRUB SERPL-MCNC: 0.3 MG/DL (ref 0–1)
BILIRUB UR QL STRIP.AUTO: NEGATIVE
BUN SERPL-MCNC: 21 MG/DL (ref 7–20)
CALCIUM SERPL-MCNC: 8.6 MG/DL (ref 8.3–10.6)
CHLORIDE SERPL-SCNC: 98 MMOL/L (ref 99–110)
CLARITY UR: CLEAR
CO2 SERPL-SCNC: 23 MMOL/L (ref 21–32)
COLOR UR: YELLOW
CREAT SERPL-MCNC: 7.3 MG/DL (ref 0.6–1.2)
DEPRECATED RDW RBC AUTO: 21.4 % (ref 12.4–15.4)
EKG ATRIAL RATE: 61 BPM
EKG DIAGNOSIS: NORMAL
EKG P AXIS: 15 DEGREES
EKG P-R INTERVAL: 214 MS
EKG Q-T INTERVAL: 506 MS
EKG QRS DURATION: 94 MS
EKG QTC CALCULATION (BAZETT): 509 MS
EKG R AXIS: 25 DEGREES
EKG T AXIS: 76 DEGREES
EKG VENTRICULAR RATE: 61 BPM
EOSINOPHIL # BLD: 1.6 K/UL (ref 0–0.6)
EOSINOPHIL NFR BLD: 13.2 %
EPI CELLS #/AREA URNS HPF: ABNORMAL /HPF (ref 0–5)
FLUAV RNA RESP QL NAA+PROBE: NOT DETECTED
FLUBV RNA RESP QL NAA+PROBE: NOT DETECTED
GFR SERPLBLD CREATININE-BSD FMLA CKD-EPI: 6 ML/MIN/{1.73_M2}
GLUCOSE SERPL-MCNC: 94 MG/DL (ref 70–99)
GLUCOSE UR STRIP.AUTO-MCNC: NEGATIVE MG/DL
HCT VFR BLD AUTO: 29.7 % (ref 36–48)
HGB BLD-MCNC: 10.1 G/DL (ref 12–16)
HGB UR QL STRIP.AUTO: ABNORMAL
KETONES UR STRIP.AUTO-MCNC: NEGATIVE MG/DL
LEUKOCYTE ESTERASE UR QL STRIP.AUTO: ABNORMAL
LIPASE SERPL-CCNC: 29 U/L (ref 13–60)
LYMPHOCYTES # BLD: 1.7 K/UL (ref 1–5.1)
LYMPHOCYTES NFR BLD: 14.1 %
MCH RBC QN AUTO: 31.5 PG (ref 26–34)
MCHC RBC AUTO-ENTMCNC: 33.9 G/DL (ref 31–36)
MCV RBC AUTO: 93 FL (ref 80–100)
MONOCYTES # BLD: 0.9 K/UL (ref 0–1.3)
MONOCYTES NFR BLD: 7.4 %
NEUTROPHILS # BLD: 7.8 K/UL (ref 1.7–7.7)
NEUTROPHILS NFR BLD: 65 %
NITRITE UR QL STRIP.AUTO: NEGATIVE
NT-PROBNP SERPL-MCNC: 8858 PG/ML (ref 0–124)
PH UR STRIP.AUTO: 6.5 [PH] (ref 5–8)
PLATELET # BLD AUTO: 197 K/UL (ref 135–450)
PMV BLD AUTO: 6.7 FL (ref 5–10.5)
POTASSIUM SERPL-SCNC: 3.7 MMOL/L (ref 3.5–5.1)
PROT SERPL-MCNC: 7.8 G/DL (ref 6.4–8.2)
PROT UR STRIP.AUTO-MCNC: 100 MG/DL
RBC # BLD AUTO: 3.2 M/UL (ref 4–5.2)
RBC #/AREA URNS HPF: ABNORMAL /HPF (ref 0–4)
RENAL EPI CELLS #/AREA UR COMP ASSIST: ABNORMAL /HPF (ref 0–1)
SARS-COV-2 RNA RESP QL NAA+PROBE: NOT DETECTED
SODIUM SERPL-SCNC: 134 MMOL/L (ref 136–145)
SP GR UR STRIP.AUTO: 1.01 (ref 1–1.03)
TROPONIN, HIGH SENSITIVITY: 60 NG/L (ref 0–14)
TROPONIN, HIGH SENSITIVITY: 62 NG/L (ref 0–14)
UA COMPLETE W REFLEX CULTURE PNL UR: YES
UA DIPSTICK W REFLEX MICRO PNL UR: YES
URN SPEC COLLECT METH UR: ABNORMAL
UROBILINOGEN UR STRIP-ACNC: 0.2 E.U./DL
WBC # BLD AUTO: 12 K/UL (ref 4–11)
WBC #/AREA URNS HPF: ABNORMAL /HPF (ref 0–5)

## 2025-05-02 PROCEDURE — 87636 SARSCOV2 & INF A&B AMP PRB: CPT

## 2025-05-02 PROCEDURE — 6370000000 HC RX 637 (ALT 250 FOR IP)

## 2025-05-02 PROCEDURE — 87086 URINE CULTURE/COLONY COUNT: CPT

## 2025-05-02 PROCEDURE — 81001 URINALYSIS AUTO W/SCOPE: CPT

## 2025-05-02 PROCEDURE — 85025 COMPLETE CBC W/AUTO DIFF WBC: CPT

## 2025-05-02 PROCEDURE — 87186 SC STD MICRODIL/AGAR DIL: CPT

## 2025-05-02 PROCEDURE — 99285 EMERGENCY DEPT VISIT HI MDM: CPT

## 2025-05-02 PROCEDURE — 93005 ELECTROCARDIOGRAM TRACING: CPT

## 2025-05-02 PROCEDURE — 84484 ASSAY OF TROPONIN QUANT: CPT

## 2025-05-02 PROCEDURE — 71045 X-RAY EXAM CHEST 1 VIEW: CPT

## 2025-05-02 PROCEDURE — 83690 ASSAY OF LIPASE: CPT

## 2025-05-02 PROCEDURE — 80053 COMPREHEN METABOLIC PANEL: CPT

## 2025-05-02 PROCEDURE — 83880 ASSAY OF NATRIURETIC PEPTIDE: CPT

## 2025-05-02 PROCEDURE — 87077 CULTURE AEROBIC IDENTIFY: CPT

## 2025-05-02 RX ORDER — SULFAMETHOXAZOLE AND TRIMETHOPRIM 800; 160 MG/1; MG/1
1 TABLET ORAL ONCE
Status: COMPLETED | OUTPATIENT
Start: 2025-05-02 | End: 2025-05-02

## 2025-05-02 RX ORDER — SULFAMETHOXAZOLE AND TRIMETHOPRIM 400; 80 MG/1; MG/1
1 TABLET ORAL DAILY
Qty: 7 TABLET | Refills: 0 | Status: SHIPPED | OUTPATIENT
Start: 2025-05-02 | End: 2025-05-09

## 2025-05-02 RX ADMIN — SULFAMETHOXAZOLE AND TRIMETHOPRIM 1 TABLET: 800; 160 TABLET ORAL at 19:53

## 2025-05-02 ASSESSMENT — PAIN DESCRIPTION - DESCRIPTORS: DESCRIPTORS: ACHING

## 2025-05-02 ASSESSMENT — PAIN - FUNCTIONAL ASSESSMENT: PAIN_FUNCTIONAL_ASSESSMENT: 0-10

## 2025-05-02 ASSESSMENT — PAIN SCALES - GENERAL: PAINLEVEL_OUTOF10: 9

## 2025-05-02 ASSESSMENT — PAIN DESCRIPTION - LOCATION: LOCATION: CHEST;BACK

## 2025-05-02 ASSESSMENT — PAIN DESCRIPTION - ORIENTATION: ORIENTATION: MID

## 2025-05-02 ASSESSMENT — PAIN DESCRIPTION - PAIN TYPE: TYPE: ACUTE PAIN

## 2025-05-02 NOTE — ED PROVIDER NOTES
transaminitis though also nonconcerning.  BNP significantly elevated at 8800 although I believe this is attributable to her ESRD and chronically volume overloaded status.  Initial troponin elevated as well at 62 with negative delta to 60.  I do believe her ESRD is attributable to both her BNP and troponin elevation as she is not complaining of chest pain, is not hypoxic or tachycardic to suggest underlying PE.  Her urinalysis did eventually result positive for infection with 4+ bacteria.  Did elect to treat based off of prior culture data demonstrating susceptibility to Bactrim but resistant to many different antibiotics.  Patient is allergic to penicillins thus Augmentin not an option.  She was provided a renally adjusted dose of Bactrim.  Patient will be discharged with outpatient single dose strength Bactrim for UTI coverage.    Is this patient to be included in the SEP-1 core measure? No Exclusion criteria - the patient is NOT to be included for SEP-1 Core Measure due to: Infection is not suspected    Medical Decision Making  Amount and/or Complexity of Data Reviewed  Labs: ordered.  Radiology: ordered.  ECG/medicine tests: ordered.    Risk  Prescription drug management.        This patient was also evaluated by the attending physician. All care plans werediscussed and agreed upon.    Clinical Impression     1. Acute cystitis without hematuria        Disposition     PATIENT REFERRED TO:  No follow-up provider specified.    DISCHARGE MEDICATIONS:  New Prescriptions    SULFAMETHOXAZOLE-TRIMETHOPRIM (BACTRIM) 400-80 MG PER TABLET    Take 1 tablet by mouth daily for 7 days       DISPOSITION Decision To Discharge 05/02/2025 07:15:20 PM   DISPOSITION CONDITION Stable             Diagnostic Results and Other Data     RADIOLOGY:  XR CHEST PORTABLE   Final Result      Clear lungs.      Cardiomegaly.      Right jugular dialysis catheter without change.      Scoliosis.      Electronically signed by Hralan Leon     nursing note reviewed.   Constitutional:       Comments: Chronically ill-appearing.  Speaks in complete sentences.   HENT:      Head: Normocephalic and atraumatic.   Eyes:      Extraocular Movements: Extraocular movements intact.      Pupils: Pupils are equal, round, and reactive to light.   Cardiovascular:      Rate and Rhythm: Normal rate and regular rhythm.      Heart sounds: No murmur heard.  Pulmonary:      Comments: Poor inspiratory effort.  Diminished breath sounds bilaterally  Abdominal:      General: There is no distension.      Tenderness: There is no abdominal tenderness. There is no guarding or rebound.   Skin:     General: Skin is warm and dry.      Capillary Refill: Capillary refill takes less than 2 seconds.   Neurological:      General: No focal deficit present.      Mental Status: She is alert and oriented to person, place, and time.      Cranial Nerves: No cranial nerve deficit.               Cruz Toney MD  Resident  05/02/25 1943

## 2025-05-02 NOTE — ED PROVIDER NOTES
ED Attending Attestation Note     Date of evaluation: 5/2/2025    This patient was seen by the resident.  I have seen and examined the patient, agree with the workup, evaluation, management and diagnosis. The care plan has been discussed.  I have reviewed the ECG and concur with the resident's interpretation.  My assessment reveals a chronically ill-appearing 69-year-old female presenting with chief complaint of shortness of breath, and facility concerns for urinary tract infection patient reports that for the past 2 to 3 days she has been experiencing a nonproductive cough and associated shortness of breath.  No fevers, chills, nausea, vomiting, abdominal pain, associated chest pain.  On examination, she is nontoxic-appearing with vital signs notable for hypertension.  She is oxygenating well on room air without signs of respiratory distress.  Lungs are clear to auscultation bilaterally.  No evidence of pneumonia or pulmonary edema on chest x-ray.  Chest x-ray notable for cardiomegaly compatible with her known diastolic CHF.  Creatinine elevated consistent with her ESRD on dialysis, the patient is scheduled for dialysis tomorrow.  Troponins are elevated but stable on 1 hour check, consistent with her ESRD.  BNP is elevated, though without significant respiratory distress, evidence of pulmonary edema, no indication for positive pressure ventilation or diuresis today, as the patient has for volume status regulated by her dialysis.  Urinalysis does demonstrate evidence of cystitis, and review of last culture demonstrates multidrug-resistant E. coli.  Will discharge with renally dosed Bactrim given prior sensitivities and penicillin allergy.  Overall, given her reassuring workup and examination, appropriate for discharge at this time.       Pepito Adams MD  05/02/25 1912

## 2025-05-04 LAB
BACTERIA UR CULT: ABNORMAL
BACTERIA UR CULT: ABNORMAL
ORGANISM: ABNORMAL

## 2025-06-04 ENCOUNTER — HOSPITAL ENCOUNTER (OUTPATIENT)
Dept: CT IMAGING | Age: 70
Discharge: HOME | End: 2025-06-04
Attending: INTERNAL MEDICINE
Payer: MEDICAID

## 2025-06-04 DIAGNOSIS — M89.9 CHRONIC KIDNEY DISEASE-MINERAL BONE DISORDER (CKD-MBD) WITH STAGE 5 CHRONIC KIDNEY DISEASE, ON CHRONIC DIALYSIS (HCC): ICD-10-CM

## 2025-06-04 DIAGNOSIS — E83.9 CHRONIC KIDNEY DISEASE-MINERAL BONE DISORDER (CKD-MBD) WITH STAGE 5 CHRONIC KIDNEY DISEASE, ON CHRONIC DIALYSIS (HCC): ICD-10-CM

## 2025-06-04 DIAGNOSIS — N18.5 CHRONIC KIDNEY DISEASE-MINERAL BONE DISORDER (CKD-MBD) WITH STAGE 5 CHRONIC KIDNEY DISEASE, ON CHRONIC DIALYSIS (HCC): ICD-10-CM

## 2025-06-04 DIAGNOSIS — Z99.2 CHRONIC KIDNEY DISEASE-MINERAL BONE DISORDER (CKD-MBD) WITH STAGE 5 CHRONIC KIDNEY DISEASE, ON CHRONIC DIALYSIS (HCC): ICD-10-CM

## 2025-06-04 PROCEDURE — 74174 CTA ABD&PLVS W/CONTRAST: CPT

## 2025-06-04 PROCEDURE — 6360000004 HC RX CONTRAST MEDICATION: Performed by: INTERNAL MEDICINE

## 2025-06-04 RX ORDER — IOPAMIDOL 755 MG/ML
75 INJECTION, SOLUTION INTRAVASCULAR
Status: COMPLETED | OUTPATIENT
Start: 2025-06-04 | End: 2025-06-04

## 2025-06-04 RX ADMIN — IOPAMIDOL 75 ML: 755 INJECTION, SOLUTION INTRAVENOUS at 14:36

## 2025-06-10 ENCOUNTER — APPOINTMENT (OUTPATIENT)
Dept: GENERAL RADIOLOGY | Age: 70
End: 2025-06-10
Payer: MEDICAID

## 2025-06-10 ENCOUNTER — HOSPITAL ENCOUNTER (INPATIENT)
Age: 70
LOS: 10 days | Discharge: NURSING, REHAB OR LONG-TERM CARE | End: 2025-06-20
Attending: EMERGENCY MEDICINE | Admitting: HOSPITALIST
Payer: MEDICAID

## 2025-06-10 DIAGNOSIS — N39.0 COMPLICATED UTI (URINARY TRACT INFECTION): Primary | ICD-10-CM

## 2025-06-10 DIAGNOSIS — I71.43 INFRARENAL ABDOMINAL AORTIC ANEURYSM (AAA) WITHOUT RUPTURE: ICD-10-CM

## 2025-06-10 DIAGNOSIS — I97.89 TYPE II ENDOLEAK OF AORTIC GRAFT: ICD-10-CM

## 2025-06-10 DIAGNOSIS — Z98.890 STATUS POST ENDOVASCULAR ANEURYSM REPAIR (EVAR): ICD-10-CM

## 2025-06-10 DIAGNOSIS — Z86.79 STATUS POST ENDOVASCULAR ANEURYSM REPAIR (EVAR): ICD-10-CM

## 2025-06-10 DIAGNOSIS — R07.89 ATYPICAL CHEST PAIN: ICD-10-CM

## 2025-06-10 DIAGNOSIS — G89.29 OTHER CHRONIC PAIN: ICD-10-CM

## 2025-06-10 DIAGNOSIS — N18.6 ESRD (END STAGE RENAL DISEASE) (HCC): ICD-10-CM

## 2025-06-10 PROBLEM — Z16.12 UTI DUE TO EXTENDED-SPECTRUM BETA LACTAMASE (ESBL) PRODUCING ESCHERICHIA COLI: Status: ACTIVE | Noted: 2025-06-10

## 2025-06-10 PROBLEM — B96.29 UTI DUE TO EXTENDED-SPECTRUM BETA LACTAMASE (ESBL) PRODUCING ESCHERICHIA COLI: Status: ACTIVE | Noted: 2025-06-10

## 2025-06-10 LAB
ALBUMIN SERPL-MCNC: 4 G/DL (ref 3.4–5)
ALBUMIN/GLOB SERPL: 1.1 {RATIO} (ref 1.1–2.2)
ALP SERPL-CCNC: 87 U/L (ref 40–129)
ALT SERPL-CCNC: 25 U/L (ref 10–40)
ANION GAP SERPL CALCULATED.3IONS-SCNC: 17 MMOL/L (ref 3–16)
ANISOCYTOSIS BLD QL SMEAR: ABNORMAL
AST SERPL-CCNC: 21 U/L (ref 15–37)
BACTERIA URNS QL MICRO: ABNORMAL /HPF
BASE EXCESS BLDV CALC-SCNC: -4.6 MMOL/L (ref -2–3)
BASOPHILS # BLD: 0.1 K/UL (ref 0–0.2)
BASOPHILS NFR BLD: 0.9 %
BILIRUB SERPL-MCNC: 0.3 MG/DL (ref 0–1)
BILIRUB UR QL STRIP.AUTO: NEGATIVE
BUN SERPL-MCNC: 56 MG/DL (ref 7–20)
CALCIUM SERPL-MCNC: 8.5 MG/DL (ref 8.3–10.6)
CHLORIDE SERPL-SCNC: 94 MMOL/L (ref 99–110)
CLARITY UR: ABNORMAL
CO2 BLDV-SCNC: 23 MMOL/L
CO2 SERPL-SCNC: 19 MMOL/L (ref 21–32)
COHGB MFR BLDV: 4 % (ref 0–1.5)
COLOR UR: YELLOW
CREAT SERPL-MCNC: 9.9 MG/DL (ref 0.6–1.2)
DACRYOCYTES BLD QL SMEAR: ABNORMAL
DEPRECATED RDW RBC AUTO: 20.6 % (ref 12.4–15.4)
DO-HGB MFR BLDV: 16.3 %
EOSINOPHIL # BLD: 0.6 K/UL (ref 0–0.6)
EOSINOPHIL NFR BLD: 5.1 %
FLUAV RNA RESP QL NAA+PROBE: NOT DETECTED
FLUBV RNA RESP QL NAA+PROBE: NOT DETECTED
GFR SERPLBLD CREATININE-BSD FMLA CKD-EPI: 4 ML/MIN/{1.73_M2}
GLUCOSE SERPL-MCNC: 130 MG/DL (ref 70–99)
GLUCOSE UR STRIP.AUTO-MCNC: NEGATIVE MG/DL
HCO3 BLDV-SCNC: 21.7 MMOL/L (ref 24–28)
HCT VFR BLD AUTO: 34.7 % (ref 36–48)
HGB BLD-MCNC: 11.6 G/DL (ref 12–16)
HGB UR QL STRIP.AUTO: ABNORMAL
KETONES UR STRIP.AUTO-MCNC: NEGATIVE MG/DL
LEUKOCYTE ESTERASE UR QL STRIP.AUTO: ABNORMAL
LYMPHOCYTES # BLD: 1.4 K/UL (ref 1–5.1)
LYMPHOCYTES NFR BLD: 12.2 %
MACROCYTES BLD QL SMEAR: ABNORMAL
MCH RBC QN AUTO: 30.9 PG (ref 26–34)
MCHC RBC AUTO-ENTMCNC: 33.3 G/DL (ref 31–36)
MCV RBC AUTO: 92.7 FL (ref 80–100)
METHGB MFR BLDV: 0.2 % (ref 0–1.5)
MICROCYTES BLD QL SMEAR: ABNORMAL
MONOCYTES # BLD: 0.7 K/UL (ref 0–1.3)
MONOCYTES NFR BLD: 6.1 %
NEUTROPHILS # BLD: 8.9 K/UL (ref 1.7–7.7)
NEUTROPHILS NFR BLD: 75.7 %
NITRITE UR QL STRIP.AUTO: NEGATIVE
NT-PROBNP SERPL-MCNC: 7856 PG/ML (ref 0–124)
PCO2 BLDV: 43.8 MMHG (ref 41–51)
PH BLDV: 7.3 [PH] (ref 7.35–7.45)
PH UR STRIP.AUTO: 6 [PH] (ref 5–8)
PLATELET # BLD AUTO: 221 K/UL (ref 135–450)
PMV BLD AUTO: 7.3 FL (ref 5–10.5)
PO2 BLDV: 53.2 MMHG (ref 25–40)
POTASSIUM SERPL-SCNC: 4.7 MMOL/L (ref 3.5–5.1)
PROT SERPL-MCNC: 7.6 G/DL (ref 6.4–8.2)
PROT UR STRIP.AUTO-MCNC: 100 MG/DL
RBC # BLD AUTO: 3.75 M/UL (ref 4–5.2)
RBC #/AREA URNS HPF: ABNORMAL /HPF (ref 0–4)
SAO2 % BLDV: 83 %
SARS-COV-2 RNA RESP QL NAA+PROBE: NOT DETECTED
SODIUM SERPL-SCNC: 130 MMOL/L (ref 136–145)
SP GR UR STRIP.AUTO: 1.01 (ref 1–1.03)
STOMATOCYTES BLD QL SMEAR: ABNORMAL
TROPONIN, HIGH SENSITIVITY: 49 NG/L (ref 0–14)
TROPONIN, HIGH SENSITIVITY: 49 NG/L (ref 0–14)
UA COMPLETE W REFLEX CULTURE PNL UR: YES
UA DIPSTICK W REFLEX MICRO PNL UR: YES
URN SPEC COLLECT METH UR: ABNORMAL
UROBILINOGEN UR STRIP-ACNC: 0.2 E.U./DL
WBC # BLD AUTO: 11.8 K/UL (ref 4–11)
WBC #/AREA URNS HPF: ABNORMAL /HPF (ref 0–5)

## 2025-06-10 PROCEDURE — 6360000002 HC RX W HCPCS: Performed by: HOSPITALIST

## 2025-06-10 PROCEDURE — 87077 CULTURE AEROBIC IDENTIFY: CPT

## 2025-06-10 PROCEDURE — 99285 EMERGENCY DEPT VISIT HI MDM: CPT

## 2025-06-10 PROCEDURE — 83880 ASSAY OF NATRIURETIC PEPTIDE: CPT

## 2025-06-10 PROCEDURE — 99255 IP/OBS CONSLTJ NEW/EST HI 80: CPT | Performed by: SURGERY

## 2025-06-10 PROCEDURE — 87086 URINE CULTURE/COLONY COUNT: CPT

## 2025-06-10 PROCEDURE — 2500000003 HC RX 250 WO HCPCS: Performed by: HOSPITALIST

## 2025-06-10 PROCEDURE — 6360000002 HC RX W HCPCS: Performed by: EMERGENCY MEDICINE

## 2025-06-10 PROCEDURE — 84484 ASSAY OF TROPONIN QUANT: CPT

## 2025-06-10 PROCEDURE — 1200000000 HC SEMI PRIVATE

## 2025-06-10 PROCEDURE — 93005 ELECTROCARDIOGRAM TRACING: CPT | Performed by: EMERGENCY MEDICINE

## 2025-06-10 PROCEDURE — 6370000000 HC RX 637 (ALT 250 FOR IP): Performed by: EMERGENCY MEDICINE

## 2025-06-10 PROCEDURE — 2580000003 HC RX 258: Performed by: EMERGENCY MEDICINE

## 2025-06-10 PROCEDURE — 85025 COMPLETE CBC W/AUTO DIFF WBC: CPT

## 2025-06-10 PROCEDURE — 82803 BLOOD GASES ANY COMBINATION: CPT

## 2025-06-10 PROCEDURE — 71045 X-RAY EXAM CHEST 1 VIEW: CPT

## 2025-06-10 PROCEDURE — 81001 URINALYSIS AUTO W/SCOPE: CPT

## 2025-06-10 PROCEDURE — 6370000000 HC RX 637 (ALT 250 FOR IP): Performed by: HOSPITALIST

## 2025-06-10 PROCEDURE — 87186 SC STD MICRODIL/AGAR DIL: CPT

## 2025-06-10 PROCEDURE — 80053 COMPREHEN METABOLIC PANEL: CPT

## 2025-06-10 PROCEDURE — 94640 AIRWAY INHALATION TREATMENT: CPT

## 2025-06-10 PROCEDURE — 87636 SARSCOV2 & INF A&B AMP PRB: CPT

## 2025-06-10 RX ORDER — ACETAMINOPHEN 650 MG/1
650 SUPPOSITORY RECTAL EVERY 6 HOURS PRN
Status: DISCONTINUED | OUTPATIENT
Start: 2025-06-10 | End: 2025-06-20 | Stop reason: HOSPADM

## 2025-06-10 RX ORDER — ATORVASTATIN CALCIUM 40 MG/1
40 TABLET, FILM COATED ORAL NIGHTLY
Status: DISCONTINUED | OUTPATIENT
Start: 2025-06-10 | End: 2025-06-20 | Stop reason: HOSPADM

## 2025-06-10 RX ORDER — ASPIRIN 81 MG/1
81 TABLET ORAL DAILY
Status: DISCONTINUED | OUTPATIENT
Start: 2025-06-11 | End: 2025-06-20 | Stop reason: HOSPADM

## 2025-06-10 RX ORDER — METOPROLOL SUCCINATE 50 MG/1
50 TABLET, EXTENDED RELEASE ORAL NIGHTLY
Status: DISCONTINUED | OUTPATIENT
Start: 2025-06-10 | End: 2025-06-20 | Stop reason: HOSPADM

## 2025-06-10 RX ORDER — CETIRIZINE HYDROCHLORIDE 10 MG/1
5 TABLET ORAL DAILY
Status: DISCONTINUED | OUTPATIENT
Start: 2025-06-11 | End: 2025-06-20 | Stop reason: HOSPADM

## 2025-06-10 RX ORDER — SEVELAMER CARBONATE 800 MG/1
1600 TABLET, FILM COATED ORAL
Status: DISCONTINUED | OUTPATIENT
Start: 2025-06-10 | End: 2025-06-20 | Stop reason: HOSPADM

## 2025-06-10 RX ORDER — DIVALPROEX SODIUM 125 MG/1
125 TABLET, DELAYED RELEASE ORAL NIGHTLY
Status: DISCONTINUED | OUTPATIENT
Start: 2025-06-10 | End: 2025-06-12

## 2025-06-10 RX ORDER — DIPHENHYDRAMINE HCL 25 MG
25 TABLET ORAL EVERY 4 HOURS PRN
Status: DISCONTINUED | OUTPATIENT
Start: 2025-06-10 | End: 2025-06-20 | Stop reason: HOSPADM

## 2025-06-10 RX ORDER — ALBUTEROL SULFATE 0.83 MG/ML
2.5 SOLUTION RESPIRATORY (INHALATION) EVERY 4 HOURS PRN
COMMUNITY

## 2025-06-10 RX ORDER — PANTOPRAZOLE SODIUM 40 MG/1
40 TABLET, DELAYED RELEASE ORAL
Status: DISCONTINUED | OUTPATIENT
Start: 2025-06-11 | End: 2025-06-20 | Stop reason: HOSPADM

## 2025-06-10 RX ORDER — SODIUM CHLORIDE 9 MG/ML
INJECTION, SOLUTION INTRAVENOUS PRN
Status: DISCONTINUED | OUTPATIENT
Start: 2025-06-10 | End: 2025-06-20 | Stop reason: HOSPADM

## 2025-06-10 RX ORDER — ONDANSETRON 4 MG/1
4 TABLET, ORALLY DISINTEGRATING ORAL EVERY 8 HOURS PRN
Status: DISCONTINUED | OUTPATIENT
Start: 2025-06-10 | End: 2025-06-10

## 2025-06-10 RX ORDER — METOCLOPRAMIDE HYDROCHLORIDE 5 MG/ML
5 INJECTION INTRAMUSCULAR; INTRAVENOUS 2 TIMES DAILY PRN
Status: DISCONTINUED | OUTPATIENT
Start: 2025-06-10 | End: 2025-06-20 | Stop reason: HOSPADM

## 2025-06-10 RX ORDER — ALBUTEROL SULFATE 0.83 MG/ML
2.5 SOLUTION RESPIRATORY (INHALATION) ONCE
Status: COMPLETED | OUTPATIENT
Start: 2025-06-10 | End: 2025-06-10

## 2025-06-10 RX ORDER — HEPARIN SODIUM 5000 [USP'U]/ML
5000 INJECTION, SOLUTION INTRAVENOUS; SUBCUTANEOUS EVERY 8 HOURS SCHEDULED
Status: DISCONTINUED | OUTPATIENT
Start: 2025-06-10 | End: 2025-06-20 | Stop reason: HOSPADM

## 2025-06-10 RX ORDER — OXYCODONE AND ACETAMINOPHEN 5; 325 MG/1; MG/1
1 TABLET ORAL
Refills: 0 | Status: COMPLETED | OUTPATIENT
Start: 2025-06-10 | End: 2025-06-10

## 2025-06-10 RX ORDER — NORTRIPTYLINE HYDROCHLORIDE 25 MG/1
25 CAPSULE ORAL NIGHTLY
Status: DISCONTINUED | OUTPATIENT
Start: 2025-06-10 | End: 2025-06-12

## 2025-06-10 RX ORDER — ONDANSETRON 2 MG/ML
4 INJECTION INTRAMUSCULAR; INTRAVENOUS EVERY 6 HOURS PRN
Status: DISCONTINUED | OUTPATIENT
Start: 2025-06-10 | End: 2025-06-10

## 2025-06-10 RX ORDER — POLYETHYLENE GLYCOL 3350 17 G/17G
17 POWDER, FOR SOLUTION ORAL DAILY PRN
Status: DISCONTINUED | OUTPATIENT
Start: 2025-06-10 | End: 2025-06-20 | Stop reason: HOSPADM

## 2025-06-10 RX ORDER — SODIUM CHLORIDE 0.9 % (FLUSH) 0.9 %
5-40 SYRINGE (ML) INJECTION PRN
Status: DISCONTINUED | OUTPATIENT
Start: 2025-06-10 | End: 2025-06-20 | Stop reason: HOSPADM

## 2025-06-10 RX ORDER — SODIUM CHLORIDE 0.9 % (FLUSH) 0.9 %
5-40 SYRINGE (ML) INJECTION EVERY 12 HOURS SCHEDULED
Status: DISCONTINUED | OUTPATIENT
Start: 2025-06-10 | End: 2025-06-20 | Stop reason: HOSPADM

## 2025-06-10 RX ORDER — TORSEMIDE 20 MG/1
40 TABLET ORAL EVERY MORNING
Status: DISCONTINUED | OUTPATIENT
Start: 2025-06-11 | End: 2025-06-20 | Stop reason: HOSPADM

## 2025-06-10 RX ORDER — ACETAMINOPHEN 325 MG/1
650 TABLET ORAL EVERY 6 HOURS PRN
Status: DISCONTINUED | OUTPATIENT
Start: 2025-06-10 | End: 2025-06-20 | Stop reason: HOSPADM

## 2025-06-10 RX ORDER — IPRATROPIUM BROMIDE AND ALBUTEROL SULFATE 2.5; .5 MG/3ML; MG/3ML
1 SOLUTION RESPIRATORY (INHALATION)
Status: DISCONTINUED | OUTPATIENT
Start: 2025-06-10 | End: 2025-06-11

## 2025-06-10 RX ORDER — AMIODARONE HYDROCHLORIDE 200 MG/1
200 TABLET ORAL 2 TIMES DAILY
Status: DISCONTINUED | OUTPATIENT
Start: 2025-06-10 | End: 2025-06-20 | Stop reason: HOSPADM

## 2025-06-10 RX ORDER — OXYCODONE AND ACETAMINOPHEN 5; 325 MG/1; MG/1
1 TABLET ORAL EVERY 4 HOURS PRN
Status: ON HOLD | COMMUNITY
End: 2025-06-20

## 2025-06-10 RX ORDER — FLUTICASONE PROPIONATE 50 MCG
1 SPRAY, SUSPENSION (ML) NASAL DAILY
Status: DISCONTINUED | OUTPATIENT
Start: 2025-06-10 | End: 2025-06-20 | Stop reason: HOSPADM

## 2025-06-10 RX ORDER — CALCITRIOL 0.25 UG/1
1 CAPSULE, LIQUID FILLED ORAL 2 TIMES DAILY
Status: DISCONTINUED | OUTPATIENT
Start: 2025-06-10 | End: 2025-06-12

## 2025-06-10 RX ADMIN — ATORVASTATIN CALCIUM 40 MG: 40 TABLET, FILM COATED ORAL at 20:51

## 2025-06-10 RX ADMIN — MEROPENEM 1000 MG: 1 INJECTION INTRAVENOUS at 16:09

## 2025-06-10 RX ADMIN — SEVELAMER CARBONATE 1600 MG: 800 TABLET, FILM COATED ORAL at 20:51

## 2025-06-10 RX ADMIN — NORTRIPTYLINE HYDROCHLORIDE 25 MG: 25 CAPSULE ORAL at 20:51

## 2025-06-10 RX ADMIN — ALBUTEROL SULFATE 2.5 MG: 2.5 SOLUTION RESPIRATORY (INHALATION) at 14:35

## 2025-06-10 RX ADMIN — CALCITRIOL CAPSULES 0.25 MCG 1 MCG: 0.25 CAPSULE ORAL at 20:51

## 2025-06-10 RX ADMIN — HEPARIN SODIUM 5000 UNITS: 5000 INJECTION INTRAVENOUS; SUBCUTANEOUS at 21:00

## 2025-06-10 RX ADMIN — DIVALPROEX SODIUM 125 MG: 125 TABLET, DELAYED RELEASE ORAL at 20:51

## 2025-06-10 RX ADMIN — SODIUM CHLORIDE, PRESERVATIVE FREE 10 ML: 5 INJECTION INTRAVENOUS at 21:00

## 2025-06-10 RX ADMIN — AMIODARONE HYDROCHLORIDE 200 MG: 200 TABLET ORAL at 20:51

## 2025-06-10 RX ADMIN — METOPROLOL SUCCINATE 50 MG: 50 TABLET, EXTENDED RELEASE ORAL at 20:50

## 2025-06-10 RX ADMIN — OXYCODONE HYDROCHLORIDE AND ACETAMINOPHEN 1 TABLET: 5; 325 TABLET ORAL at 16:10

## 2025-06-10 ASSESSMENT — PAIN DESCRIPTION - ORIENTATION: ORIENTATION: LEFT;RIGHT;LOWER

## 2025-06-10 ASSESSMENT — PAIN DESCRIPTION - LOCATION
LOCATION: BACK;LEG
LOCATION: GENERALIZED

## 2025-06-10 ASSESSMENT — PAIN SCALES - GENERAL
PAINLEVEL_OUTOF10: 9
PAINLEVEL_OUTOF10: 10

## 2025-06-10 NOTE — ED PROVIDER NOTES
[facility]  EMERGENCY DEPARTMENT ENCOUNTER          ATTENDING PHYSICIAN NOTE       Date of evaluation: 6/10/2025    Chief Complaint     Shortness of Breath (Pt presents to ED from dialysis d/t low heart rate and c/o SOB. Pt did not receive HD tx today. Reports that her eyes and nose have been burning d/t the air at facility she lives. Pt c/o dizziness and chest tightness.)      History of Present Illness     Florida Saleem is a 69 y.o. female who presents this is a 69-year-old female who presents to the emergency room today complaining of burning in her eyes and her nose for the last few days.  Patient states that she thinks the symptoms are from allergies but does note that they did not seem to get better when she left her facility.  She apparently was at dialysis this morning she last went on Saturday for a complete run while she was there she complained of some tightness in her chest which she states is mostly resolved now.    Review of Systems     Review of Systems  All systems were reviewed with the patient/family and negative except as otherwise documented in the HPI.      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  She has a past surgical history that includes Appendectomy; Cholecystectomy; Kidney cyst removal; IR TUNNELED CVC PLACE WO SQ PORT/PUMP > 5 YEARS (06/01/2021); AAA repair, endovascular (N/A, 09/08/2023); and Colonoscopy (N/A, 11/5/2024).  Her family history includes Unknown in her father and mother.  She reports that she has been smoking cigarettes. She has a 25  (TOPROL XL) 50 MG extended release tablet Take 1 tablet by mouth at bedtime      polyethylene glycol (GLYCOLAX) 17 g packet Take 1 packet by mouth every morning             Allergies     She is allergic to abilify [aripiprazole], baclofen, celecoxib, diclofenac, fentanyl, hydrocodone, ibuprofen, mirtazapine, morphine, naproxen, paroxetine, penicillins, quetiapine fumarate, sertraline, and trazodone and nefazodone.    Physical Exam     INITIAL VITALS: BP: 132/81, Temp: 97.4 °F (36.3 °C), Pulse: (!) 45, Respirations: 20, SpO2: 96 %   Physical Exam  Constitutional: Elderly appearing female lying in the hospital stretcher comfortable and in no acute distress  Eyes:  Sclera anicteric.  HEENT:  Normocephalic, oropharynx moist.   Neck: normal range of motion, supple.  Respiratory:  Even and non-labored, no distress, clear to auscultation bilaterally in the anterior lung  Cardiovascular:  Extremities warm, well perfused bradycardic without audible murmurs rubs or gallops, there is a tunneled dialysis catheter in the right chest wall without surrounding warmth or erythema  GI:  Soft, mildly distended, there is a tender mass palpable on the left abdomen  Musculoskeletal:  No edema, no deformities.   Skin:  No rash, no lesions, no pallor   Neurologic:  Awake and alert. Moving all extremities purposefully and with grossly normal coordination.    Psychiatric:  Normal mood.  Behavior appropriate.      Diagnostic Results     EKG   Sinus bradycardia with first-degree AV block, borderline prolonged QT no acute ischemic appearing changes.    RADIOLOGY:  CTA CHEST ABDOMEN PELVIS W CONTRAST   Final Result   Stable vascular findings including aortobiiliac stent graft and   dilation of the excluded aneurysmal sac to 6.1 cm. Findings, as described above,   are suggestive of an endoleak.      No acute vascular abnormality of the chest.      Stable complex left renal lesions, indeterminate. Follow-up MRI of the abdomen   without and with

## 2025-06-10 NOTE — PROGRESS NOTES
This RN called Pt's facility and requested Pt's current medication list be faxed and provided fax number. Reported they would fax now.

## 2025-06-10 NOTE — ED NOTES
Patient Name: Florida Saleem  : 1955 69 y.o.  MRN: [id number]  ED Room #: A01/A01-01     Chief complaint:   Chief Complaint   Patient presents with    Shortness of Breath     Pt presents to ED from dialysis d/t low heart rate and c/o SOB. Pt did not receive HD tx today. Reports that her eyes and nose have been burning d/t the air at facility she lives. Pt c/o dizziness and chest tightness.     Hospital Problem/Diagnosis:   Hospital Problems           Last Modified POA    * (Principal) UTI due to extended-spectrum beta lactamase (ESBL) producing Escherichia coli 6/10/2025 Yes         O2 Flow Rate:O2 Device: None (Room air)   (if applicable)  Cardiac Rhythm:   (if applicable)  Active LDA's:   Peripheral IV 06/10/25 Right Antecubital (Active)            How does patient ambulate? Wheelchair    2. How does patient take pills? Whole with Water    3. Is patient alert? Alert    4. Is patient oriented? To Person, To Place, To Time, To Situation, and Follows Commands    5.   Patient arrived from:  nursing home  Facility Name: ___________________________________________    6. If patient is disoriented or from a Skill Nursing Facility has family been notified of admission?     7. Patient belongings? Belongings: Clothing    Disposition of belongings? Kept with Patient     8. Any specific patient or family belongings/needs/dynamics?   a.     9. Miscellaneous comments/pending orders?  a. This patient is alert and fully oriented. From [place]. Wheelchair bound. HD vas cath in R chest, no limb restrictions. Tue, Thurs, Sat HD. Vitals stable, on room air. 20 G IV in R AC. Purewick in place.      If there are any additional questions please reach out to the Emergency Department.       Sonja Reed RN  06/10/25 6528

## 2025-06-10 NOTE — H&P
V2.0  History and Physical      Name:  Florida Saleem /Age/Sex: 1955  (69 y.o. female)   MRN & CSN:  [id number] & [id number] Encounter Date/Time: 6/10/2025 3:31 PM EDT   [facility] PCP: Kim Liu MD       Hospital Day: 1    Assessment and Plan:   69-year-old lady who is a nursing home resident/wheelchair-bound known case of end-stage renal disease on hemodialysis, hypertension, hyperlipidemia, gout, polycystic kidney disease, and history of psychosis, type 2 diabetes, AAA status post repair, asthma and COPD who presented with multiple complaints.    Patient stated that 2 to 3 days ago, she was moved to a different room at the nursing facility where she stays.  She stated she is exposed to some sort of fumes or new AC which causing her a lot of problems.  She has been having burning in her eyes and nose.  She has been having shortness of breath, cough, and wheezing.  She also has been having some chest tightness.  She also has dysuria, epigastric pain, and left lower quadrant pain.  She kept saying that she does not feel well at all.  No constipation or diarrhea.     She was see in the ED on  for burning nose related to inhaling fumes at her NH that is associated with SOB, productive cough , chest tightness with abnormal UA which seems similar to this admission. She was started on Bactrim.  Her urine culture from  which did show ESBL as well    She told me that in the last 1 to 2 weeks she was diagnosed with a UTI and was started on antibiotics that made her urine orange.  I am not able to see this in her chart but I wonder if she is confusing it with her ED visit from a month ago    She went to do her dialysis today where she was having low heart rate and shortness of breath so she was sent to the ER.  She did not do her dialysis today    On Saturday she had some chest tightness during an dialysis that has resolved    Last admission was in last November for colitis and GI bleed.

## 2025-06-10 NOTE — PROGRESS NOTES
4 Eyes Skin Assessment     NAME:  Florida Saleem  YOB: 1955  MEDICAL RECORD NUMBER:  [id number]    The patient is being assessed for  Admission    I agree that at least one RN has performed a thorough Head to Toe Skin Assessment on the patient. ALL assessment sites listed below have been assessed.      Areas assessed by both nurses:    Head, Face, Ears, Shoulders, Back, Chest, Arms, Elbows, Hands, Sacrum. Buttock, Coccyx, Ischium, Legs. Feet and Heels, and Under Medical Devices         Does the Patient have a Wound? No noted wound(s)   -exception to dry flaky skin to b/l heels      Reginald Prevention initiated by RN: No  Wound Care Orders initiated by RN: No    Pressure Injury (Stage 3,4, Unstageable, DTI, NWPT, and Complex wounds) if present, place Wound referral order by RN under : No    New Ostomies, if present place, Ostomy referral order under : {YES/NO:65856}     Nurse 1 eSignature: Electronically signed by Navid Taylor RN on 6/10/25 at 6:36 PM EDT    **SHARE this note so that the co-signing nurse can place an eSignature**    Nurse 2 eSignature: {Esignature:[id number]}

## 2025-06-11 ENCOUNTER — APPOINTMENT (OUTPATIENT)
Dept: CT IMAGING | Age: 70
End: 2025-06-11
Payer: MEDICAID

## 2025-06-11 LAB
ALBUMIN SERPL-MCNC: 3.8 G/DL (ref 3.4–5)
ANION GAP SERPL CALCULATED.3IONS-SCNC: 17 MMOL/L (ref 3–16)
ANISOCYTOSIS BLD QL SMEAR: ABNORMAL
BASOPHILS # BLD: 0.2 K/UL (ref 0–0.2)
BASOPHILS NFR BLD: 2 %
BUN SERPL-MCNC: 65 MG/DL (ref 7–20)
CALCIUM SERPL-MCNC: 8.3 MG/DL (ref 8.3–10.6)
CHLORIDE SERPL-SCNC: 93 MMOL/L (ref 99–110)
CO2 SERPL-SCNC: 18 MMOL/L (ref 21–32)
CREAT SERPL-MCNC: 11.3 MG/DL (ref 0.6–1.2)
DACRYOCYTES BLD QL SMEAR: ABNORMAL
DEPRECATED RDW RBC AUTO: 20.8 % (ref 12.4–15.4)
EOSINOPHIL # BLD: 0.4 K/UL (ref 0–0.6)
EOSINOPHIL NFR BLD: 4 %
GFR SERPLBLD CREATININE-BSD FMLA CKD-EPI: 3 ML/MIN/{1.73_M2}
GLUCOSE SERPL-MCNC: 89 MG/DL (ref 70–99)
HCT VFR BLD AUTO: 38.5 % (ref 36–48)
HGB BLD-MCNC: 12.6 G/DL (ref 12–16)
LYMPHOCYTES # BLD: 1.6 K/UL (ref 1–5.1)
LYMPHOCYTES NFR BLD: 14 %
MACROCYTES BLD QL SMEAR: ABNORMAL
MCH RBC QN AUTO: 30.2 PG (ref 26–34)
MCHC RBC AUTO-ENTMCNC: 32.9 G/DL (ref 31–36)
MCV RBC AUTO: 91.8 FL (ref 80–100)
MICROCYTES BLD QL SMEAR: ABNORMAL
MONOCYTES # BLD: 0.7 K/UL (ref 0–1.3)
MONOCYTES NFR BLD: 6 %
NEUTROPHILS # BLD: 8.3 K/UL (ref 1.7–7.7)
NEUTROPHILS NFR BLD: 74 %
PHOSPHATE SERPL-MCNC: 5.6 MG/DL (ref 2.5–4.9)
PLATELET # BLD AUTO: ABNORMAL K/UL (ref 135–450)
PLATELET BLD QL SMEAR: ABNORMAL
PMV BLD AUTO: ABNORMAL FL (ref 5–10.5)
POLYCHROMASIA BLD QL SMEAR: ABNORMAL
POTASSIUM SERPL-SCNC: 5.9 MMOL/L (ref 3.5–5.1)
RBC # BLD AUTO: 4.19 M/UL (ref 4–5.2)
SCHISTOCYTES BLD QL SMEAR: ABNORMAL
SODIUM SERPL-SCNC: 128 MMOL/L (ref 136–145)
STOMATOCYTES BLD QL SMEAR: ABNORMAL
WBC # BLD AUTO: 11.2 K/UL (ref 4–11)

## 2025-06-11 PROCEDURE — 6360000002 HC RX W HCPCS: Performed by: NURSE PRACTITIONER

## 2025-06-11 PROCEDURE — 6360000004 HC RX CONTRAST MEDICATION: Performed by: HOSPITALIST

## 2025-06-11 PROCEDURE — 80069 RENAL FUNCTION PANEL: CPT

## 2025-06-11 PROCEDURE — 6370000000 HC RX 637 (ALT 250 FOR IP): Performed by: HOSPITALIST

## 2025-06-11 PROCEDURE — 6370000000 HC RX 637 (ALT 250 FOR IP): Performed by: NURSE PRACTITIONER

## 2025-06-11 PROCEDURE — 2500000003 HC RX 250 WO HCPCS: Performed by: HOSPITALIST

## 2025-06-11 PROCEDURE — 99223 1ST HOSP IP/OBS HIGH 75: CPT | Performed by: HOSPITALIST

## 2025-06-11 PROCEDURE — 85025 COMPLETE CBC W/AUTO DIFF WBC: CPT

## 2025-06-11 PROCEDURE — 6360000002 HC RX W HCPCS: Performed by: HOSPITALIST

## 2025-06-11 PROCEDURE — 71275 CT ANGIOGRAPHY CHEST: CPT

## 2025-06-11 PROCEDURE — 1200000000 HC SEMI PRIVATE

## 2025-06-11 PROCEDURE — 36415 COLL VENOUS BLD VENIPUNCTURE: CPT

## 2025-06-11 PROCEDURE — 5A1D70Z PERFORMANCE OF URINARY FILTRATION, INTERMITTENT, LESS THAN 6 HOURS PER DAY: ICD-10-PCS | Performed by: PHYSICIAN ASSISTANT

## 2025-06-11 PROCEDURE — 2580000003 HC RX 258: Performed by: HOSPITALIST

## 2025-06-11 PROCEDURE — 90935 HEMODIALYSIS ONE EVALUATION: CPT

## 2025-06-11 RX ORDER — OXYCODONE AND ACETAMINOPHEN 5; 325 MG/1; MG/1
1 TABLET ORAL
Refills: 0 | Status: COMPLETED | OUTPATIENT
Start: 2025-06-11 | End: 2025-06-11

## 2025-06-11 RX ORDER — OXYCODONE HYDROCHLORIDE 5 MG/1
5 TABLET ORAL EVERY 6 HOURS PRN
Refills: 0 | Status: DISCONTINUED | OUTPATIENT
Start: 2025-06-11 | End: 2025-06-20 | Stop reason: HOSPADM

## 2025-06-11 RX ORDER — NIFEDIPINE 30 MG
60 TABLET, EXTENDED RELEASE ORAL DAILY
Status: DISCONTINUED | OUTPATIENT
Start: 2025-06-11 | End: 2025-06-20 | Stop reason: HOSPADM

## 2025-06-11 RX ORDER — IPRATROPIUM BROMIDE AND ALBUTEROL SULFATE 2.5; .5 MG/3ML; MG/3ML
1 SOLUTION RESPIRATORY (INHALATION)
Status: DISCONTINUED | OUTPATIENT
Start: 2025-06-11 | End: 2025-06-12

## 2025-06-11 RX ORDER — IOPAMIDOL 755 MG/ML
75 INJECTION, SOLUTION INTRAVASCULAR
Status: COMPLETED | OUTPATIENT
Start: 2025-06-11 | End: 2025-06-11

## 2025-06-11 RX ORDER — IPRATROPIUM BROMIDE AND ALBUTEROL SULFATE 2.5; .5 MG/3ML; MG/3ML
1 SOLUTION RESPIRATORY (INHALATION)
Status: DISCONTINUED | OUTPATIENT
Start: 2025-06-11 | End: 2025-06-11 | Stop reason: SDUPTHER

## 2025-06-11 RX ORDER — HYDROMORPHONE HYDROCHLORIDE 1 MG/ML
0.5 INJECTION, SOLUTION INTRAMUSCULAR; INTRAVENOUS; SUBCUTANEOUS EVERY 8 HOURS PRN
Status: COMPLETED | OUTPATIENT
Start: 2025-06-11 | End: 2025-06-12

## 2025-06-11 RX ADMIN — HEPARIN SODIUM 5000 UNITS: 5000 INJECTION INTRAVENOUS; SUBCUTANEOUS at 06:49

## 2025-06-11 RX ADMIN — DIPHENHYDRAMINE HYDROCHLORIDE 25 MG: 25 TABLET ORAL at 20:57

## 2025-06-11 RX ADMIN — DIVALPROEX SODIUM 125 MG: 125 TABLET, DELAYED RELEASE ORAL at 20:49

## 2025-06-11 RX ADMIN — ASPIRIN 81 MG: 81 TABLET, COATED ORAL at 08:25

## 2025-06-11 RX ADMIN — IOPAMIDOL 75 ML: 755 INJECTION, SOLUTION INTRAVENOUS at 08:58

## 2025-06-11 RX ADMIN — CALCITRIOL CAPSULES 0.25 MCG 1 MCG: 0.25 CAPSULE ORAL at 20:48

## 2025-06-11 RX ADMIN — SEVELAMER CARBONATE 1600 MG: 800 TABLET, FILM COATED ORAL at 08:25

## 2025-06-11 RX ADMIN — MEROPENEM 500 MG: 500 INJECTION INTRAVENOUS at 19:40

## 2025-06-11 RX ADMIN — AMIODARONE HYDROCHLORIDE 200 MG: 200 TABLET ORAL at 08:25

## 2025-06-11 RX ADMIN — PANTOPRAZOLE SODIUM 40 MG: 40 TABLET, DELAYED RELEASE ORAL at 19:30

## 2025-06-11 RX ADMIN — TORSEMIDE 40 MG: 20 TABLET ORAL at 08:25

## 2025-06-11 RX ADMIN — ATORVASTATIN CALCIUM 40 MG: 40 TABLET, FILM COATED ORAL at 20:48

## 2025-06-11 RX ADMIN — HYDROMORPHONE HYDROCHLORIDE 0.5 MG: 1 INJECTION, SOLUTION INTRAMUSCULAR; INTRAVENOUS; SUBCUTANEOUS at 11:08

## 2025-06-11 RX ADMIN — SEVELAMER CARBONATE 1600 MG: 800 TABLET, FILM COATED ORAL at 11:08

## 2025-06-11 RX ADMIN — HEPARIN SODIUM 5000 UNITS: 5000 INJECTION INTRAVENOUS; SUBCUTANEOUS at 20:49

## 2025-06-11 RX ADMIN — NIFEDIPINE 60 MG: 30 TABLET, EXTENDED RELEASE ORAL at 08:25

## 2025-06-11 RX ADMIN — OXYCODONE 5 MG: 5 TABLET ORAL at 08:24

## 2025-06-11 RX ADMIN — NORTRIPTYLINE HYDROCHLORIDE 25 MG: 25 CAPSULE ORAL at 20:48

## 2025-06-11 RX ADMIN — SODIUM CHLORIDE, PRESERVATIVE FREE 10 ML: 5 INJECTION INTRAVENOUS at 20:49

## 2025-06-11 RX ADMIN — HEPARIN SODIUM 5000 UNITS: 5000 INJECTION INTRAVENOUS; SUBCUTANEOUS at 14:51

## 2025-06-11 RX ADMIN — OXYCODONE AND ACETAMINOPHEN 1 TABLET: 5; 325 TABLET ORAL at 00:56

## 2025-06-11 RX ADMIN — SEVELAMER CARBONATE 1600 MG: 800 TABLET, FILM COATED ORAL at 19:30

## 2025-06-11 RX ADMIN — CALCITRIOL CAPSULES 0.25 MCG 1 MCG: 0.25 CAPSULE ORAL at 08:25

## 2025-06-11 RX ADMIN — METOCLOPRAMIDE 5 MG: 5 INJECTION, SOLUTION INTRAMUSCULAR; INTRAVENOUS at 20:01

## 2025-06-11 RX ADMIN — OXYCODONE 5 MG: 5 TABLET ORAL at 13:03

## 2025-06-11 RX ADMIN — OXYCODONE 5 MG: 5 TABLET ORAL at 19:30

## 2025-06-11 RX ADMIN — CETIRIZINE HYDROCHLORIDE 5 MG: 10 TABLET, FILM COATED ORAL at 08:25

## 2025-06-11 RX ADMIN — PANTOPRAZOLE SODIUM 40 MG: 40 TABLET, DELAYED RELEASE ORAL at 06:49

## 2025-06-11 RX ADMIN — METOPROLOL SUCCINATE 50 MG: 50 TABLET, EXTENDED RELEASE ORAL at 20:48

## 2025-06-11 RX ADMIN — AMIODARONE HYDROCHLORIDE 200 MG: 200 TABLET ORAL at 20:49

## 2025-06-11 RX ADMIN — ACETAMINOPHEN 650 MG: 325 TABLET ORAL at 15:02

## 2025-06-11 ASSESSMENT — PAIN SCALES - GENERAL
PAINLEVEL_OUTOF10: 1
PAINLEVEL_OUTOF10: 7
PAINLEVEL_OUTOF10: 8
PAINLEVEL_OUTOF10: 8
PAINLEVEL_OUTOF10: 3
PAINLEVEL_OUTOF10: 6
PAINLEVEL_OUTOF10: 3
PAINLEVEL_OUTOF10: 3
PAINLEVEL_OUTOF10: 6
PAINLEVEL_OUTOF10: 0
PAINLEVEL_OUTOF10: 6
PAINLEVEL_OUTOF10: 3
PAINLEVEL_OUTOF10: 10

## 2025-06-11 ASSESSMENT — PAIN DESCRIPTION - DESCRIPTORS
DESCRIPTORS: SHARP;ACHING;DISCOMFORT
DESCRIPTORS: SHARP;ACHING;DISCOMFORT
DESCRIPTORS: STABBING
DESCRIPTORS: SHARP;ACHING;DISCOMFORT
DESCRIPTORS: SHARP;ACHING;DISCOMFORT
DESCRIPTORS: STABBING
DESCRIPTORS: SHARP;ACHING;DISCOMFORT

## 2025-06-11 ASSESSMENT — PAIN DESCRIPTION - ONSET
ONSET: ON-GOING

## 2025-06-11 ASSESSMENT — PAIN - FUNCTIONAL ASSESSMENT

## 2025-06-11 ASSESSMENT — PAIN DESCRIPTION - LOCATION
LOCATION: BACK;ABDOMEN
LOCATION: ABDOMEN;BACK
LOCATION: ABDOMEN;BACK
LOCATION: BACK;ABDOMEN
LOCATION: ABDOMEN;BACK
LOCATION: BACK
LOCATION: BACK

## 2025-06-11 ASSESSMENT — PAIN DESCRIPTION - PAIN TYPE
TYPE: ACUTE PAIN

## 2025-06-11 ASSESSMENT — PAIN DESCRIPTION - ORIENTATION
ORIENTATION: LOWER
ORIENTATION: LOWER
ORIENTATION: POSTERIOR;LOWER
ORIENTATION: POSTERIOR;LOWER
ORIENTATION: LOWER

## 2025-06-11 ASSESSMENT — PAIN DESCRIPTION - FREQUENCY
FREQUENCY: CONTINUOUS

## 2025-06-11 NOTE — PLAN OF CARE
Problem: Chronic Conditions and Co-morbidities  Goal: Patient's chronic conditions and co-morbidity symptoms are monitored and maintained or improved  Outcome: Progressing     Problem: Discharge Planning  Goal: Discharge to home or other facility with appropriate resources  Outcome: Progressing   Pt involved in discharge planning. Barriers to discharge discussed with patient. Discharge learning needs identified. Discuss with patient any additional needed resources and transportation plans. Case management following plan of care.    Problem: Pain  Goal: Verbalizes/displays adequate comfort level or baseline comfort level  Outcome: Progressing   Pt pain managed via MAR.     Problem: Skin/Tissue Integrity  Goal: Skin integrity remains intact  Description: 1.  Monitor for areas of redness and/or skin breakdown2.  Assess vascular access sites hourly3.  Every 4-6 hours minimum:  Change oxygen saturation probe site4.  Every 4-6 hours:  If on nasal continuous positive airway pressure, respiratory therapy assess nares and determine need for appliance change or resting period  Outcome: Progressing

## 2025-06-11 NOTE — PROGRESS NOTES
Patient is A&Ox 4. Pt got dialysis treatment today. VSS this shift with exception of elevated BP. Patient has endorsed pain to lower abdomen/ back managed well per MAR and non-pharm measures. Patient is tolerating PO diet. Pt refuses turns. Voiding via external catheter. Patient updated on plan of care. Fall and safety precautions in place, call light within reach.

## 2025-06-11 NOTE — PROGRESS NOTES
Vascular Surgery Daily Progress Note  Patient: Florida Saleem    CC:    Subjective :  No acute events overnight.    ROS: A 14 point review of systems was conducted, significant findings as noted above. All other systems negative.    Objective :   Infusions:   sodium chloride          I/O:I/O last 3 completed shifts:  In: 640 [P.O.:640]  Out: 0            Wt Readings from Last 1 Encounters:   06/10/25 113.6 kg (250 lb 6.4 oz)       Exam:BP (!) 150/78   Pulse 57   Temp 98.2 °F (36.8 °C) (Oral)   Resp 16   Ht 1.702 m (5' 7\")   Wt 113.6 kg (250 lb 6.4 oz)   SpO2 98%   BMI 39.22 kg/m²     General appearance: alert, in no apparent distress   Lungs: clear to auscultation bilaterally; no crackles, no rhonchi, no wheezes, no rubs  Heart: regular rate and rhythm; S1, S2 normal; no murmurs, no rubs, no gallops  Abdomen: soft, non-tender, non-distended; no guarding, no rigidity  Extremities: no open wounds or acute signs of infection    Pulses    PT DP   R +/+ +/+   L +/+ +/+    +, -/+ ,-/-       LABS:   Recent Labs     06/10/25  0900 06/11/25  0805   WBC 11.8* 11.2*   HGB 11.6* 12.6   HCT 34.7* 38.5   MCV 92.7 91.8    see below        Recent Labs     06/10/25  0900 06/11/25  0805   * 128*   K 4.7 5.9*   CL 94* 93*   CO2 19* 18*   PHOS  --  5.6*   BUN 56* 65*   CREATININE 9.9* 11.3*        Recent Labs     06/10/25  0900   AST 21   ALT 25   BILITOT 0.3   ALKPHOS 87      No results for input(s): \"LIPASE\", \"AMYLASE\" in the last 72 hours.   No results for input(s): \"INR\", \"APTT\" in the last 72 hours.    Invalid input(s): \"PROT\"   No results for input(s): \"CKTOTAL\", \"CKMB\", \"CKMBINDEX\", \"TROPONINI\" in the last 72 hours.    ASSESSMENT/PLAN: Pt. is a 69 y.o. female  with Hx of HTN, HLD, DM, COPD, ESRD on hemodialysis (Tu/Th/Sat), parathyroidectomy (8/2024), appendectomy, cholecystectomy, chronic abdominal pain, and AAA s/p endovascular repair (9/2023) c/b presumed endoleak who presented to the hospital from

## 2025-06-11 NOTE — PLAN OF CARE
Problem: Pain  Goal: Verbalizes/displays adequate comfort level or baseline comfort level  6/11/2025 0740 by India Lieberman RN  Outcome: Progressing  Note: Pt encouraged to monitor pain and request assistance. Appropriate pain scale is being used.      Problem: Skin/Tissue Integrity  Goal: Skin integrity remains intact  Description: 1.  Monitor for areas of redness and/or skin breakdown2.  Assess vascular access sites hourly3.  Every 4-6 hours minimum:  Change oxygen saturation probe site4.  Every 4-6 hours:  If on nasal continuous positive airway pressure, respiratory therapy assess nares and determine need for appliance change or resting period  6/11/2025 0740 by India Lieberman, RN  Outcome: Progressing  Note: Skin assessed frequently. Turning and repositioning implemented.     Problem: Safety - Adult  Goal: Free from fall injury  6/11/2025 0740 by India Lieberman, RN  Outcome: Progressing  Note: Pt free of fall injury this shift. All proper safety precautions are in place. Call light is within reach. Bed alarm is on.

## 2025-06-11 NOTE — CARE COORDINATION
Case Management Assessment  Initial Evaluation    Date/Time of Evaluation: 6/11/2025 2:16 PM  Assessment Completed by: GABRIEL Parr    If patient is discharged prior to next notation, then this note serves as note for discharge by case management.    Patient Name: [place] Saleem                   YOB: 1955  Diagnosis: Atypical chest pain [R07.89]  Complicated UTI (urinary tract infection) [N39.0]  UTI due to extended-spectrum beta lactamase (ESBL) producing Escherichia coli [N39.0, B96.29, Z16.12]                   Date / Time: 6/10/2025  8:32 AM    Patient Admission Status: Inpatient   Readmission Risk (Low < 19, Mod (19-27), High > 27): Readmission Risk Score: 21.4    Current PCP: Kim Liu MD  PCP verified by CM? Yes    Chart Reviewed: Yes      History Provided by: Patient, Medical Record  Patient Orientation: Alert and Oriented    Patient Cognition: Alert    Hospitalization in the last 30 days (Readmission):  No    If yes, Readmission Assessment in CM Navigator will be completed.    Advance Directives:      Code Status: Full Code   Patient's Primary Decision Maker is: Named in Scanned ACP Document    Primary Decision Maker: Dedra Loaiza - Fanny - [phone]    Discharge Planning:    Patient lives with: Other (Comment) (LT residents) Type of Home: Long-Term Care  Primary Care Giver: Other (Comment) ([facility] staff)  Patient Support Systems include: Children, Family Members   Current Financial resources: Medicaid  Current community resources: ECF/Home Care, Chemical Treatment ([facility]. HD TTS.)  Current services prior to admission: Durable Medical Equipment, Extended Care Facility, Other (Comment) ([facility]. HD TTS.)            Current DME: Wheelchair            Type of Home Care services:  None    ADLS  Prior functional level: Assistance with the following:, Bathing, Dressing, Toileting, Cooking, Housework, Shopping, Mobility  Current functional level:  Clinical Pharmacy Progress Note    Vancomycin - Management by Pharmacy    Consult Date(s): 12/26/21  Consulting Provider(s): Liv    Assessment / Plan  1) Staph aureus UTI - Vancomycin   Concurrent Antimicrobials: none   Day of Vanc Therapy: day #8   Current Dosing Method: Bayesian-Guided AUC Dosing  o Therapeutic Goal: AUC < 500 mg/L*hr    o Renal function stable, will increase dose [id number] Q12 hours as current dose predicts low AUC. New predicted AUC 462mg/L*hr and trough of 12.5mg/L.  o Will check a level on Tuesday (not ordered).  Will continue to monitor clinical condition and make adjustments to regimen as appropriate.  o Repeat urine culture NGTD. Blood cultures ordered yesterday are NGTD, PS sent to Dr. Porfirio Olivo. Thanks for consulting pharmacy! Marcin Barney, PharmD  PGY-1 Pharmacy Resident  [id number]  1/2/2022 2:16 PM      Interval update:  Repeat blood cultures and urine culture NGTD. CABG tomorrow. Subjective/Objective:  Ericka Hammonds is a 72 y.o. male with a PMHx significant for COPD, HLD, BPH, and shingles who presented with syncope and chest pressure. Patient found to have NSTEMI, acute blood loss anemia likely 2/2 GI bleed, and staph aureus UTI. Pharmacy is consulted to dose vancomycin. Ht Readings from Last 1 Encounters:   12/24/21 5' 7\" (1.702 m)       Wt Readings from Last 1 Encounters:   12/24/21 168 lb 10.4 oz (76.5 kg)       Current & Prior Antimicrobial Regimen(s):    (12/25-12/26)   Vancomycin - ptd  o 750 mg IV q12h (12/26 - 12/31)  o 1000mg IV q12h (12/31-current)     Vancomycin Level(s) / Doses  Date Time Dose Level / Type of Level Interpretation   12/27 11:39 750 mg IV q12h Random = 14.3 mg/L Predicted AUC = 410 with trough 13.1.   12/31 06:53 750mg IV q12h Random = 7.0 mg/L Predicted AUC = 278 with trough 7.4   Note: Serum levels collected for AUC-based dosing may be high if collected in close proximity to the dose administered.  This is not necessarily an indicator of toxicity. Cultures & Sensitivities:  Date Site Micro Susceptibility / Result   12/26 Urine Staph aureus  Sensitive to Cefazolin, Oxacillin, Bactrim   12/26 Rapid COVID-19 negative    12/30 MRSA Nasal PCR Postive    12/30 Urine NGTD    1/1 Blood  x2 NGTD                  Labs / Ancillary Data:    Estimated Creatinine Clearance: 106 mL/min (A) (based on SCr of 0.6 mg/dL (L)).     Recent Labs     12/31/21  0653 01/01/22  0753   CREATININE 0.6* 0.6*   BUN 19 16   WBC 6.8 6.3 for Transition of Care is related to the following treatment goals of Atypical chest pain [R07.89]  Complicated UTI (urinary tract infection) [N39.0]  UTI due to extended-spectrum beta lactamase (ESBL) producing Escherichia coli [N39.0, B96.29, Z16.12]    IF APPLICABLE: The Patient and/or patient representative [place] and her family were provided with a choice of provider and agrees with the discharge plan. Freedom of choice list with basic dialogue that supports the patient's individualized plan of care/goals and shares the quality data associated with the providers was provided to:     Patient Representative Name:       The Patient and/or Patient Representative Agree with the Discharge Plan?      GABRIEL Parr  Case Management Department  Phone: [phone]  Fax: [phone]

## 2025-06-11 NOTE — PROGRESS NOTES
VSS on RA, afebrile. Alert and oriented x4. Pain treated with one time medication, relief noted. Pt voiding via external catheter, no BM this shift. Tolerating PO meds and diet. No acute events overnight. All fall & safety precautions in place. Pt shows no signs of distress at this time. Call light within reach. Continue current plan of care.

## 2025-06-11 NOTE — CONSULTS
Nephrology Consult Note                                                                                                                                                                                                                                                                                                                                                               Office : [phone]     Fax :[phone]    Patient's Name: [place] Saleem  6/11/2025    Reason for Consult:  ESRD on HD   Requesting Physician:  Kim Liu MD  Chief Complaint:    Chief Complaint   Patient presents with    Shortness of Breath     Pt presents to ED from dialysis d/t low heart rate and c/o SOB. Pt did not receive HD tx today. Reports that her eyes and nose have been burning d/t the air at facility she lives. Pt c/o dizziness and chest tightness.       Assessment/Plan     # ESRD on HD   - Dialyzes every TTS as an outpatient  - Missed HD on Tuesday  - Plan for HD today   - Renally dose meds  - Monitor renal labs     # HTN  - BP's currently acceptable  - On Metoprolol & Nifedipine  - Monitor     # Anemia of chronic disease   - Hgb at goal for ESRD  - Monitor     # Acid- base/ Electrolyte imbalance   - Plan for HD today, as above  - Low K diet     # MBD management  # H/o parathyroidectomy  - Sevelamer with meals  - Low phos diet   - Calcitriol 1 mcg BID     # Abdominal pain  # H/o AAA s/p repair  - CTA ordered  - Vascular surgery on board ; no acute intervention at this time    # Sinus bradycardia  # PAF  - On BB with holding parameters. CCB on hold  - On Amiodarone    # Possible UTI  # H/o ESBL UTI  - Abx management per primary        History of Present Ilness:    [place] Saleem is a 69 y.o. female with a PMH of ESRD on HD, HTN, HLD, PCKD, DM2, AAA s/p repair, who presented to the ER with multiple complaints. Reports SOB, cough, and wheezing. She also reported dysuria, CP, and epigastric pain. She went to outpatient HD

## 2025-06-11 NOTE — CONSULTS
Vascular Surgery   Resident Consult Note    Reason for Consult:     History of Present Illness:   [place] Saleem is a 69 y.o. female with Hx of HTN, HLD, DM, COPD, ESRD on hemodialysis (Tu/Th/Sat), parathyroidectomy (8/2024), appendectomy, cholecystectomy, chronic abdominal pain, and AAA s/p endovascular repair (9/2023) c/b presumed endoleak who presented to the hospital today from dialysis with hypotension and several concerns about her nursing home. She also endorsed generalized pain to her abdomen, back, and legs. The patient is wheelchair bound at baseline.    She has been admitted several times over the last year with UTI secondary to ESBL.     Patient does not recall having vascular surgery done. She does appear confused.     Vascular Surgery consulted for assessment of patient endoleak in setting of abdominal pain. When assessing patient abdomen, patient endorsing back and leg pain. No pain with deep palpation of the abdomin.    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     Rash     Uterine prolapse     Venous stasis        Past Surgical History:        Procedure Laterality Date    ABDOMINAL AORTIC ANEURYSM REPAIR, ENDOVASCULAR N/A 09/08/2023    ABDOMINAL AORTIC ANEURYSM REPAIR ENDOVASCULAR performed by Sofiya Alonzo MD at [facility] OR    APPENDECTOMY      CHOLECYSTECTOMY      COLONOSCOPY N/A 11/5/2024    COLONOSCOPY BIOPSY/ STOOL ASPIRATE performed by Laura Roberson MD at [facility] ENDOSCOPY    IR TUNNELED CVC PLACE WO SQ PORT/PUMP > 5 YEARS  06/01/2021    IR TUNNELED CATHETER PLACEMENT GREATER THAN 5 YEARS 6/1/2021 [facility] SPECIAL PROCEDURES     injection 4 mg, Q6H PRN  polyethylene glycol (GLYCOLAX) packet 17 g, Daily PRN  acetaminophen (TYLENOL) tablet 650 mg, Q6H PRN   Or  acetaminophen (TYLENOL) suppository 650 mg, Q6H PRN  ipratropium 0.5 mg-albuterol 2.5 mg (DUONEB) nebulizer solution 1 Dose, 4x Daily RT  [START ON 6/11/2025] pantoprazole (PROTONIX) tablet 40 mg, BID AC  [START ON 6/11/2025] meropenem (MERREM) 500 mg in sodium chloride 0.9 % 100 mL IVPB (addEASE), Q24H        Family History:   Family History   Problem Relation Age of Onset    Unknown Mother     Unknown Father        Social History:   TOBACCO:   reports that she has been smoking cigarettes. She has a 25 pack-year smoking history. She has never used smokeless tobacco.  ETOH:   reports that she does not currently use alcohol.  DRUGS:   reports no history of drug use.    Review of Systems:   A 14 point review of systems was conducted, significant findings as noted in HPI. All other systems negative.     Physical exam:    Vitals:    06/10/25 1700 06/10/25 1730 06/10/25 1905 06/10/25 2030   BP: (!) 161/71 128/85 (!) 158/78 (!) 152/89   Pulse: 63 63 68 62   Resp:   18 16   Temp:   97.6 °F (36.4 °C) 98.4 °F (36.9 °C)   TempSrc:   Oral Oral   SpO2:   97% 98%   Weight:       Height:           General appearance: drowsy   Eyes: No scleral icterus, EOM grossly intact  Neck: trachea midline, no JVD, no lymphadenopathy, neck supple  Chest/Lungs: CTAB, no crackles/rales, wheezes/rhonchi, normal effort with no accessory muscle use on RA  Cardiovascular: RR  Abdomen: soft, minimally tender, non peritoneal   Skin: warm and dry, no rashes  Extremities: palpable DP, PT.   Neuro: A&Ox3, no focal deficits, sensation intact    Labs:    CBC:   Recent Labs     06/10/25  0900   WBC 11.8*   HGB 11.6*   HCT 34.7*   MCV 92.7        BMP:   Recent Labs     06/10/25  0900   *   K 4.7   CL 94*   CO2 19*   BUN 56*   CREATININE 9.9*     PT/INR: No results for input(s): \"PROTIME\", \"INR\" in the last 72

## 2025-06-11 NOTE — PROGRESS NOTES
thrombotic calcification. Bilateral common iliac segments are patent. Bilateral external iliac arteries are patent. Bilateral hypogastric arteries are patent. LIVER: Normal size and density. No focal abnormality. GALLBLADDER AND BILE DUCTS: Status post cholecystectomy. PANCREAS: Normal. SPLEEN: Normal. ADRENAL GLANDS: Normal. KIDNEYS: Right kidney demonstrates small size. Cortical hypodense lesions favor simple cysts. No evidence of hydronephrosis. Large left renal anterior cystic lesion with multiple wall calcifications identified. Dimensions currently measure 12.8 x 12.6 cm. Finding appears similar to the previous examination. BOWEL: Stomach and duodenum appear normal in configuration. Small intestinal loops are normal in diameter. The colon is normal in configuration. Moderate colonic fecal retention identified. No pathologic wall thickening. No evidence of intestinal obstruction. The appendix is not confidently visualized. PERITONEUM/RETROPERITONEUM: No ascites. No free air. LYMPH NODES: No adenopathy. ABDOMINAL WALL: Normal. URINARY BLADDER: Normal. REPRODUCTIVE ORGANS: Uterus appears normal in size and density. BONES: No acute abnormalities.     1. Patent aortic endograft. No current evidence of endoleak. 2. Abdominal aortic aneurysm currently measures up to 6.1 cm diameter, mildly enlarged from previous study. 3. Large left renal complex cyst. Imaging characteristics are most consistent with Bosniak 2F renal cyst. Electronically signed by Dhruv Kinney      CBC:   Recent Labs     06/10/25  0900 06/11/25  0805   WBC 11.8* 11.2*   HGB 11.6* 12.6    see below     BMP:    Recent Labs     06/10/25  0900 06/11/25  0805   * 128*   K 4.7 5.9*   CL 94* 93*   CO2 19* 18*   BUN 56* 65*   CREATININE 9.9* 11.3*   GLUCOSE 130* 89     Hepatic:   Recent Labs     06/10/25  0900   AST 21   ALT 25   BILITOT 0.3   ALKPHOS 87     Lipids:   Lab Results   Component Value Date/Time    CHOL 170 06/10/2019 12:12 PM    HDL  40 06/10/2019 12:12 PM    TRIG 132 06/10/2019 12:12 PM     Hemoglobin A1C:   Lab Results   Component Value Date/Time    LABA1C 4.8 11/04/2024 02:36 AM     TSH:   Lab Results   Component Value Date/Time    TSH 0.78 07/25/2020 10:59 AM     Troponin: No results found for: \"TROPONINT\"  Lactic Acid: No results for input(s): \"LACTA\" in the last 72 hours.  BNP:   Recent Labs     06/10/25  0900   PROBNP 7,856*     UA:  Lab Results   Component Value Date/Time    NITRU Negative 06/10/2025 02:08 PM    COLORU Yellow 06/10/2025 02:08 PM    PHUR 6.0 06/10/2025 02:08 PM    PHUR 8.0 09/08/2023 08:15 AM    WBCUA  06/10/2025 02:08 PM    RBCUA 3-4 06/10/2025 02:08 PM    MUCUS 2+ 05/29/2021 06:00 AM    BACTERIA 4+ 06/10/2025 02:08 PM    CLARITYU SL CLOUDY 06/10/2025 02:08 PM    LEUKOCYTESUR LARGE 06/10/2025 02:08 PM    UROBILINOGEN 0.2 06/10/2025 02:08 PM    BILIRUBINUR Negative 06/10/2025 02:08 PM    BLOODU SMALL 06/10/2025 02:08 PM    GLUCOSEU Negative 06/10/2025 02:08 PM    KETUA Negative 06/10/2025 02:08 PM    AMORPHOUS 1+ 07/24/2020 11:24 PM     Urine Cultures:   Lab Results   Component Value Date/Time    LABURIN  05/02/2025 06:00 PM     <50,000 CFU/ml mixed skin/urogenital betsy. No further workup    LABURIN  05/02/2025 06:00 PM     >100,000 CFU/ml  CONTACT PRECAUTIONS INDICATED  Carbapenem antibiotics are the best option for infections caused  by ESBL producing organisms.  Other antibiotic classes are  likely to result in treatment failure, even for organisms  demonstrating in vitro susceptibility.       Blood Cultures:   Lab Results   Component Value Date/Time    BC No Growth after 4 days of incubation. 07/25/2021 08:54 PM     Lab Results   Component Value Date/Time    BLOODCULT2 No Growth after 4 days of incubation. 07/25/2021 08:54 PM     Organism:   Lab Results   Component Value Date/Time    ORG Escherichia coli ESBL 05/02/2025 06:00 PM         Electronically signed by Sukhdeep Crespo MD on 6/11/2025 at 10:37 AM

## 2025-06-12 LAB
ALBUMIN SERPL-MCNC: 3.6 G/DL (ref 3.4–5)
ALBUMIN SERPL-MCNC: 3.6 G/DL (ref 3.4–5)
ANION GAP SERPL CALCULATED.3IONS-SCNC: 17 MMOL/L (ref 3–16)
ANION GAP SERPL CALCULATED.3IONS-SCNC: 19 MMOL/L (ref 3–16)
BACTERIA UR CULT: ABNORMAL
BACTERIA UR CULT: ABNORMAL
BASOPHILS # BLD: 0.1 K/UL (ref 0–0.2)
BASOPHILS NFR BLD: 0.7 %
BUN SERPL-MCNC: 55 MG/DL (ref 7–20)
BUN SERPL-MCNC: 60 MG/DL (ref 7–20)
CALCIUM SERPL-MCNC: 8.1 MG/DL (ref 8.3–10.6)
CALCIUM SERPL-MCNC: 8.1 MG/DL (ref 8.3–10.6)
CHLORIDE SERPL-SCNC: 93 MMOL/L (ref 99–110)
CHLORIDE SERPL-SCNC: 94 MMOL/L (ref 99–110)
CO2 SERPL-SCNC: 15 MMOL/L (ref 21–32)
CO2 SERPL-SCNC: 19 MMOL/L (ref 21–32)
CREAT SERPL-MCNC: 10.5 MG/DL (ref 0.6–1.2)
CREAT SERPL-MCNC: 9.9 MG/DL (ref 0.6–1.2)
DEPRECATED RDW RBC AUTO: 21.4 % (ref 12.4–15.4)
EKG ATRIAL RATE: 45 BPM
EKG DIAGNOSIS: NORMAL
EKG P AXIS: 105 DEGREES
EKG P-R INTERVAL: 248 MS
EKG Q-T INTERVAL: 576 MS
EKG QRS DURATION: 100 MS
EKG QTC CALCULATION (BAZETT): 498 MS
EKG R AXIS: 28 DEGREES
EKG T AXIS: 76 DEGREES
EKG VENTRICULAR RATE: 45 BPM
EOSINOPHIL # BLD: 0.7 K/UL (ref 0–0.6)
EOSINOPHIL NFR BLD: 7.2 %
GFR SERPLBLD CREATININE-BSD FMLA CKD-EPI: 4 ML/MIN/{1.73_M2}
GFR SERPLBLD CREATININE-BSD FMLA CKD-EPI: 4 ML/MIN/{1.73_M2}
GLUCOSE SERPL-MCNC: 111 MG/DL (ref 70–99)
GLUCOSE SERPL-MCNC: 92 MG/DL (ref 70–99)
HCT VFR BLD AUTO: 36.1 % (ref 36–48)
HGB BLD-MCNC: 12.1 G/DL (ref 12–16)
INR PPP: 1.02 (ref 0.86–1.14)
LYMPHOCYTES # BLD: 1.4 K/UL (ref 1–5.1)
LYMPHOCYTES NFR BLD: 14.3 %
MAGNESIUM SERPL-MCNC: 2.55 MG/DL (ref 1.8–2.4)
MCH RBC QN AUTO: 31.5 PG (ref 26–34)
MCHC RBC AUTO-ENTMCNC: 33.7 G/DL (ref 31–36)
MCV RBC AUTO: 93.5 FL (ref 80–100)
MONOCYTES # BLD: 0.8 K/UL (ref 0–1.3)
MONOCYTES NFR BLD: 8.2 %
NEUTROPHILS # BLD: 6.8 K/UL (ref 1.7–7.7)
NEUTROPHILS NFR BLD: 69.6 %
ORGANISM: ABNORMAL
PHOSPHATE SERPL-MCNC: 5.3 MG/DL (ref 2.5–4.9)
PHOSPHATE SERPL-MCNC: 5.4 MG/DL (ref 2.5–4.9)
PLATELET # BLD AUTO: 156 K/UL (ref 135–450)
PMV BLD AUTO: 6.9 FL (ref 5–10.5)
POTASSIUM SERPL-SCNC: 5 MMOL/L (ref 3.5–5.1)
POTASSIUM SERPL-SCNC: ABNORMAL MMOL/L (ref 3.5–5.1)
PROTHROMBIN TIME: 13.7 SEC (ref 12.1–14.9)
RBC # BLD AUTO: 3.86 M/UL (ref 4–5.2)
SODIUM SERPL-SCNC: 127 MMOL/L (ref 136–145)
SODIUM SERPL-SCNC: 130 MMOL/L (ref 136–145)
WBC # BLD AUTO: 9.8 K/UL (ref 4–11)

## 2025-06-12 PROCEDURE — 83735 ASSAY OF MAGNESIUM: CPT

## 2025-06-12 PROCEDURE — 6370000000 HC RX 637 (ALT 250 FOR IP): Performed by: HOSPITALIST

## 2025-06-12 PROCEDURE — 6360000002 HC RX W HCPCS: Performed by: HOSPITALIST

## 2025-06-12 PROCEDURE — 2580000003 HC RX 258: Performed by: HOSPITALIST

## 2025-06-12 PROCEDURE — 85025 COMPLETE CBC W/AUTO DIFF WBC: CPT

## 2025-06-12 PROCEDURE — 2500000003 HC RX 250 WO HCPCS: Performed by: HOSPITALIST

## 2025-06-12 PROCEDURE — 80069 RENAL FUNCTION PANEL: CPT

## 2025-06-12 PROCEDURE — 1200000000 HC SEMI PRIVATE

## 2025-06-12 PROCEDURE — 36415 COLL VENOUS BLD VENIPUNCTURE: CPT

## 2025-06-12 PROCEDURE — 99233 SBSQ HOSP IP/OBS HIGH 50: CPT | Performed by: HOSPITALIST

## 2025-06-12 PROCEDURE — 85610 PROTHROMBIN TIME: CPT

## 2025-06-12 RX ORDER — IPRATROPIUM BROMIDE AND ALBUTEROL SULFATE 2.5; .5 MG/3ML; MG/3ML
1 SOLUTION RESPIRATORY (INHALATION) EVERY 4 HOURS PRN
Status: DISCONTINUED | OUTPATIENT
Start: 2025-06-12 | End: 2025-06-20 | Stop reason: HOSPADM

## 2025-06-12 RX ADMIN — SEVELAMER CARBONATE 1600 MG: 800 TABLET, FILM COATED ORAL at 12:33

## 2025-06-12 RX ADMIN — HYDROMORPHONE HYDROCHLORIDE 0.5 MG: 1 INJECTION, SOLUTION INTRAMUSCULAR; INTRAVENOUS; SUBCUTANEOUS at 20:16

## 2025-06-12 RX ADMIN — CALCITRIOL CAPSULES 0.25 MCG 1 MCG: 0.25 CAPSULE ORAL at 07:42

## 2025-06-12 RX ADMIN — TORSEMIDE 40 MG: 20 TABLET ORAL at 07:42

## 2025-06-12 RX ADMIN — NIFEDIPINE 60 MG: 30 TABLET, EXTENDED RELEASE ORAL at 07:42

## 2025-06-12 RX ADMIN — OXYCODONE 5 MG: 5 TABLET ORAL at 22:55

## 2025-06-12 RX ADMIN — AMIODARONE HYDROCHLORIDE 200 MG: 200 TABLET ORAL at 20:12

## 2025-06-12 RX ADMIN — OXYCODONE 5 MG: 5 TABLET ORAL at 06:32

## 2025-06-12 RX ADMIN — MEROPENEM 500 MG: 500 INJECTION INTRAVENOUS at 17:39

## 2025-06-12 RX ADMIN — SODIUM CHLORIDE, PRESERVATIVE FREE 10 ML: 5 INJECTION INTRAVENOUS at 20:18

## 2025-06-12 RX ADMIN — HYDROMORPHONE HYDROCHLORIDE 0.5 MG: 1 INJECTION, SOLUTION INTRAMUSCULAR; INTRAVENOUS; SUBCUTANEOUS at 07:45

## 2025-06-12 RX ADMIN — CETIRIZINE HYDROCHLORIDE 5 MG: 10 TABLET, FILM COATED ORAL at 07:43

## 2025-06-12 RX ADMIN — AMIODARONE HYDROCHLORIDE 200 MG: 200 TABLET ORAL at 07:43

## 2025-06-12 RX ADMIN — SEVELAMER CARBONATE 1600 MG: 800 TABLET, FILM COATED ORAL at 07:42

## 2025-06-12 RX ADMIN — ATORVASTATIN CALCIUM 40 MG: 40 TABLET, FILM COATED ORAL at 20:11

## 2025-06-12 RX ADMIN — HEPARIN SODIUM 5000 UNITS: 5000 INJECTION INTRAVENOUS; SUBCUTANEOUS at 15:59

## 2025-06-12 RX ADMIN — HEPARIN SODIUM 5000 UNITS: 5000 INJECTION INTRAVENOUS; SUBCUTANEOUS at 20:12

## 2025-06-12 RX ADMIN — OXYCODONE 5 MG: 5 TABLET ORAL at 12:33

## 2025-06-12 RX ADMIN — ASPIRIN 81 MG: 81 TABLET, COATED ORAL at 07:42

## 2025-06-12 RX ADMIN — PANTOPRAZOLE SODIUM 40 MG: 40 TABLET, DELAYED RELEASE ORAL at 06:32

## 2025-06-12 RX ADMIN — SEVELAMER CARBONATE 1600 MG: 800 TABLET, FILM COATED ORAL at 15:59

## 2025-06-12 RX ADMIN — PANTOPRAZOLE SODIUM 40 MG: 40 TABLET, DELAYED RELEASE ORAL at 15:59

## 2025-06-12 RX ADMIN — METOPROLOL SUCCINATE 50 MG: 50 TABLET, EXTENDED RELEASE ORAL at 20:11

## 2025-06-12 RX ADMIN — HEPARIN SODIUM 5000 UNITS: 5000 INJECTION INTRAVENOUS; SUBCUTANEOUS at 06:32

## 2025-06-12 ASSESSMENT — PAIN DESCRIPTION - DESCRIPTORS
DESCRIPTORS: SHARP;ACHING;DISCOMFORT
DESCRIPTORS: ACHING;DISCOMFORT
DESCRIPTORS: DISCOMFORT;SORE
DESCRIPTORS: DISCOMFORT;SORE;ACHING
DESCRIPTORS: SHARP;ACHING;DISCOMFORT

## 2025-06-12 ASSESSMENT — PAIN DESCRIPTION - PAIN TYPE
TYPE: ACUTE PAIN

## 2025-06-12 ASSESSMENT — PAIN DESCRIPTION - ONSET
ONSET: ON-GOING

## 2025-06-12 ASSESSMENT — PAIN DESCRIPTION - ORIENTATION
ORIENTATION: LEFT;LOWER
ORIENTATION: LEFT;LOWER
ORIENTATION: LOWER
ORIENTATION: LOWER
ORIENTATION: POSTERIOR;ANTERIOR

## 2025-06-12 ASSESSMENT — PAIN SCALES - GENERAL
PAINLEVEL_OUTOF10: 10
PAINLEVEL_OUTOF10: 8
PAINLEVEL_OUTOF10: 7
PAINLEVEL_OUTOF10: 5
PAINLEVEL_OUTOF10: 6
PAINLEVEL_OUTOF10: 5
PAINLEVEL_OUTOF10: 5
PAINLEVEL_OUTOF10: 2
PAINLEVEL_OUTOF10: 9
PAINLEVEL_OUTOF10: 2

## 2025-06-12 ASSESSMENT — PAIN - FUNCTIONAL ASSESSMENT
PAIN_FUNCTIONAL_ASSESSMENT: PREVENTS OR INTERFERES SOME ACTIVE ACTIVITIES AND ADLS
PAIN_FUNCTIONAL_ASSESSMENT: PREVENTS OR INTERFERES SOME ACTIVE ACTIVITIES AND ADLS
PAIN_FUNCTIONAL_ASSESSMENT: ACTIVITIES ARE NOT PREVENTED
PAIN_FUNCTIONAL_ASSESSMENT: PREVENTS OR INTERFERES SOME ACTIVE ACTIVITIES AND ADLS
PAIN_FUNCTIONAL_ASSESSMENT: ACTIVITIES ARE NOT PREVENTED

## 2025-06-12 ASSESSMENT — PAIN DESCRIPTION - LOCATION
LOCATION: ABDOMEN;BACK
LOCATION: LEG;BACK;ABDOMEN
LOCATION: ABDOMEN;BACK
LOCATION: BACK;ABDOMEN
LOCATION: BACK;ABDOMEN

## 2025-06-12 ASSESSMENT — PAIN DESCRIPTION - FREQUENCY
FREQUENCY: CONTINUOUS

## 2025-06-12 NOTE — PLAN OF CARE
Problem: Pain  Goal: Verbalizes/displays adequate comfort level or baseline comfort level  6/12/2025 0952 by India Lieberman RN  Outcome: Progressing  Note: Pt encouraged to monitor pain and request assistance. Appropriate pain scale is being used.     Problem: Skin/Tissue Integrity  Goal: Skin integrity remains intact  Description: 1.  Monitor for areas of redness and/or skin breakdown2.  Assess vascular access sites hourly3.  Every 4-6 hours minimum:  Change oxygen saturation probe site4.  Every 4-6 hours:  If on nasal continuous positive airway pressure, respiratory therapy assess nares and determine need for appliance change or resting period  6/12/2025 0952 by India Lieberman, RN  Outcome: Progressing  Note: Skin assessed frequently. Refuses turns/ boosts at times.      Problem: Safety - Adult  Goal: Free from fall injury  6/12/2025 0952 by India Lieberman, RN  Outcome: Progressing  Note: Pt free of fall injury this shift. All proper safety precautions are in place. Call light is within reach. Bed alarm is on.

## 2025-06-12 NOTE — CONSULTS
Nephrology Progress Note                                                                                                                                                                                                                                                                                                                                                               Office : [phone]     Fax :[phone]    Patient's Name: [place] Saleem  6/12/2025    Reason for Consult:  ESRD on HD   Requesting Physician:  Kim Liu MD  Chief Complaint:    Chief Complaint   Patient presents with    Shortness of Breath     Pt presents to ED from dialysis d/t low heart rate and c/o SOB. Pt did not receive HD tx today. Reports that her eyes and nose have been burning d/t the air at facility she lives. Pt c/o dizziness and chest tightness.       Assessment/Plan     # ESRD on HD   - Dialyzes every TTS as an outpatient  - Missed HD on Tuesday  - S/p HD yesterday.   - Anticipate next HD tomorrow  - Continue Torsemide as patient still urinates   - Renally dose meds  - Monitor renal labs     # HTN  - BP's currently acceptable  - On Metoprolol & Nifedipine  - Monitor     # Anemia of chronic disease   - Hgb at goal for ESRD  - Monitor     # Acid- base/ Electrolyte imbalance   - Plan for HD today, as above  - Low K diet     # MBD management  # H/o parathyroidectomy  - Sevelamer with meals  - Low phos diet   - Calcitriol 1 mcg BID     # Abdominal pain  # Left complex renal cyst   # H/o AAA s/p repair  - CTA - evidence of endo leak. Will ultimately need ablation   - Vascular surgery on board ; no acute intervention at this time  - Urology consulted to further evaluate cyst     # Sinus bradycardia  # PAF  - On BB with holding parameters. CCB on hold  - On Amiodarone    # Possible UTI  # H/o ESBL UTI  - Abx management per primary        History of Present Ilness:    [place] Saleem is a 69 y.o. female with a PMH of ESRD on HD,  lower extremity edema Yes (ankles)   Psychiatric: mood and affect flat  Musculoskeletal:  Rom, muscular strength intact    Data:   Labs:  CBC:   Recent Labs     06/10/25  0900 06/11/25  0805 06/12/25  0732   WBC 11.8* 11.2* 9.8   HGB 11.6* 12.6 12.1    see below 156     BMP:    Recent Labs     06/10/25  0900 06/11/25  0805 06/12/25  0732   * 128* 127*   K 4.7 5.9* see below   CL 94* 93* 93*   CO2 19* 18* 15*   BUN 56* 65* 55*   CREATININE 9.9* 11.3* 9.9*   GLUCOSE 130* 89 92     Ca/Mg/Phos:   Recent Labs     06/10/25  0900 06/11/25  0805 06/12/25  0732   CALCIUM 8.5 8.3 8.1*   MG  --   --  2.55*   PHOS  --  5.6* 5.3*     Hepatic:   Recent Labs     06/10/25  0900   AST 21   ALT 25   BILITOT 0.3   ALKPHOS 87     Troponin: No results for input(s): \"TROPONINI\" in the last 72 hours.  BNP: No results for input(s): \"BNP\" in the last 72 hours.  Lipids: No results for input(s): \"CHOL\", \"TRIG\", \"HDL\" in the last 72 hours.    Invalid input(s): \"LDLCALC\", \"LABVLDL\"  ABGs: No results for input(s): \"PHART\", \"PO2ART\", \"KUL0WUR\" in the last 72 hours.  INR: No results for input(s): \"INR\" in the last 72 hours.  UA:  Recent Labs     06/10/25  1408   COLORU Yellow   CLARITYU SL CLOUDY*   GLUCOSEU Negative   BILIRUBINUR Negative   KETUA Negative   BLOODU SMALL*   PHUR 6.0   PROTEINU 100*   UROBILINOGEN 0.2   NITRU Negative   LEUKOCYTESUR LARGE*   URINETYPE NotGiven      Urine Microscopic:   Recent Labs     06/10/25  1408   BACTERIA 4+*   WBCUA *   RBCUA 3-4     Urine Culture:   Recent Labs     06/10/25  1407   LABURIN <50,000 CFU/ml mixed skin/urogenital betsy. No further workup*  >100,000 CFU/ml  CONTACT PRECAUTIONS INDICATED  Carbapenem antibiotics are the best option for infections caused  by ESBL producing organisms.  Other antibiotic classes are  likely to result in treatment failure, even for organisms  demonstrating in vitro susceptibility.  *     Urine Chemistry: No results for input(s): \"CLUR\", \"LABCREA\",

## 2025-06-12 NOTE — CARE COORDINATION
3:45 PM  MARÍA ELENA received VM from Lissa @ [facility]  stating that pt is a paid bedhold and can return at any time with no barriers.

## 2025-06-12 NOTE — CONSULTS
Urology Attending Consult Note      Reason for Consultation: left renal cyst    History: 69 yr old female with history of ESRD on hemodialysis, HTN, polycystic kidney diease, DMII who presented from nursing home with multiple complaints. She complained of SOB, cough, wheezing, chest tightness, dysuria, epigastric pain, and LLQ pain. Patient currently being treated for ESBL E Coli UTI on IV Merrem. Patient has history of left renal cyst known since , reviewed records and patient had aspiration of cyst at  in 2018 with IR Dr. Dariusz Pak, 650mL was removed; no evidence of malignancy for the fluid that was aspirated. Patient last had this cyst drained in 2020. Patient known to urology group, last saw Dr. Varela 3/2025;     Last office note: \"We asked IR to drain the left renal cyst, but she could not tolerate and only 20ml was present. the cyst has been present for many years and is stable. Seen by Dr. Sims 22. She was not deemed to be a good surgical candidate for nephrectomy or cyst decortication. Has been having worsening L flank/abdominal pain in . She had a CT AP done at  on 23 that showed the cyst to be 13.0 x 12.2 cm in size which was increased from their prior comparison scan in  and showed some wall thickening/enhancement. She was given Abx that improved Sx.   She feels like the cyst is getting bigger. Causing pain with bending and twisting. Pain also increased after dialysis.\"        Family History, Social History, Review of Systems:  Reviewed and agreed to as per chart    Vitals:  /75   Pulse 63   Temp 98 °F (36.7 °C) (Oral)   Resp 16   Ht 1.702 m (5' 7\")   Wt 112 kg (246 lb 14.6 oz)   SpO2 96%   BMI 38.67 kg/m²   Temp  Av.1 °F (36.7 °C)  Min: 97.9 °F (36.6 °C)  Max: 98.4 °F (36.9 °C)    Intake/Output Summary (Last 24 hours) at 2025 0821  Last data filed at 2025 0600  Gross per 24 hour   Intake 1000 ml   Output 1250 ml   Net -250 ml

## 2025-06-12 NOTE — PLAN OF CARE
Problem: Chronic Conditions and Co-morbidities  Goal: Patient's chronic conditions and co-morbidity symptoms are monitored and maintained or improved  Outcome: Progressing     Problem: Discharge Planning  Goal: Discharge to home or other facility with appropriate resources  Outcome: Progressing   Pt involved in discharge planning. Barriers to discharge discussed with patient. Discharge learning needs identified. Discuss with patient any additional needed resources and transportation plans. Case management following plan of care.    Problem: Pain  Goal: Verbalizes/displays adequate comfort level or baseline comfort level  Outcome: Progressing   Pt pain managed via MAR.     Problem: Skin/Tissue Integrity  Goal: Skin integrity remains intact  Description: 1.  Monitor for areas of redness and/or skin breakdown2.  Assess vascular access sites hourly3.  Every 4-6 hours minimum:  Change oxygen saturation probe site4.  Every 4-6 hours:  If on nasal continuous positive airway pressure, respiratory therapy assess nares and determine need for appliance change or resting period  Outcome: Progressing     Problem: Safety - Adult  Goal: Free from fall injury  Outcome: Progressing   Note: Pt educated on safety measures. Standard safety measures in place, bed in lowest position, bed locked and alarm on,  socks applied, call light and table in reach.    Problem: ABCDS Injury Assessment  Goal: Absence of physical injury  Outcome: Progressing  Flowsheets (Taken 6/12/2025 0050)  Absence of Physical Injury: Implement safety measures based on patient assessment

## 2025-06-12 NOTE — PROGRESS NOTES
VSS on RA, afebrile. Alert and oriented x4. Pain treated with PRN medication, relief noted. No acute events overnight. Pt did not ambulate this shift, voiding via external catheter. All fall & safety precautions in place. Pt refusing 4am vitals, states no other needs at this time. Call light within reach. Continue current plan of care.

## 2025-06-12 NOTE — PROGRESS NOTES
Treatment time: 3 hours  Net UF: 1000 ml     Pre weight: 113 kg  Post weight:112 kg       Access used: RTDC    Access function: well with  ml/min     Medications or blood products given: n/a     Regular outpatient schedule: MW     Summary of response to treatment: Patient tolerated treatment well and without any complications. Patient remained stable throughout entire treatment.

## 2025-06-12 NOTE — PROGRESS NOTES
Clinical Pharmacy Progress Note  Medication History     Admit Date: 6/10/2025    List of current medications patient is taking is complete. Home Medication list in Epic updated to reflect changes noted below.    Source of information: Order summary report from [facility]; list dated 6/10/25    Patient's home pharmacy:   [facility] [id number] - [facility] [address] - P [phone] - F [phone]  [address] [address]  Phone: [phone] Fax: [phone]    [id number] - [place], [place] - [address] - P [phone] - F [phone] [address]  [facility] [address]  Phone: [phone] Fax: [phone]      Changes made to medication list:   Medications removed: (include reason, ex: therapy completed, patient no longer taking, etc.)  Calcitriol - not on facility med list  Calcium carbonate - not on facility med list   Divalproex - not on facility med list  Loratadine - not on facility med list  Mupirocin - not on facility med list  Nortriptyline - not on facility med list  Medication doses adjusted:   Albuterol? q4h PRN wheezing  Cetirizine? 10mg po q6h PRN allergies  Flonase? daily PRN allergies  Senna? 8.6mg BID  Sevelamer? 1600mg po TID    Current Outpatient Medications   Medication Instructions    acetaminophen (TYLENOL) 650 mg, EVERY 6 HOURS PRN    albuterol (PROVENTIL) 2.5 mg, Nebulization, EVERY 4 HOURS PRN    amiodarone (CORDARONE) 200 mg, 2 TIMES DAILY    amitriptyline (ELAVIL) 25 mg, NIGHTLY    aspirin 81 mg, Oral, DAILY    atorvastatin (LIPITOR) 40 mg, Nightly    cetirizine (ZYRTEC) 5 mg, Oral, DAILY    dilTIAZem (CARDIZEM CD) 300 mg, Nightly    diphenhydrAMINE (BENADRYL) 25 mg, EVERY 4 HOURS PRN    fluticasone (FLONASE) 50 MCG/ACT nasal spray 1 spray, Each Nostril, DAILY    melatonin 3 mg, Nightly    methocarbamol (ROBAXIN) 500 mg, EVERY 6 HOURS PRN    metoprolol succinate (TOPROL XL) 50 mg, Nightly    Multiple Vitamins-Minerals

## 2025-06-12 NOTE — PROGRESS NOTES
Patient is A&Ox 4. VSS on RA. Patient has endorsed pain to lower back/ abdomen managed well per MAR and non-pharm measures. Patient is tolerating PO diet. Frequent turning/ repositioning- pt refuses at times. Voiding via external catheter. Patient updated on plan of care. Fall and safety precautions in place, call light within reach.

## 2025-06-12 NOTE — PROGRESS NOTES
V2.0    [facility] Progress Note      Name:  Florida Saleem /Age/Sex: 1955  (69 y.o. female)   MRN & CSN:  [id number] & [id number] Encounter Date/Time: 2025 10:37 AM EDT   Location:  [facility]- PCP: Kim Liu MD     Attending:Faheem Richardson,*       Hospital Day: 3    Assessment and Recommendations     Patient Active Problem List   Diagnosis    Gout    ACS (acute coronary syndrome) (HCC)    Acute renal failure    ARF (acute renal failure)    Essential hypertension    Anemia of chronic renal failure, stage 5 (HCC)    Electrolyte imbalance    Secondary hypertension    AMS (altered mental status)    Acute pancreatitis    Adjustment disorder with mixed disturbance of emotions and conduct    Anxiety    Arthritis of knee, right    Delusional ideas (HCC)    Paranoid (HCC)    Disorder of bladder    Encephalopathy    Generalized weakness    Diabetic ulcer of left heel, limited to breakdown of skin (HCC)    Heel ulceration ([facility])    Postmenopausal bleeding    Psychotic disorder ([facility])    Renal cyst    Uterovaginal prolapse, incomplete    Acute-on-chronic kidney injury    Transient alteration of awareness    AAA (abdominal aortic aneurysm) without rupture    ESRD (end stage renal disease) (HCC)    Enlarging abdominal aortic aneurysm (AAA)    Other emphysema (HCC)    Dyslipidemia    Diabetes mellitus type 2, insulin dependent (HCC)    Hyponatremia with excess extracellular fluid volume    Hyperkalemia    Acute colitis    Rectal bleed    Chronic kidney disease-mineral and bone disorder (CKD-MBD)    Anemia of chronic renal failure    Ischemic colitis    Encounter for palliative care    Chronic diastolic congestive heart failure (HCC)    UTI due to extended-spectrum beta lactamase (ESBL) producing Escherichia coli       69-year-old lady who is a nursing home resident/wheelchair-bound known case of end-stage renal disease on hemodialysis, hypertension, hyperlipidemia, gout, polycystic kidney disease, and

## 2025-06-12 NOTE — PROGRESS NOTES
Vascular Surgery Daily Progress Note  Patient: Florida Saleem    CC: Type II endoleak    Subjective :  No acute events overnight.  Abdominal pain unchanged.    ROS: A 14 point review of systems was conducted, significant findings as noted above. All other systems negative.    Objective :   Infusions:   sodium chloride          I/O:I/O last 3 completed shifts:  In: 1440 [P.O.:1440]  Out: 400 [Urine:400]           Wt Readings from Last 1 Encounters:   06/11/25 112 kg (246 lb 14.6 oz)       Exam:/67   Pulse 72   Temp 98.1 °F (36.7 °C) (Oral)   Resp 16   Ht 1.702 m (5' 7\")   Wt 112 kg (246 lb 14.6 oz)   SpO2 96%   BMI 38.67 kg/m²     General appearance: alert, in no apparent distress   Lungs: clear to auscultation bilaterally; no crackles, no rhonchi, no wheezes, no rubs  Heart: regular rate and rhythm; S1, S2 normal; no murmurs, no rubs, no gallops  Abdomen: soft, tender over left upper quadrant , non-distended; no guarding, no rigidity  Extremities: no open wounds or acute signs of infection    Pulses    PT DP   R +/+ +/+   L +/+ +/+    +, -/+ ,-/-       LABS:   Recent Labs     06/10/25  0900 06/11/25  0805   WBC 11.8* 11.2*   HGB 11.6* 12.6   HCT 34.7* 38.5   MCV 92.7 91.8    see below        Recent Labs     06/10/25  0900 06/11/25  0805   * 128*   K 4.7 5.9*   CL 94* 93*   CO2 19* 18*   PHOS  --  5.6*   BUN 56* 65*   CREATININE 9.9* 11.3*        Recent Labs     06/10/25  0900   AST 21   ALT 25   BILITOT 0.3   ALKPHOS 87      No results for input(s): \"LIPASE\", \"AMYLASE\" in the last 72 hours.   No results for input(s): \"INR\", \"APTT\" in the last 72 hours.    Invalid input(s): \"PROT\"   No results for input(s): \"CKTOTAL\", \"CKMB\", \"CKMBINDEX\", \"TROPONINI\" in the last 72 hours.    ASSESSMENT/PLAN: Florida Saleem is a 69 y.o. female  with Hx of HTN, HLD, DM, COPD, ESRD on hemodialysis (Tu/Th/Sat), parathyroidectomy (8/2024), appendectomy, cholecystectomy, chronic abdominal pain, and AAA s/p  endovascular repair (9/2023).  Vascular consulted regarding newly-noted endoleak in setting of aneurysm sac growth (6.1cm from 5.8cm Nov '24).    - Recommend ablation with IR during this hospital stay; this can be arranged non-emergently once the patient's acute medical issues are addressed  - Left renal cyst palpable on physical exam in area of maximal tenderness, consult to urology placed for eval, likely main cause of abdominal pain  - External catheter in place, clear yellow urine output  - Remainder of care per primary    Esequiel MOJGAN Mg   Podiatric Resident PGY-2  Pager [phone] or PerfectServe  6/12/2025, 6:53 AM

## 2025-06-12 NOTE — CONSULTS
IR consulted for type II endoleak. No acute intervention planned at this time, will be worked up for potential outpatient procedure after discharge. IR planning for CT guided aspiration of renal cyst 6/13. Patient NPO for procedure.     Ligia Rosario, APRN - CNP  [phone]

## 2025-06-13 LAB
ALBUMIN SERPL-MCNC: 3.7 G/DL (ref 3.4–5)
ANION GAP SERPL CALCULATED.3IONS-SCNC: 16 MMOL/L (ref 3–16)
BASOPHILS # BLD: 0.1 K/UL (ref 0–0.2)
BASOPHILS NFR BLD: 0.6 %
BUN SERPL-MCNC: 67 MG/DL (ref 7–20)
CALCIUM SERPL-MCNC: 8.3 MG/DL (ref 8.3–10.6)
CHLORIDE SERPL-SCNC: 93 MMOL/L (ref 99–110)
CO2 SERPL-SCNC: 20 MMOL/L (ref 21–32)
CREAT SERPL-MCNC: 11.5 MG/DL (ref 0.6–1.2)
DEPRECATED RDW RBC AUTO: 21 % (ref 12.4–15.4)
EOSINOPHIL # BLD: 0.7 K/UL (ref 0–0.6)
EOSINOPHIL NFR BLD: 7.8 %
GFR SERPLBLD CREATININE-BSD FMLA CKD-EPI: 3 ML/MIN/{1.73_M2}
GLUCOSE SERPL-MCNC: 83 MG/DL (ref 70–99)
HCT VFR BLD AUTO: 35.7 % (ref 36–48)
HGB BLD-MCNC: 12 G/DL (ref 12–16)
INR PPP: 1.02 (ref 0.86–1.14)
LYMPHOCYTES # BLD: 1.2 K/UL (ref 1–5.1)
LYMPHOCYTES NFR BLD: 13 %
MAGNESIUM SERPL-MCNC: 2.73 MG/DL (ref 1.8–2.4)
MCH RBC QN AUTO: 30.7 PG (ref 26–34)
MCHC RBC AUTO-ENTMCNC: 33.5 G/DL (ref 31–36)
MCV RBC AUTO: 91.7 FL (ref 80–100)
MONOCYTES # BLD: 0.8 K/UL (ref 0–1.3)
MONOCYTES NFR BLD: 8.6 %
NEUTROPHILS # BLD: 6.4 K/UL (ref 1.7–7.7)
NEUTROPHILS NFR BLD: 70 %
PHOSPHATE SERPL-MCNC: 6 MG/DL (ref 2.5–4.9)
PLATELET # BLD AUTO: 148 K/UL (ref 135–450)
PMV BLD AUTO: 6.8 FL (ref 5–10.5)
POTASSIUM SERPL-SCNC: 6 MMOL/L (ref 3.5–5.1)
PROTHROMBIN TIME: 13.7 SEC (ref 12.1–14.9)
RBC # BLD AUTO: 3.9 M/UL (ref 4–5.2)
SODIUM SERPL-SCNC: 129 MMOL/L (ref 136–145)
WBC # BLD AUTO: 9.2 K/UL (ref 4–11)

## 2025-06-13 PROCEDURE — 2580000003 HC RX 258: Performed by: HOSPITALIST

## 2025-06-13 PROCEDURE — 83735 ASSAY OF MAGNESIUM: CPT

## 2025-06-13 PROCEDURE — 1200000000 HC SEMI PRIVATE

## 2025-06-13 PROCEDURE — 6360000002 HC RX W HCPCS: Performed by: HOSPITALIST

## 2025-06-13 PROCEDURE — 99233 SBSQ HOSP IP/OBS HIGH 50: CPT | Performed by: HOSPITALIST

## 2025-06-13 PROCEDURE — 36415 COLL VENOUS BLD VENIPUNCTURE: CPT

## 2025-06-13 PROCEDURE — 80069 RENAL FUNCTION PANEL: CPT

## 2025-06-13 PROCEDURE — 6370000000 HC RX 637 (ALT 250 FOR IP): Performed by: HOSPITALIST

## 2025-06-13 PROCEDURE — 6370000000 HC RX 637 (ALT 250 FOR IP): Performed by: NURSE PRACTITIONER

## 2025-06-13 PROCEDURE — 90935 HEMODIALYSIS ONE EVALUATION: CPT

## 2025-06-13 PROCEDURE — 85025 COMPLETE CBC W/AUTO DIFF WBC: CPT

## 2025-06-13 PROCEDURE — 85610 PROTHROMBIN TIME: CPT

## 2025-06-13 RX ORDER — OXYCODONE HYDROCHLORIDE 5 MG/1
2.5 TABLET ORAL
Refills: 0 | Status: DISCONTINUED | OUTPATIENT
Start: 2025-06-13 | End: 2025-06-20 | Stop reason: HOSPADM

## 2025-06-13 RX ORDER — HEPARIN SODIUM 1000 [USP'U]/ML
INJECTION, SOLUTION INTRAVENOUS; SUBCUTANEOUS
Status: DISPENSED
Start: 2025-06-13 | End: 2025-06-13

## 2025-06-13 RX ADMIN — ACETAMINOPHEN 650 MG: 325 TABLET ORAL at 04:46

## 2025-06-13 RX ADMIN — MEROPENEM 500 MG: 500 INJECTION INTRAVENOUS at 16:16

## 2025-06-13 RX ADMIN — TORSEMIDE 40 MG: 20 TABLET ORAL at 07:57

## 2025-06-13 RX ADMIN — OXYCODONE 5 MG: 5 TABLET ORAL at 04:46

## 2025-06-13 RX ADMIN — SEVELAMER CARBONATE 1600 MG: 800 TABLET, FILM COATED ORAL at 12:37

## 2025-06-13 RX ADMIN — CETIRIZINE HYDROCHLORIDE 5 MG: 10 TABLET, FILM COATED ORAL at 07:57

## 2025-06-13 RX ADMIN — OXYCODONE 5 MG: 5 TABLET ORAL at 18:41

## 2025-06-13 RX ADMIN — METOPROLOL SUCCINATE 50 MG: 50 TABLET, EXTENDED RELEASE ORAL at 21:24

## 2025-06-13 RX ADMIN — SEVELAMER CARBONATE 1600 MG: 800 TABLET, FILM COATED ORAL at 07:57

## 2025-06-13 RX ADMIN — ASPIRIN 81 MG: 81 TABLET, COATED ORAL at 07:57

## 2025-06-13 RX ADMIN — SEVELAMER CARBONATE 1600 MG: 800 TABLET, FILM COATED ORAL at 16:10

## 2025-06-13 RX ADMIN — OXYCODONE 5 MG: 5 TABLET ORAL at 12:37

## 2025-06-13 RX ADMIN — AMITRIPTYLINE HYDROCHLORIDE 25 MG: 25 TABLET ORAL at 21:24

## 2025-06-13 RX ADMIN — AMIODARONE HYDROCHLORIDE 200 MG: 200 TABLET ORAL at 21:24

## 2025-06-13 RX ADMIN — AMIODARONE HYDROCHLORIDE 200 MG: 200 TABLET ORAL at 07:57

## 2025-06-13 RX ADMIN — NIFEDIPINE 60 MG: 30 TABLET, EXTENDED RELEASE ORAL at 07:57

## 2025-06-13 RX ADMIN — OXYCODONE 2.5 MG: 5 TABLET ORAL at 21:24

## 2025-06-13 RX ADMIN — PANTOPRAZOLE SODIUM 40 MG: 40 TABLET, DELAYED RELEASE ORAL at 16:10

## 2025-06-13 RX ADMIN — ATORVASTATIN CALCIUM 40 MG: 40 TABLET, FILM COATED ORAL at 21:23

## 2025-06-13 ASSESSMENT — PAIN DESCRIPTION - ONSET
ONSET: ON-GOING
ONSET: ON-GOING
ONSET: AWAKENED FROM SLEEP
ONSET: ON-GOING

## 2025-06-13 ASSESSMENT — PAIN DESCRIPTION - FREQUENCY
FREQUENCY: CONTINUOUS

## 2025-06-13 ASSESSMENT — PAIN DESCRIPTION - LOCATION
LOCATION: BACK;ABDOMEN
LOCATION: ABDOMEN;BACK
LOCATION: BACK;ABDOMEN
LOCATION: BACK

## 2025-06-13 ASSESSMENT — PAIN SCALES - GENERAL
PAINLEVEL_OUTOF10: 6
PAINLEVEL_OUTOF10: 10
PAINLEVEL_OUTOF10: 7
PAINLEVEL_OUTOF10: 10
PAINLEVEL_OUTOF10: 6
PAINLEVEL_OUTOF10: 2
PAINLEVEL_OUTOF10: 5
PAINLEVEL_OUTOF10: 3

## 2025-06-13 ASSESSMENT — PAIN DESCRIPTION - ORIENTATION
ORIENTATION: LOWER

## 2025-06-13 ASSESSMENT — PAIN DESCRIPTION - PAIN TYPE
TYPE: ACUTE PAIN

## 2025-06-13 ASSESSMENT — PAIN - FUNCTIONAL ASSESSMENT
PAIN_FUNCTIONAL_ASSESSMENT: ACTIVITIES ARE NOT PREVENTED
PAIN_FUNCTIONAL_ASSESSMENT: ACTIVITIES ARE NOT PREVENTED
PAIN_FUNCTIONAL_ASSESSMENT: PREVENTS OR INTERFERES SOME ACTIVE ACTIVITIES AND ADLS
PAIN_FUNCTIONAL_ASSESSMENT: INTOLERABLE, UNABLE TO DO ANY ACTIVE OR PASSIVE ACTIVITIES

## 2025-06-13 ASSESSMENT — PAIN DESCRIPTION - DESCRIPTORS
DESCRIPTORS: ACHING;SORE;DISCOMFORT
DESCRIPTORS: SHARP;ACHING;DISCOMFORT
DESCRIPTORS: ACHING
DESCRIPTORS: SHARP;ACHING;DISCOMFORT

## 2025-06-13 NOTE — PROGRESS NOTES
Nephrology Note                                                                                                                                                                                                                                                                                                                                                               Office : [phone]     Fax :[phone]    Patient's Name: Florida Saleem  6/13/2025    Reason for Consult:  ESRD on HD   Requesting Physician:  Kim Liu MD  Chief Complaint:    Chief Complaint   Patient presents with    Shortness of Breath     Pt presents to ED from dialysis d/t low heart rate and c/o SOB. Pt did not receive HD tx today. Reports that her eyes and nose have been burning d/t the air at facility she lives. Pt c/o dizziness and chest tightness.       Assessment/Plan     # ESRD on HD   - Dialyzes every TTS as an outpatient  - HD today  - Continue Torsemide as patient still urinates   - Renally dose meds  - Monitor renal labs     # HTN  - BP's currently acceptable  - On Metoprolol & Nifedipine  - Monitor     # Anemia of chronic disease   - Hgb at goal for ESRD  - Monitor     # Acid- base/ Electrolyte imbalance   - Plan for HD today, as above  - Low K diet     # MBD management  # H/o parathyroidectomy  - Sevelamer with meals  - Low phos diet   - Calcitriol 1 mcg BID     # Abdominal pain  # Left complex renal cyst   # H/o AAA s/p repair  - CTA - evidence of endo leak. Will ultimately need ablation   - Vascular surgery on board ; no acute intervention at this time  - Urology consulted to further evaluate cyst     # Sinus bradycardia  # PAF  - On BB with holding parameters. CCB on hold  - On Amiodarone    # Possible UTI  # H/o ESBL UTI  - Abx management per primary        History of Present Ilness:    Florida Saleem is a 69 y.o. female with a PMH of ESRD on HD, HTN, HLD, PCKD, DM2, AAA s/p repair, who presented to the ER with multiple

## 2025-06-13 NOTE — PLAN OF CARE
Problem: Pain  Goal: Verbalizes/displays adequate comfort level or baseline comfort level  Outcome: Progressing   Pain managed per Mar, please see scheduled and PRN medications.     Problem: Chronic Conditions and Co-morbidities  Goal: Patient's chronic conditions and co-morbidity symptoms are monitored and maintained or improved  Outcome: Progressing   Stable.   Problem: Skin/Tissue Integrity  Goal: Skin integrity remains intact  Description: 1.  Monitor for areas of redness and/or skin breakdown2.  Assess vascular access sites hourly3.  Every 4-6 hours minimum:  Change oxygen saturation probe site4.  Every 4-6 hours:  If on nasal continuous positive airway pressure, respiratory therapy assess nares and determine need for appliance change or resting period  Outcome: Progressing   Pt refuses to be turned/straightened in the bed. Pt leans R side.

## 2025-06-13 NOTE — PROGRESS NOTES
Treatment time: 3 hours     Net UF: 1 liter doctor Da was aware of it.    Pre weight: 112 kg  Post weight: 111 kg    Access used: Right CVC  Access function: Access site located on the Right Chest wall had clean dry an intact CVC dressing. No signs of infection noted. No redness, hematoma nor swelling was observed. No discharges was seen Both ports had good flow.    Medications or blood products given: Heparin dwell    Regular outpatient schedule: M,W,F    Summary of response to treatment: 08:50: Treatment set at 1 liter doctor Roberson was aware of it for 3 hours via Right CVC. Access site located on the Right Chest wall had clean dry an intact CVC dressing. No signs of infection noted. No redness, hematoma nor swelling was observed. No discharges was seen Both ports had good flow. Parameters set accordingly. Hooked to HD machine. Monitored from time to time. 11:50: Treatment completed. Returned blood aseptically. HD tolerated well.    Copy of dialysis treatment record placed in chart, to be scanned into EMR.

## 2025-06-13 NOTE — PROGRESS NOTES
Vascular Surgery Daily Progress Note  Patient: Florida Saleem    CC: Type II endoleak    Subjective :  See nursing note for overnight events.  Abdominal pain unchanged.    ROS: A 14 point review of systems was conducted, significant findings as noted above. All other systems negative.    Objective :   Infusions:   sodium chloride          I/O:I/O last 3 completed shifts:  In: 2200 [P.O.:2190; I.V.:10]  Out: 1650 [Urine:1650]           Wt Readings from Last 1 Encounters:   06/11/25 112 kg (246 lb 14.6 oz)       Exam:/78   Pulse 64   Temp 97.8 °F (36.6 °C) (Oral)   Resp 17   Ht 1.702 m (5' 7\")   Wt 112 kg (246 lb 14.6 oz)   SpO2 96%   BMI 38.67 kg/m²     General appearance: alert, in no apparent distress   Lungs: clear to auscultation bilaterally; no crackles, no rhonchi, no wheezes, no rubs  Heart: regular rate and rhythm; S1, S2 normal; no murmurs, no rubs, no gallops  Abdomen: soft, tender over left upper quadrant , non-distended; no guarding, no rigidity  Extremities: no open wounds or acute signs of infection    Pulses    PT DP   R +/+ +/+   L +/+ +/+    +, -/+ ,-/-       LABS:   Recent Labs     06/11/25  0805 06/12/25  0732   WBC 11.2* 9.8   HGB 12.6 12.1   HCT 38.5 36.1   MCV 91.8 93.5   PLT see below 156        Recent Labs     06/12/25  0732 06/12/25  1458   * 130*   K see below 5.0   CL 93* 94*   CO2 15* 19*   PHOS 5.3* 5.4*   BUN 55* 60*   CREATININE 9.9* 10.5*        Recent Labs     06/10/25  0900   AST 21   ALT 25   BILITOT 0.3   ALKPHOS 87      No results for input(s): \"LIPASE\", \"AMYLASE\" in the last 72 hours.     Recent Labs     06/12/25  1458   INR 1.02      No results for input(s): \"CKTOTAL\", \"CKMB\", \"CKMBINDEX\", \"TROPONINI\" in the last 72 hours.    ASSESSMENT/PLAN: Florida Saleem is a 69 y.o. female  with Hx of HTN, HLD, DM, COPD, ESRD on hemodialysis (Tu/Th/Sat), parathyroidectomy (8/2024), appendectomy, cholecystectomy, chronic abdominal pain, and AAA s/p endovascular repair  (9/2023).  Vascular consulted regarding newly-noted endoleak in setting of aneurysm sac growth (6.1cm from 5.8cm Nov '24).    - Ablation of type II endo leak with IR, current plan to ablate in the outpatient setting once medical conditions are stabilized. Recommend IR re-engagement for possible IR angioembolization during this admission given concern for patient inability to follow-up outpatient  - Left renal cyst palpable on physical exam in area of maximal tenderness, consult to urology placed for eval, likely main cause of abdominal pain  - IR planning CT guided aspiration of renal cyst today (6/13)  - External catheter in place, clear yellow urine output  - Remainder of care per primary    Esequiel Mg DPM   Podiatric Resident PGY-2  Pager [phone] or PerfectServe  6/13/2025, 6:57 AM

## 2025-06-13 NOTE — CARE COORDINATION
DISCHARGE PLANNIN  Chart reviewed.  Patient is from [facility] as a long term care resident. She receives HD on TTS.  She is wheelchair bound.    Current discharge plan is to return to [facility] as a LTC resident. No pre-cert needed to return.  I called admission liaison to see where she goes for dialysis; she will find out and call me back.  May need IV ABX at discharge (no ID consult).    Urology following for possible CT guided cyst aspiration.  Vascular following for non-emergent endoleak with plans for IR ablation.    UPDATE: 1433  Patient goes to [facility].    CM team will continue to follow.  Myra Kidd, RN Case Manager  [phone]

## 2025-06-13 NOTE — PROGRESS NOTES
V2.0    [facility] Progress Note      Name:  Florida Saleem /Age/Sex: 1955  (69 y.o. female)   MRN & CSN:  [id number] & [id number] Encounter Date/Time: 2025 10:37 AM EDT   Location:  [facility]- PCP: Kim Liu MD     Attending:Faheem Richardson,Ruthann       Hospital Day: 4    Assessment and Recommendations     Patient Active Problem List   Diagnosis    Gout    ACS (acute coronary syndrome) (HCC)    Acute renal failure    ARF (acute renal failure)    Essential hypertension    Anemia of chronic renal failure, stage 5 (HCC)    Electrolyte imbalance    Secondary hypertension    AMS (altered mental status)    Acute pancreatitis    Adjustment disorder with mixed disturbance of emotions and conduct    Anxiety    Arthritis of knee, right    Delusional ideas (HCC)    Paranoid (HCC)    Disorder of bladder    Encephalopathy    Generalized weakness    Diabetic ulcer of left heel, limited to breakdown of skin (HCC)    Heel ulceration ([facility])    Postmenopausal bleeding    Psychotic disorder ([facility])    Renal cyst    Uterovaginal prolapse, incomplete    Acute-on-chronic kidney injury    Transient alteration of awareness    AAA (abdominal aortic aneurysm) without rupture    ESRD (end stage renal disease) (HCC)    Enlarging abdominal aortic aneurysm (AAA)    Other emphysema (HCC)    Dyslipidemia    Diabetes mellitus type 2, insulin dependent (HCC)    Hyponatremia with excess extracellular fluid volume    Hyperkalemia    Acute colitis    Rectal bleed    Chronic kidney disease-mineral and bone disorder (CKD-MBD)    Anemia of chronic renal failure    Ischemic colitis    Encounter for palliative care    Chronic diastolic congestive heart failure (HCC)    UTI due to extended-spectrum beta lactamase (ESBL) producing Escherichia coli       69-year-old lady who is a nursing home resident/wheelchair-bound known case of end-stage renal disease on hemodialysis, hypertension, hyperlipidemia, gout, polycystic kidney disease, and  06/12/25  0732 06/12/25  1458 06/13/25  0721   * 130* 129*   K see below 5.0 6.0*   CL 93* 94* 93*   CO2 15* 19* 20*   BUN 55* 60* 67*   CREATININE 9.9* 10.5* 11.5*   GLUCOSE 92 111* 83     Hepatic:   No results for input(s): \"AST\", \"ALT\", \"BILITOT\", \"ALKPHOS\" in the last 72 hours.    Invalid input(s): \"ALB\"    Lipids:   Lab Results   Component Value Date/Time    CHOL 170 06/10/2019 12:12 PM    HDL 40 06/10/2019 12:12 PM    TRIG 132 06/10/2019 12:12 PM     Hemoglobin A1C:   Lab Results   Component Value Date/Time    LABA1C 4.8 11/04/2024 02:36 AM     TSH:   Lab Results   Component Value Date/Time    TSH 0.78 07/25/2020 10:59 AM     Troponin: No results found for: \"TROPONINT\"  Lactic Acid: No results for input(s): \"LACTA\" in the last 72 hours.  BNP:   No results for input(s): \"PROBNP\" in the last 72 hours.    UA:  Lab Results   Component Value Date/Time    NITRU Negative 06/10/2025 02:08 PM    COLORU Yellow 06/10/2025 02:08 PM    PHUR 6.0 06/10/2025 02:08 PM    PHUR 8.0 09/08/2023 08:15 AM    WBCUA  06/10/2025 02:08 PM    RBCUA 3-4 06/10/2025 02:08 PM    MUCUS 2+ 05/29/2021 06:00 AM    BACTERIA 4+ 06/10/2025 02:08 PM    CLARITYU SL CLOUDY 06/10/2025 02:08 PM    LEUKOCYTESUR LARGE 06/10/2025 02:08 PM    UROBILINOGEN 0.2 06/10/2025 02:08 PM    BILIRUBINUR Negative 06/10/2025 02:08 PM    BLOODU SMALL 06/10/2025 02:08 PM    GLUCOSEU Negative 06/10/2025 02:08 PM    KETUA Negative 06/10/2025 02:08 PM    AMORPHOUS 1+ 07/24/2020 11:24 PM     Urine Cultures:   Lab Results   Component Value Date/Time    LABURIN  06/10/2025 02:07 PM     <50,000 CFU/ml mixed skin/urogenital betsy. No further workup    LABURIN  06/10/2025 02:07 PM     >100,000 CFU/ml  CONTACT PRECAUTIONS INDICATED  Carbapenem antibiotics are the best option for infections caused  by ESBL producing organisms.  Other antibiotic classes are  likely to result in treatment failure, even for organisms  demonstrating in vitro susceptibility.       Blood

## 2025-06-13 NOTE — PROGRESS NOTES
Urology Attending Progress Note      Subjective: No acute complaints. Seen in dialysis. States she is wanting to speak to nephrology prior to any procedures done today.     Vitals:  /68   Pulse 59   Temp 98.2 °F (36.8 °C) (Oral)   Resp 16   Ht 1.702 m (5' 7\")   Wt 112 kg (246 lb 14.6 oz)   SpO2 97%   BMI 38.67 kg/m²   Temp  Av.1 °F (36.7 °C)  Min: 97.8 °F (36.6 °C)  Max: 98.4 °F (36.9 °C)    Intake/Output Summary (Last 24 hours) at 2025 0909  Last data filed at 2025 0448  Gross per 24 hour   Intake 680 ml   Output 700 ml   Net -20 ml       Exam: Laying in bed, appears comfortable    Labs:  WBC:    Lab Results   Component Value Date/Time    WBC 9.2 2025 07:21 AM     Hemoglobin/Hematocrit:    Lab Results   Component Value Date/Time    HGB 12.0 2025 07:21 AM    HCT 35.7 2025 07:21 AM     BMP:    Lab Results   Component Value Date/Time     2025 07:21 AM    K 6.0 2025 07:21 AM    K 4.7 06/10/2025 09:00 AM    CL 93 2025 07:21 AM    CO2 20 2025 07:21 AM    BUN 67 2025 07:21 AM    CREATININE 11.5 2025 07:21 AM    CALCIUM 8.3 2025 07:21 AM    GFRAA 8 2022 02:57 PM    LABGLOM 3 2025 07:21 AM    LABGLOM 6 2023 11:26 AM     PT/INR:    Lab Results   Component Value Date/Time    PROTIME 13.7 2025 07:21 AM    INR 1.02 2025 07:21 AM     PTT:    Lab Results   Component Value Date/Time    APTT 32.1 2024 02:36 AM   [APT    Impression/Plan: 70yo F with 12.8cm L renal cyst and ESBL Ecoli UTI. We were consulted for recs regarding her renal cyst.    -Orders placed yesterday for CT guided cyst aspiration. Spoke with patient's family as well  -Patient today stating she is hesitant to have procedure done and wants to talk to nephrology beforehand. Will contact them  -If procedure not done today, can eat from our standpoint  -Call with any questions     MARK Nichole

## 2025-06-13 NOTE — PROGRESS NOTES
Pt A&O x4, VSS. Refuses to be turned despite education & lack of turning self. Pt leans on right side, and has pillow support. Wheel chair at baseline. Pt has not walked in years, and per daughter, neither of them are comfortable or confident in the use of a stedy to get pt oob.     Pt apprehensive & uncooperative to care. Started the shift off w/ the Pt asking this RN her age, seemingly not liking that this RN is younger and newer, despite practicing for about a year now. This RN attempted to reassure the pt that she was in good hands, and that this RN will provide excellent care. Pt. was not the most convinced. Pt was also questioning her plan, stating that she thinks she should be at , and is scared of having any sort of procedure. This RN as well as off going RN educated the Pt multiple times on her plan, and the importance of following through. These RNs told pt that teams here are very capable of draining her cyst, and taking care of her, but that if a transfer to  was truly desired, that it could happen, it would probably just take a couple of days. Pt was not happy w/ any answer provided. Daughter at bedside also trying to educate mother.     Pain being managed per MAR, w/ scheduled and PRN meds. Please see MAR & medication orders. Before PO oxy given, pt agreed to tylenol to be given between dilaudid and oxy. Pt was not happy that it took the nurse \"so long\" to come back with tylenol after IV pain med administration, after spending 35 minutes in the pt room to educate and console her regarding plan & care team. This RN apologized to Pt, and spent more time addressing concerns. Pt then declined it and said they wanted the oxy since they had to wait so long. PRN oxy was then given. PRN tylenol still available for pain at this time.     This RN educated Pt on NPO orders at 0000, in which Pt replied \"I haven't decided if I am getting the procedure tomorrow.\" This RN educated pt about the necessity of  No

## 2025-06-13 NOTE — PLAN OF CARE
Problem: Pain  Goal: Verbalizes/displays adequate comfort level or baseline comfort level  6/13/2025 1002 by India Lieberman, RN  Outcome: Progressing  Note: Pt encouraged to monitor pain and request assistance. Appropriate pain scale is being used.     Problem: Skin/Tissue Integrity  Goal: Skin integrity remains intact  Description: 1.  Monitor for areas of redness and/or skin breakdown2.  Assess vascular access sites hourly3.  Every 4-6 hours minimum:  Change oxygen saturation probe site4.  Every 4-6 hours:  If on nasal continuous positive airway pressure, respiratory therapy assess nares and determine need for appliance change or resting period  6/13/2025 1002 by India Lieberman, RN  Outcome: Progressing  Note: Skin assessed frequently. Pt often refuses turns/ repositioning. Educated on importance, continues to refuse.      Problem: Safety - Adult  Goal: Free from fall injury  6/13/2025 1002 by India Lieberman, RN  Outcome: Progressing  Note: Pt free of fall injury this shift. All proper safety precautions are in place. Call light is within reach. Bed alarm is on.

## 2025-06-13 NOTE — PROGRESS NOTES
IR aspiration of renal cyst ordered. Patient upset that procedure not explained to her before today, so patient seen at bedside. Discussed procedure including risks and benefits, including possibility that aspiration of cyst might not relieve abdominal pain given that cyst has been present for several years and patient currently with UTI. Patient does not feel comfortable proceeding with aspiration at this time. Would prefer to have care at . Order for CT aspiration discontinued.

## 2025-06-13 NOTE — PROGRESS NOTES
Patient is A&Ox 4. VSS this shift with exception of elevated BP. Patient has endorsed pain to low back/ abdomen managed well per MAR and non-pharm measures. Patient is tolerating PO diet. Tolerating ambulation x 2 assist with stedy. Voiding via external catheter. Patient updated on plan of care. Fall and safety precautions in place, call light within reach.     Pt refused IR procedure today, received her dialysis

## 2025-06-14 ENCOUNTER — ANCILLARY PROCEDURE (OUTPATIENT)
Dept: EMERGENCY DEPT | Age: 70
End: 2025-06-14
Attending: EMERGENCY MEDICINE
Payer: MEDICAID

## 2025-06-14 LAB
ALBUMIN SERPL-MCNC: 3.3 G/DL (ref 3.4–5)
ANION GAP SERPL CALCULATED.3IONS-SCNC: 14 MMOL/L (ref 3–16)
ANION GAP SERPL CALCULATED.3IONS-SCNC: 15 MMOL/L (ref 3–16)
BASOPHILS # BLD: 0.1 K/UL (ref 0–0.2)
BASOPHILS NFR BLD: 0.6 %
BUN SERPL-MCNC: 43 MG/DL (ref 7–20)
BUN SERPL-MCNC: 47 MG/DL (ref 7–20)
CALCIUM SERPL-MCNC: 8.2 MG/DL (ref 8.3–10.6)
CALCIUM SERPL-MCNC: 8.5 MG/DL (ref 8.3–10.6)
CHLORIDE SERPL-SCNC: 94 MMOL/L (ref 99–110)
CHLORIDE SERPL-SCNC: 95 MMOL/L (ref 99–110)
CO2 SERPL-SCNC: 20 MMOL/L (ref 21–32)
CO2 SERPL-SCNC: 20 MMOL/L (ref 21–32)
CREAT SERPL-MCNC: 8.7 MG/DL (ref 0.6–1.2)
CREAT SERPL-MCNC: 9.8 MG/DL (ref 0.6–1.2)
DEPRECATED RDW RBC AUTO: 21.2 % (ref 12.4–15.4)
EOSINOPHIL # BLD: 0.9 K/UL (ref 0–0.6)
EOSINOPHIL NFR BLD: 10.5 %
GFR SERPLBLD CREATININE-BSD FMLA CKD-EPI: 4 ML/MIN/{1.73_M2}
GFR SERPLBLD CREATININE-BSD FMLA CKD-EPI: 5 ML/MIN/{1.73_M2}
GLUCOSE BLD-MCNC: 103 MG/DL (ref 70–99)
GLUCOSE BLD-MCNC: 109 MG/DL (ref 70–99)
GLUCOSE BLD-MCNC: 112 MG/DL (ref 70–99)
GLUCOSE BLD-MCNC: 124 MG/DL (ref 70–99)
GLUCOSE BLD-MCNC: 146 MG/DL (ref 70–99)
GLUCOSE SERPL-MCNC: 104 MG/DL (ref 70–99)
GLUCOSE SERPL-MCNC: 70 MG/DL (ref 70–99)
HCT VFR BLD AUTO: 34.8 % (ref 36–48)
HGB BLD-MCNC: 11.7 G/DL (ref 12–16)
LYMPHOCYTES # BLD: 1.5 K/UL (ref 1–5.1)
LYMPHOCYTES NFR BLD: 18.7 %
MAGNESIUM SERPL-MCNC: 2.5 MG/DL (ref 1.8–2.4)
MCH RBC QN AUTO: 31 PG (ref 26–34)
MCHC RBC AUTO-ENTMCNC: 33.5 G/DL (ref 31–36)
MCV RBC AUTO: 92.6 FL (ref 80–100)
MONOCYTES # BLD: 0.8 K/UL (ref 0–1.3)
MONOCYTES NFR BLD: 9.5 %
NEUTROPHILS # BLD: 5 K/UL (ref 1.7–7.7)
NEUTROPHILS NFR BLD: 60.7 %
PERFORMED ON: ABNORMAL
PHOSPHATE SERPL-MCNC: 5.1 MG/DL (ref 2.5–4.9)
PLATELET # BLD AUTO: 122 K/UL (ref 135–450)
PMV BLD AUTO: 7.2 FL (ref 5–10.5)
POTASSIUM SERPL-SCNC: 5.2 MMOL/L (ref 3.5–5.1)
POTASSIUM SERPL-SCNC: 5.2 MMOL/L (ref 3.5–5.1)
RBC # BLD AUTO: 3.76 M/UL (ref 4–5.2)
SODIUM SERPL-SCNC: 129 MMOL/L (ref 136–145)
SODIUM SERPL-SCNC: 129 MMOL/L (ref 136–145)
WBC # BLD AUTO: 8.2 K/UL (ref 4–11)

## 2025-06-14 PROCEDURE — 36415 COLL VENOUS BLD VENIPUNCTURE: CPT

## 2025-06-14 PROCEDURE — 6370000000 HC RX 637 (ALT 250 FOR IP): Performed by: STUDENT IN AN ORGANIZED HEALTH CARE EDUCATION/TRAINING PROGRAM

## 2025-06-14 PROCEDURE — 93308 TTE F-UP OR LMTD: CPT

## 2025-06-14 PROCEDURE — 80069 RENAL FUNCTION PANEL: CPT

## 2025-06-14 PROCEDURE — 2580000003 HC RX 258: Performed by: STUDENT IN AN ORGANIZED HEALTH CARE EDUCATION/TRAINING PROGRAM

## 2025-06-14 PROCEDURE — 2580000003 HC RX 258: Performed by: HOSPITALIST

## 2025-06-14 PROCEDURE — 6370000000 HC RX 637 (ALT 250 FOR IP): Performed by: HOSPITALIST

## 2025-06-14 PROCEDURE — 85025 COMPLETE CBC W/AUTO DIFF WBC: CPT

## 2025-06-14 PROCEDURE — 6360000002 HC RX W HCPCS: Performed by: HOSPITALIST

## 2025-06-14 PROCEDURE — 99233 SBSQ HOSP IP/OBS HIGH 50: CPT | Performed by: INTERNAL MEDICINE

## 2025-06-14 PROCEDURE — 6360000002 HC RX W HCPCS: Performed by: STUDENT IN AN ORGANIZED HEALTH CARE EDUCATION/TRAINING PROGRAM

## 2025-06-14 PROCEDURE — 1200000000 HC SEMI PRIVATE

## 2025-06-14 PROCEDURE — 83735 ASSAY OF MAGNESIUM: CPT

## 2025-06-14 PROCEDURE — 6370000000 HC RX 637 (ALT 250 FOR IP): Performed by: NURSE PRACTITIONER

## 2025-06-14 RX ORDER — FUROSEMIDE 10 MG/ML
40 INJECTION INTRAMUSCULAR; INTRAVENOUS ONCE
Status: DISCONTINUED | OUTPATIENT
Start: 2025-06-14 | End: 2025-06-20 | Stop reason: HOSPADM

## 2025-06-14 RX ORDER — DEXTROSE MONOHYDRATE 100 MG/ML
INJECTION, SOLUTION INTRAVENOUS CONTINUOUS PRN
Status: DISCONTINUED | OUTPATIENT
Start: 2025-06-14 | End: 2025-06-20 | Stop reason: HOSPADM

## 2025-06-14 RX ORDER — CALCIUM GLUCONATE 20 MG/ML
1000 INJECTION, SOLUTION INTRAVENOUS ONCE
Status: COMPLETED | OUTPATIENT
Start: 2025-06-14 | End: 2025-06-14

## 2025-06-14 RX ORDER — GLUCAGON 1 MG/ML
1 KIT INJECTION PRN
Status: DISCONTINUED | OUTPATIENT
Start: 2025-06-14 | End: 2025-06-20 | Stop reason: HOSPADM

## 2025-06-14 RX ADMIN — DEXTROSE 250 ML: 10 SOLUTION INTRAVENOUS at 09:06

## 2025-06-14 RX ADMIN — TORSEMIDE 40 MG: 20 TABLET ORAL at 08:42

## 2025-06-14 RX ADMIN — OXYCODONE 5 MG: 5 TABLET ORAL at 03:50

## 2025-06-14 RX ADMIN — INSULIN HUMAN 10 UNITS: 100 INJECTION, SOLUTION PARENTERAL at 12:21

## 2025-06-14 RX ADMIN — NIFEDIPINE 60 MG: 30 TABLET, EXTENDED RELEASE ORAL at 08:41

## 2025-06-14 RX ADMIN — AMIODARONE HYDROCHLORIDE 200 MG: 200 TABLET ORAL at 08:42

## 2025-06-14 RX ADMIN — CETIRIZINE HYDROCHLORIDE 5 MG: 10 TABLET, FILM COATED ORAL at 08:41

## 2025-06-14 RX ADMIN — SEVELAMER CARBONATE 1600 MG: 800 TABLET, FILM COATED ORAL at 17:22

## 2025-06-14 RX ADMIN — METOPROLOL SUCCINATE 50 MG: 50 TABLET, EXTENDED RELEASE ORAL at 20:21

## 2025-06-14 RX ADMIN — ACETAMINOPHEN 650 MG: 325 TABLET ORAL at 20:23

## 2025-06-14 RX ADMIN — OXYCODONE 5 MG: 5 TABLET ORAL at 20:21

## 2025-06-14 RX ADMIN — ACETAMINOPHEN 650 MG: 325 TABLET ORAL at 00:43

## 2025-06-14 RX ADMIN — ATORVASTATIN CALCIUM 40 MG: 40 TABLET, FILM COATED ORAL at 20:21

## 2025-06-14 RX ADMIN — AMITRIPTYLINE HYDROCHLORIDE 25 MG: 25 TABLET, FILM COATED ORAL at 21:58

## 2025-06-14 RX ADMIN — SEVELAMER CARBONATE 1600 MG: 800 TABLET, FILM COATED ORAL at 08:42

## 2025-06-14 RX ADMIN — OXYCODONE 5 MG: 5 TABLET ORAL at 08:42

## 2025-06-14 RX ADMIN — SEVELAMER CARBONATE 1600 MG: 800 TABLET, FILM COATED ORAL at 13:44

## 2025-06-14 RX ADMIN — PANTOPRAZOLE SODIUM 40 MG: 40 TABLET, DELAYED RELEASE ORAL at 17:22

## 2025-06-14 RX ADMIN — AMIODARONE HYDROCHLORIDE 200 MG: 200 TABLET ORAL at 20:23

## 2025-06-14 RX ADMIN — OXYCODONE 5 MG: 5 TABLET ORAL at 14:17

## 2025-06-14 RX ADMIN — PANTOPRAZOLE SODIUM 40 MG: 40 TABLET, DELAYED RELEASE ORAL at 06:53

## 2025-06-14 RX ADMIN — ASPIRIN 81 MG: 81 TABLET, COATED ORAL at 08:42

## 2025-06-14 RX ADMIN — CALCIUM GLUCONATE 1000 MG: 20 INJECTION, SOLUTION INTRAVENOUS at 13:45

## 2025-06-14 RX ADMIN — MEROPENEM 500 MG: 500 INJECTION INTRAVENOUS at 17:20

## 2025-06-14 ASSESSMENT — PAIN DESCRIPTION - LOCATION
LOCATION: BACK
LOCATION: ABDOMEN;BACK
LOCATION: BACK
LOCATION: BACK

## 2025-06-14 ASSESSMENT — PAIN - FUNCTIONAL ASSESSMENT
PAIN_FUNCTIONAL_ASSESSMENT: PREVENTS OR INTERFERES SOME ACTIVE ACTIVITIES AND ADLS
PAIN_FUNCTIONAL_ASSESSMENT: PREVENTS OR INTERFERES WITH MANY ACTIVE NOT PASSIVE ACTIVITIES
PAIN_FUNCTIONAL_ASSESSMENT: PREVENTS OR INTERFERES SOME ACTIVE ACTIVITIES AND ADLS
PAIN_FUNCTIONAL_ASSESSMENT: ACTIVITIES ARE NOT PREVENTED
PAIN_FUNCTIONAL_ASSESSMENT: PREVENTS OR INTERFERES SOME ACTIVE ACTIVITIES AND ADLS

## 2025-06-14 ASSESSMENT — PAIN SCALES - GENERAL
PAINLEVEL_OUTOF10: 8
PAINLEVEL_OUTOF10: 7
PAINLEVEL_OUTOF10: 7
PAINLEVEL_OUTOF10: 10
PAINLEVEL_OUTOF10: 7
PAINLEVEL_OUTOF10: 9
PAINLEVEL_OUTOF10: 5
PAINLEVEL_OUTOF10: 6
PAINLEVEL_OUTOF10: 9
PAINLEVEL_OUTOF10: 8

## 2025-06-14 ASSESSMENT — PAIN DESCRIPTION - DESCRIPTORS
DESCRIPTORS: ACHING;SHOOTING
DESCRIPTORS: ACHING
DESCRIPTORS: ACHING;DISCOMFORT
DESCRIPTORS: ACHING
DESCRIPTORS: ACHING

## 2025-06-14 ASSESSMENT — PAIN DESCRIPTION - ONSET
ONSET: ON-GOING

## 2025-06-14 ASSESSMENT — PAIN DESCRIPTION - FREQUENCY
FREQUENCY: CONTINUOUS

## 2025-06-14 ASSESSMENT — PAIN DESCRIPTION - PAIN TYPE
TYPE: ACUTE PAIN;CHRONIC PAIN
TYPE: ACUTE PAIN

## 2025-06-14 ASSESSMENT — PAIN DESCRIPTION - ORIENTATION
ORIENTATION: LOWER
ORIENTATION: LEFT;POSTERIOR
ORIENTATION: LOWER
ORIENTATION: LOWER
ORIENTATION: LEFT;POSTERIOR

## 2025-06-14 ASSESSMENT — PAIN DESCRIPTION - DIRECTION: RADIATING_TOWARDS: NO

## 2025-06-14 NOTE — PROGRESS NOTES
Patient is alert and oriented x4. VSS on room air. Medications given per MAR, no side effects noted. Patient is non-ambulatory. Patient refused q2 turns, stated \"she is comfortable how she is and does not want to cause pain moving back and forth\".  Endorses pain to back and abdomen. Tolerating PO diet. Voiding via external urinary catheter. No bowel movements this shift. Pt updated on care plan.      Patient is currently resting in bed with bed alarm on for safety. Call light within reach and all fall precautions in place. Plan of care continues.

## 2025-06-14 NOTE — PLAN OF CARE
Problem: Discharge Planning  Goal: Discharge to home or other facility with appropriate resources  Outcome: Progressing  Flowsheets (Taken 6/14/2025 0348 by Ruthie Angelo RN)  Discharge to home or other facility with appropriate resources: Identify barriers to discharge with patient and caregiver  Note: Patient returning to [place] at discharge.     Problem: Pain  Goal: Verbalizes/displays adequate comfort level or baseline comfort level  Outcome: Progressing  Flowsheets (Taken 6/14/2025 1513)  Verbalizes/displays adequate comfort level or baseline comfort level:   Encourage patient to monitor pain and request assistance   Administer analgesics based on type and severity of pain and evaluate response   Assess pain using appropriate pain scale   Implement non-pharmacological measures as appropriate and evaluate response  Note: Managing pain with medications per MAR. Call light left within reach. Plan of care continues.     Problem: Skin/Tissue Integrity  Goal: Skin integrity remains intact  Description: 1.  Monitor for areas of redness and/or skin breakdown2.  Assess vascular access sites hourly3.  Every 4-6 hours minimum:  Change oxygen saturation probe site4.  Every 4-6 hours:  If on nasal continuous positive airway pressure, respiratory therapy assess nares and determine need for appliance change or resting period  Outcome: Progressing  Flowsheets  Taken 6/14/2025 1154 by Samia Snider RN  Skin Integrity Remains Intact: Monitor for areas of redness and/or skin breakdown  Taken 6/14/2025 0746 by Samia Snider RN  Skin Integrity Remains Intact: Monitor for areas of redness and/or skin breakdown  Taken 6/14/2025 0348 by Ruthie Angelo RN  Skin Integrity Remains Intact: Monitor for areas of redness and/or skin breakdown  Note: Encouraging turns q2h. Refusing turns due to pain in lower back.      Problem: Safety - Adult  Goal: Free from fall injury  Outcome: Progressing  Flowsheets  Taken 6/14/2025

## 2025-06-14 NOTE — PROGRESS NOTES
Point of Care Note  Vascular Surgery        Time: 0900  Date: 6/14/2025    Returned call to family member Dedra via phone call. She expressed concerns regarding her mother being more comfortable with procedures being completed at  given procedure history. She had multiple questions regarding cyst aspiration at  and possible transfer to . I updated her that the primary medical team, along with Urology, would be best to coordinate care for procedure or possible transfer to .     She did have questions regarding timing of angio embolization of the endo leak. I reiterated that the patient needs ablation of type II endo leak with IR angioembolization but that this could completed once her other medical conditions including management of the renal cyst were managed. Our team recommends that she receive ablation with IR during this admission so that she is not lost to follow up. She can have it completed here or at  depending on her preference.     Advised Dedra to return a phone call to the medical team for further concerns regarding transfer for procedural services at . Please reach out for any further questions regarding type II endo leak management.     Faheem Calle DO  PGY-1, General Surgery Resident  06/14/25 9:25 AM  [phone]

## 2025-06-14 NOTE — PROGRESS NOTES
Nephrology Progress Note                                                                                                                                                                                                                                                                                                                                                               Office : [phone]     Fax :[phone]    Patient's Name: [place] Saleem  6/14/2025    Reason for Consult:  ESRD on HD   Requesting Physician:  Kim Liu MD  Chief Complaint:    Chief Complaint   Patient presents with    Shortness of Breath     Pt presents to ED from dialysis d/t low heart rate and c/o SOB. Pt did not receive HD tx today. Reports that her eyes and nose have been burning d/t the air at facility she lives. Pt c/o dizziness and chest tightness.       Assessment/Plan     # ESRD on HD   - Dialyzes every TTS as an outpatient  - HD yesterday. Anticipate next HD on Monday   - Continue Torsemide as patient still urinates   - Renally dose meds  - Monitor renal labs     # HTN  - BP's currently acceptable  - On Metoprolol & Nifedipine  - Monitor     # Anemia of chronic disease   - Hgb at goal for ESRD  - Monitor     # Acid- base/ Electrolyte imbalance   - HD schedule, as above   - Low K diet     # MBD management  # H/o parathyroidectomy  - Sevelamer with meals  - Low phos diet   - Calcitriol 1 mcg BID     # Abdominal pain  # Left complex renal cyst   # H/o AAA s/p repair  - CTA - evidence of endo leak. Will ultimately need ablation   - Vascular surgery on board ; no acute intervention at this time  - Urology consulted to further evaluate cyst - pt doesn't want aspiration here     # Sinus bradycardia  # PAF  - On BB with holding parameters. CCB on hold  - On Amiodarone    # Possible UTI  # H/o ESBL UTI  - Abx management per primary        History of Present Ilness:    [place] Saleem is a 69 y.o. female with a PMH of ESRD on HD,

## 2025-06-14 NOTE — PROGRESS NOTES
Placed call to patient's daughter Dedra. Upon conversation with patient and her daughter, pt stated she did not feel fully educated on cyst aspiration that was scheduled for today. Due to increased anxiety regarding the procedure, pt cancelled aspiration scheduled today. Pt and daughter would like to have procedure completed at , as pt has had the procedure completed at  previously.     Pt and daughter also requesting follow up phone calls from Nephrology and Vascular. Daughter (Dedra) can be reached at [phone]. Advised Dedra requests for phone calls will be placed with corresponding providers, as well as the desire to transfer to  for cyst aspiration when pt is stable for discharge in the morning.     Dedra expressed no additional concerns at this time, and stated she looks forward to speaking with interdisciplinary team in the morning.

## 2025-06-14 NOTE — PLAN OF CARE
Urology will sign off at this time, as patient does not want intervention for renal cyst during this admission.    Please call if questions or concerns    Cayla Kline PA-C

## 2025-06-14 NOTE — PLAN OF CARE
Problem: Pain  Goal: Verbalizes/displays adequate comfort level or baseline comfort level  6/13/2025 2110 by Ruthie Angelo RN  Outcome: Not Progressing  Flowsheets (Taken 6/13/2025 2000)  Verbalizes/displays adequate comfort level or baseline comfort level: Assess pain using appropriate pain scale  Pt endorses pain to back and abdomen.     Problem: Skin/Tissue Integrity  Goal: Skin integrity remains intact  Description: 1.  Monitor for areas of redness and/or skin breakdown2.  Assess vascular access sites hourly3.  Every 4-6 hours minimum:  Change oxygen saturation probe site4.  Every 4-6 hours:  If on nasal continuous positive airway pressure, respiratory therapy assess nares and determine need for appliance change or resting period  6/13/2025 2110 by Ruthie Angelo RN  Outcome: Progressing  Pt refuses to be turned, despite extensive education. Pt stated \"none of you know what you are doing and to leave her alone\".       Problem: Safety - Adult  Goal: Free from fall injury  6/13/2025 2110 by Ruthie Angelo RN  Outcome: Progressing  All fall precautions in place. Bed locked and in lowest position with alarm on. Overbed table and personal belonings within reach. Call light within reach and patient instructed to use call light for assistance. Non-skid socks on.      Problem: Discharge Planning  Goal: Discharge to home or other facility with appropriate resources  6/13/2025 2110 by Ruthie Angelo RN  Outcome: Progressing  Pt involved in discharge planning. Barriers to discharge discussed with patient. Discharge learning needs identified. Discuss with patient any additional needed resources and transportation plans. Case management following plan of care.    Problem: Chronic Conditions and Co-morbidities  Goal: Patient's chronic conditions and co-morbidity symptoms are monitored and maintained or improved  6/13/2025 2110 by Ruthie Angelo RN  Outcome: Progressing

## 2025-06-14 NOTE — PROGRESS NOTES
Vitals:    06/14/25 1548   BP: 115/61   Pulse: 63   Resp: 16   Temp: 98.2 °F (36.8 °C)   SpO2: 97%     A&Ox3. VSS on RA. Managing pain with medications per MAR. Has not ambulated this shift. X2 stedy and gait belt for transfers. Vascath in place, CHG line care completed. Tolerating po diet, denies n/v. Medications administered per MAR. C/o dizziness, confusion, and weakness, MD notified via secure messaging. Blood glucose stable. Repeat K+ lab collected. Patient states dizziness and weakness improved on own. Voiding via purewick. No BM this shift.  Encouraging q2h turns. No acute neuro changes this shift. No c/o SOB or chest pain. Daughter, Dedra, updated on plan of care. Patient resting in bed currently. All needs met. Fall precautions in place. Call light left within reach. Plan of care continues.

## 2025-06-15 LAB
ALBUMIN SERPL-MCNC: 3.4 G/DL (ref 3.4–5)
ANION GAP SERPL CALCULATED.3IONS-SCNC: 14 MMOL/L (ref 3–16)
ANISOCYTOSIS BLD QL SMEAR: ABNORMAL
BASOPHILS # BLD: 0 K/UL (ref 0–0.2)
BASOPHILS NFR BLD: 0.5 %
BUN SERPL-MCNC: 54 MG/DL (ref 7–20)
CALCIUM SERPL-MCNC: 8.1 MG/DL (ref 8.3–10.6)
CHLORIDE SERPL-SCNC: 94 MMOL/L (ref 99–110)
CO2 SERPL-SCNC: 20 MMOL/L (ref 21–32)
CREAT SERPL-MCNC: 10.5 MG/DL (ref 0.6–1.2)
DEPRECATED RDW RBC AUTO: 20.3 % (ref 12.4–15.4)
EOSINOPHIL # BLD: 0.9 K/UL (ref 0–0.6)
EOSINOPHIL NFR BLD: 9.5 %
GFR SERPLBLD CREATININE-BSD FMLA CKD-EPI: 4 ML/MIN/{1.73_M2}
GLUCOSE BLD-MCNC: 113 MG/DL (ref 70–99)
GLUCOSE BLD-MCNC: 92 MG/DL (ref 70–99)
GLUCOSE SERPL-MCNC: 89 MG/DL (ref 70–99)
HCT VFR BLD AUTO: 34.7 % (ref 36–48)
HGB BLD-MCNC: 11.6 G/DL (ref 12–16)
LYMPHOCYTES # BLD: 1.4 K/UL (ref 1–5.1)
LYMPHOCYTES NFR BLD: 14.7 %
MCH RBC QN AUTO: 30.5 PG (ref 26–34)
MCHC RBC AUTO-ENTMCNC: 33.6 G/DL (ref 31–36)
MCV RBC AUTO: 90.8 FL (ref 80–100)
MONOCYTES # BLD: 0.7 K/UL (ref 0–1.3)
MONOCYTES NFR BLD: 7.2 %
NEUTROPHILS # BLD: 6.5 K/UL (ref 1.7–7.7)
NEUTROPHILS NFR BLD: 68.1 %
OVALOCYTES BLD QL SMEAR: ABNORMAL
PERFORMED ON: ABNORMAL
PERFORMED ON: NORMAL
PHOSPHATE SERPL-MCNC: 5.9 MG/DL (ref 2.5–4.9)
PLATELET # BLD AUTO: 142 K/UL (ref 135–450)
PLATELET BLD QL SMEAR: ADEQUATE
PMV BLD AUTO: 7 FL (ref 5–10.5)
POTASSIUM SERPL-SCNC: 5.5 MMOL/L (ref 3.5–5.1)
RBC # BLD AUTO: 3.82 M/UL (ref 4–5.2)
SCHISTOCYTES BLD QL SMEAR: ABNORMAL
SLIDE REVIEW: ABNORMAL
SODIUM SERPL-SCNC: 128 MMOL/L (ref 136–145)
WBC # BLD AUTO: 9.5 K/UL (ref 4–11)

## 2025-06-15 PROCEDURE — 1200000000 HC SEMI PRIVATE

## 2025-06-15 PROCEDURE — 2500000003 HC RX 250 WO HCPCS: Performed by: HOSPITALIST

## 2025-06-15 PROCEDURE — 6370000000 HC RX 637 (ALT 250 FOR IP): Performed by: HOSPITALIST

## 2025-06-15 PROCEDURE — 6370000000 HC RX 637 (ALT 250 FOR IP): Performed by: INTERNAL MEDICINE

## 2025-06-15 PROCEDURE — 85025 COMPLETE CBC W/AUTO DIFF WBC: CPT

## 2025-06-15 PROCEDURE — 6370000000 HC RX 637 (ALT 250 FOR IP): Performed by: NURSE PRACTITIONER

## 2025-06-15 PROCEDURE — 80069 RENAL FUNCTION PANEL: CPT

## 2025-06-15 PROCEDURE — 36415 COLL VENOUS BLD VENIPUNCTURE: CPT

## 2025-06-15 PROCEDURE — 99233 SBSQ HOSP IP/OBS HIGH 50: CPT | Performed by: INTERNAL MEDICINE

## 2025-06-15 RX ADMIN — ASPIRIN 81 MG: 81 TABLET, COATED ORAL at 08:18

## 2025-06-15 RX ADMIN — METOPROLOL SUCCINATE 50 MG: 50 TABLET, EXTENDED RELEASE ORAL at 21:18

## 2025-06-15 RX ADMIN — AMIODARONE HYDROCHLORIDE 200 MG: 200 TABLET ORAL at 08:19

## 2025-06-15 RX ADMIN — TORSEMIDE 40 MG: 20 TABLET ORAL at 08:18

## 2025-06-15 RX ADMIN — SEVELAMER CARBONATE 1600 MG: 800 TABLET, FILM COATED ORAL at 17:10

## 2025-06-15 RX ADMIN — SEVELAMER CARBONATE 1600 MG: 800 TABLET, FILM COATED ORAL at 11:50

## 2025-06-15 RX ADMIN — ACETAMINOPHEN 650 MG: 325 TABLET ORAL at 21:16

## 2025-06-15 RX ADMIN — OXYCODONE 5 MG: 5 TABLET ORAL at 21:16

## 2025-06-15 RX ADMIN — CETIRIZINE HYDROCHLORIDE 5 MG: 10 TABLET, FILM COATED ORAL at 08:18

## 2025-06-15 RX ADMIN — SEVELAMER CARBONATE 1600 MG: 800 TABLET, FILM COATED ORAL at 08:18

## 2025-06-15 RX ADMIN — NIFEDIPINE 60 MG: 30 TABLET, EXTENDED RELEASE ORAL at 08:17

## 2025-06-15 RX ADMIN — ATORVASTATIN CALCIUM 40 MG: 40 TABLET, FILM COATED ORAL at 21:16

## 2025-06-15 RX ADMIN — AMITRIPTYLINE HYDROCHLORIDE 25 MG: 25 TABLET, FILM COATED ORAL at 21:18

## 2025-06-15 RX ADMIN — SODIUM CHLORIDE, PRESERVATIVE FREE 10 ML: 5 INJECTION INTRAVENOUS at 08:20

## 2025-06-15 RX ADMIN — AMIODARONE HYDROCHLORIDE 200 MG: 200 TABLET ORAL at 21:16

## 2025-06-15 RX ADMIN — OXYCODONE 5 MG: 5 TABLET ORAL at 07:24

## 2025-06-15 RX ADMIN — PANTOPRAZOLE SODIUM 40 MG: 40 TABLET, DELAYED RELEASE ORAL at 07:24

## 2025-06-15 RX ADMIN — PANTOPRAZOLE SODIUM 40 MG: 40 TABLET, DELAYED RELEASE ORAL at 17:10

## 2025-06-15 RX ADMIN — SODIUM ZIRCONIUM CYCLOSILICATE 10 G: 10 POWDER, FOR SUSPENSION ORAL at 13:59

## 2025-06-15 ASSESSMENT — PAIN DESCRIPTION - LOCATION
LOCATION: BACK
LOCATION: BACK

## 2025-06-15 ASSESSMENT — PAIN DESCRIPTION - ONSET
ONSET: ON-GOING
ONSET: ON-GOING

## 2025-06-15 ASSESSMENT — PAIN SCALES - GENERAL
PAINLEVEL_OUTOF10: 0
PAINLEVEL_OUTOF10: 9
PAINLEVEL_OUTOF10: 8

## 2025-06-15 ASSESSMENT — PAIN - FUNCTIONAL ASSESSMENT
PAIN_FUNCTIONAL_ASSESSMENT: PREVENTS OR INTERFERES SOME ACTIVE ACTIVITIES AND ADLS
PAIN_FUNCTIONAL_ASSESSMENT: PREVENTS OR INTERFERES SOME ACTIVE ACTIVITIES AND ADLS

## 2025-06-15 ASSESSMENT — PAIN DESCRIPTION - FREQUENCY
FREQUENCY: CONTINUOUS
FREQUENCY: CONTINUOUS

## 2025-06-15 ASSESSMENT — PAIN DESCRIPTION - ORIENTATION
ORIENTATION: LOWER
ORIENTATION: LOWER

## 2025-06-15 ASSESSMENT — PAIN DESCRIPTION - PAIN TYPE
TYPE: ACUTE PAIN;CHRONIC PAIN
TYPE: ACUTE PAIN;CHRONIC PAIN

## 2025-06-15 ASSESSMENT — PAIN DESCRIPTION - DESCRIPTORS
DESCRIPTORS: ACHING
DESCRIPTORS: ACHING

## 2025-06-15 NOTE — PROGRESS NOTES
aorta appears patent. Celiac artery and branches are opacified. Superior mesenteric artery and major branches are opacified. Bilateral renal arteries are opacified demonstrating mild to moderate atherosclerotic calcification. Infrarenal aortic aneurysm identified with dimensions currently measuring 5.4 x 6.1 cm, mildly increased from previous study. Endograft identified with bifurcated configuration. Limbs of the endograft appear patent. At the right lateral aspect of the aneurysm sac there is curvilinear hyperdensity identified on both precontrast and postcontrast imaging that favors thrombotic calcification. Bilateral common iliac segments are patent. Bilateral external iliac arteries are patent. Bilateral hypogastric arteries are patent. LIVER: Normal size and density. No focal abnormality. GALLBLADDER AND BILE DUCTS: Status post cholecystectomy. PANCREAS: Normal. SPLEEN: Normal. ADRENAL GLANDS: Normal. KIDNEYS: Right kidney demonstrates small size. Cortical hypodense lesions favor simple cysts. No evidence of hydronephrosis. Large left renal anterior cystic lesion with multiple wall calcifications identified. Dimensions currently measure 12.8 x 12.6 cm. Finding appears similar to the previous examination. BOWEL: Stomach and duodenum appear normal in configuration. Small intestinal loops are normal in diameter. The colon is normal in configuration. Moderate colonic fecal retention identified. No pathologic wall thickening. No evidence of intestinal obstruction. The appendix is not confidently visualized. PERITONEUM/RETROPERITONEUM: No ascites. No free air. LYMPH NODES: No adenopathy. ABDOMINAL WALL: Normal. URINARY BLADDER: Normal. REPRODUCTIVE ORGANS: Uterus appears normal in size and density. BONES: No acute abnormalities.     1. Patent aortic endograft. No current evidence of endoleak. 2. Abdominal aortic aneurysm currently measures up to 6.1 cm diameter, mildly enlarged from previous study. 3. Large left  renal complex cyst. Imaging characteristics are most consistent with Bosniak 2F renal cyst. Electronically signed by Dhruv Kinney      CBC:   Recent Labs     06/12/25  0732 06/13/25  0721 06/14/25  0724   WBC 9.8 9.2 8.2   HGB 12.1 12.0 11.7*    148 122*     BMP:    Recent Labs     06/13/25  0721 06/14/25  0724 06/14/25  1705   * 129* 129*   K 6.0* 5.2* 5.2*   CL 93* 95* 94*   CO2 20* 20* 20*   BUN 67* 43* 47*   CREATININE 11.5* 8.7* 9.8*   GLUCOSE 83 70 104*     Hepatic:   No results for input(s): \"AST\", \"ALT\", \"BILITOT\", \"ALKPHOS\" in the last 72 hours.    Invalid input(s): \"ALB\"    Lipids:   Lab Results   Component Value Date/Time    CHOL 170 06/10/2019 12:12 PM    HDL 40 06/10/2019 12:12 PM    TRIG 132 06/10/2019 12:12 PM     Hemoglobin A1C:   Lab Results   Component Value Date/Time    LABA1C 4.8 11/04/2024 02:36 AM     TSH:   Lab Results   Component Value Date/Time    TSH 0.78 07/25/2020 10:59 AM     Troponin: No results found for: \"TROPONINT\"  Lactic Acid: No results for input(s): \"LACTA\" in the last 72 hours.  BNP:   No results for input(s): \"PROBNP\" in the last 72 hours.    UA:  Lab Results   Component Value Date/Time    NITRU Negative 06/10/2025 02:08 PM    COLORU Yellow 06/10/2025 02:08 PM    PHUR 6.0 06/10/2025 02:08 PM    PHUR 8.0 09/08/2023 08:15 AM    WBCUA  06/10/2025 02:08 PM    RBCUA 3-4 06/10/2025 02:08 PM    MUCUS 2+ 05/29/2021 06:00 AM    BACTERIA 4+ 06/10/2025 02:08 PM    CLARITYU SL CLOUDY 06/10/2025 02:08 PM    LEUKOCYTESUR LARGE 06/10/2025 02:08 PM    UROBILINOGEN 0.2 06/10/2025 02:08 PM    BILIRUBINUR Negative 06/10/2025 02:08 PM    BLOODU SMALL 06/10/2025 02:08 PM    GLUCOSEU Negative 06/10/2025 02:08 PM    KETUA Negative 06/10/2025 02:08 PM    AMORPHOUS 1+ 07/24/2020 11:24 PM     Urine Cultures:   Lab Results   Component Value Date/Time    LABURIN  06/10/2025 02:07 PM     <50,000 CFU/ml mixed skin/urogenital betsy. No further workup    LABURIN  06/10/2025 02:07 PM

## 2025-06-15 NOTE — PROGRESS NOTES
Urology Attending Progress Note      Subjective: \"I'm not feeling well\"    Vitals:  BP (!) 128/59   Pulse 61   Temp 98.6 °F (37 °C) (Oral)   Resp 14   Ht 1.702 m (5' 7\")   Wt 113 kg (249 lb 1.9 oz)   SpO2 98%   BMI 39.02 kg/m²   Temp  Av.5 °F (36.9 °C)  Min: 98.2 °F (36.8 °C)  Max: 98.6 °F (37 °C)    Intake/Output Summary (Last 24 hours) at 6/15/2025 1617  Last data filed at 6/15/2025 1235  Gross per 24 hour   Intake 1080 ml   Output 400 ml   Net 680 ml       Exam: Laying in bed, NAD, appears drowsy    Labs:  WBC:    Lab Results   Component Value Date/Time    WBC 9.5 06/15/2025 07:48 AM     Hemoglobin/Hematocrit:    Lab Results   Component Value Date/Time    HGB 11.6 06/15/2025 07:48 AM    HCT 34.7 06/15/2025 07:48 AM     BMP:    Lab Results   Component Value Date/Time     06/15/2025 07:48 AM    K 5.5 06/15/2025 07:48 AM    K 5.2 2025 05:05 PM    CL 94 06/15/2025 07:48 AM    CO2 20 06/15/2025 07:48 AM    BUN 54 06/15/2025 07:48 AM    CREATININE 10.5 06/15/2025 07:48 AM    CALCIUM 8.1 06/15/2025 07:48 AM    GFRAA 8 2022 02:57 PM    LABGLOM 4 06/15/2025 07:48 AM    LABGLOM 6 2023 11:26 AM     PT/INR:    Lab Results   Component Value Date/Time    PROTIME 13.7 2025 07:21 AM    INR 1.02 2025 07:21 AM     PTT:    Lab Results   Component Value Date/Time    APTT 32.1 2024 02:36 AM   [APT    Impression/Plan: 70yo F with 12.8cm L renal cyst and ESBL Ecoli UTI. We were consulted for recs regarding her renal cyst.    -Attempted to discuss cyst aspiration with patient today, she said she was not feeling well and wants to talk about it again tomorrow  -I did advise her that I placed orders for the procedure to occur tomorrow  -NPO midnight for possible Left renal cyst aspiration with injection of sclerosing agent  -Call with any questions     Cayla Kline PA-C

## 2025-06-15 NOTE — PLAN OF CARE
Urology called back because patient now wants to pursue cyst aspiration with IR, will discuss with patient today and tomorrow on rounds. NPO midnight. Will ask if sclerosing agent can be given at time of aspiration.    [facility]  [facility]

## 2025-06-15 NOTE — PROGRESS NOTES
Nephrology Progress Note                                                                                                                                                                                                                                                                                                                                                               Office : [phone]     Fax :[phone]    Patient's Name: [place] Saleem  6/15/2025    Reason for Consult:  ESRD on HD   Requesting Physician:  Kim Liu MD  Chief Complaint:    Chief Complaint   Patient presents with    Shortness of Breath     Pt presents to ED from dialysis d/t low heart rate and c/o SOB. Pt did not receive HD tx today. Reports that her eyes and nose have been burning d/t the air at facility she lives. Pt c/o dizziness and chest tightness.       Assessment/Plan     # ESRD on HD   - Dialyzes every TTS as an outpatient  - Plan for HD tomorrow   - Continue Torsemide as patient still urinates   - Renally dose meds  - Monitor renal labs     # HTN  - BP's currently acceptable  - On Metoprolol & Nifedipine  - Monitor     # Anemia of chronic disease   - Hgb at goal for ESRD  - Monitor     # Acid- base/ Electrolyte imbalance   - HD schedule, as above   - Low K diet   - Lokelma x 1 dose     # MBD management  # H/o parathyroidectomy  - Sevelamer with meals  - Low phos diet   - Calcitriol 1 mcg BID     # Abdominal pain  # Left complex renal cyst   # H/o AAA s/p repair  - CTA - evidence of endo leak. Will ultimately need ablation   - Vascular surgery on board ; no acute intervention at this time  - Urology consulted to further evaluate cyst - pt doesn't want aspiration here     # Sinus bradycardia  # PAF  - On BB with holding parameters. CCB on hold  - On Amiodarone    # Possible UTI  # H/o ESBL UTI  - Abx management per primary        History of Present Ilness:    [place] Saleem is a 69 y.o. female with a PMH of ESRD on HD,  HTN, HLD, PCKD, DM2, AAA s/p repair, who presented to the ER with multiple complaints. Reports SOB, cough, and wheezing. She also reported dysuria, CP, and epigastric pain. She went to outpatient HD yesterday but was having low HR and SOB so she was sent to the ER for further evaluation. She was not able to complete her outpatient dialysis session. Patient is being admitted for further evaluation and management. We are consulted for ESRD and HD management.     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     Rash     Uterine prolapse     Venous stasis        Past Surgical History:   Procedure Laterality Date    ABDOMINAL AORTIC ANEURYSM REPAIR, ENDOVASCULAR N/A 09/08/2023    ABDOMINAL AORTIC ANEURYSM REPAIR ENDOVASCULAR performed by Sofiya Alonzo MD at [facility] OR    APPENDECTOMY      CHOLECYSTECTOMY      COLONOSCOPY N/A 11/5/2024    COLONOSCOPY BIOPSY/ STOOL ASPIRATE performed by Laura Roberson MD at [facility] ENDOSCOPY    IR TUNNELED CVC PLACE WO SQ PORT/PUMP > 5 YEARS  06/01/2021    IR TUNNELED CATHETER PLACEMENT GREATER THAN 5 YEARS 6/1/2021 [facility] SPECIAL PROCEDURES    KIDNEY CYST REMOVAL         Family History   Problem Relation Age of Onset    Unknown Mother     Unknown Father         reports that she has been smoking cigarettes. She has a 25 pack-year smoking history. She has never used smokeless tobacco. She reports that she does not currently use alcohol. She reports that she does not use drugs.    Allergies:  Abilify [aripiprazole], Baclofen, Celecoxib,

## 2025-06-15 NOTE — PLAN OF CARE
Problem: Chronic Conditions and Co-morbidities  Goal: Patient's chronic conditions and co-morbidity symptoms are monitored and maintained or improved  Outcome: Progressing     Problem: Pain  Goal: Verbalizes/displays adequate comfort level or baseline comfort level  6/15/2025 0202 by Allyn Kinney RN  Outcome: Progressing  Verbalizes/displays adequate comfort level or baseline comfort level:   Encourage patient to monitor pain and request assistance   Administer analgesics based on type and severity of pain and evaluate response   Assess pain using appropriate pain scale   Implement non-pharmacological measures as appropriate and evaluate response  Note: Managing pain with medications per MAR. Call light left within reach. Plan of care continues.     Problem: Skin/Tissue Integrity  Goal: Skin integrity remains intact  Description: 1.  Monitor for areas of redness and/or skin breakdown2.  Assess vascular access sites hourly3.  Every 4-6 hours minimum:  Change oxygen saturation probe site4.  Every 4-6 hours:  If on nasal continuous positive airway pressure, respiratory therapy assess nares and determine need for appliance change or resting period  6/15/2025 0202 by Allyn Kinney, RN  Outcome: Progressing    Problem: Safety - Adult  Goal: Free from fall injury  6/15/2025 0202 by Allyn Kinney, RN  Outcome: Progressing  Note: Fall precautions in place. Call light left within reach. Plan of care continues.

## 2025-06-15 NOTE — PROGRESS NOTES
Encourage to turn and reposition the pt at this time, but the patient states she doesn't want to be turned and that she is comfortable how she is. Pt states she is tired and wants to nap, pt denies any pain when asked. Offered to bath the patient several times today, and each time she said \"not now.\" Pt educated on the importance of repositioning, but still wants to be left the way she is. Call light is in reach, I will continue to follow throughout the shift. Liz Quiroga RN

## 2025-06-15 NOTE — PLAN OF CARE
Problem: Chronic Conditions and Co-morbidities  Goal: Patient's chronic conditions and co-morbidity symptoms are monitored and maintained or improved  6/15/2025 1024 by Liz Quiroga RN  Outcome: Progressing  6/15/2025 0202 by Allyn Kinney RN  Outcome: Progressing     Problem: Discharge Planning  Goal: Discharge to home or other facility with appropriate resources  6/15/2025 1024 by Liz Quiroga RN  Outcome: Progressing  6/15/2025 0202 by Allyn Kinney RN  Outcome: Progressing     Problem: Pain  Goal: Verbalizes/displays adequate comfort level or baseline comfort level  6/15/2025 1024 by Liz Quiroga RN  Outcome: Progressing  6/15/2025 0202 by Allyn Kinney RN  Outcome: Progressing     Problem: Skin/Tissue Integrity  Goal: Skin integrity remains intact  Description: 1.  Monitor for areas of redness and/or skin breakdown2.  Assess vascular access sites hourly3.  Every 4-6 hours minimum:  Change oxygen saturation probe site4.  Every 4-6 hours:  If on nasal continuous positive airway pressure, respiratory therapy assess nares and determine need for appliance change or resting period  6/15/2025 1024 by Liz Quiroga RN  Outcome: Progressing  6/15/2025 0202 by Allyn Kinney RN  Outcome: Progressing     Problem: Safety - Adult  Goal: Free from fall injury  6/15/2025 1024 by Liz Quiroga RN  Outcome: Progressing  6/15/2025 0202 by Allyn Kinney RN  Outcome: Progressing     Problem: ABCDS Injury Assessment  Goal: Absence of physical injury  6/15/2025 1024 by Liz Quiroga RN  Outcome: Progressing  6/15/2025 0202 by Allyn Kinney RN  Outcome: Progressing

## 2025-06-15 NOTE — PROGRESS NOTES
Pt drowsy to alert, oriented x4 with some STM and intermittent confusion. Pain being managed per MAR. VSS on room air. Voids via purewick or up x2 stand-pivot to commode, large BM this shift. Standard safety measures in place.

## 2025-06-15 NOTE — PROGRESS NOTES
V2.0    [facility] Progress Note      Name:  [place] Saleem /Age/Sex: 1955  (69 y.o. female)   MRN & CSN:  [id number] & [id number] Encounter Date/Time: 2025 10:37 AM EDT   Location:  [facility]-01 PCP: Kim Liu MD     Attending:Mark Keys MD       Hospital Day: 6    Assessment and Recommendations     Patient Active Problem List   Diagnosis    Gout    ACS (acute coronary syndrome) (HCC)    Acute renal failure    ARF (acute renal failure)    Essential hypertension    Anemia of chronic renal failure, stage 5 ([facility])    Electrolyte imbalance    Secondary hypertension    AMS (altered mental status)    Acute pancreatitis    Adjustment disorder with mixed disturbance of emotions and conduct    Anxiety    Arthritis of knee, right    Delusional ideas (HCC)    Paranoid (HCC)    Disorder of bladder    Encephalopathy    Generalized weakness    Diabetic ulcer of left heel, limited to breakdown of skin (HCC)    Heel ulceration ([facility])    Postmenopausal bleeding    Psychotic disorder ([facility])    Renal cyst    Uterovaginal prolapse, incomplete    Acute-on-chronic kidney injury    Transient alteration of awareness    AAA (abdominal aortic aneurysm) without rupture    ESRD (end stage renal disease) ([facility])    Enlarging abdominal aortic aneurysm (AAA)    Other emphysema (HCC)    Dyslipidemia    Diabetes mellitus type 2, insulin dependent (HCC)    Hyponatremia with excess extracellular fluid volume    Hyperkalemia    Acute colitis    Rectal bleed    Chronic kidney disease-mineral and bone disorder (CKD-MBD)    Anemia of chronic renal failure    Ischemic colitis    Encounter for palliative care    Chronic diastolic congestive heart failure (HCC)    UTI due to extended-spectrum beta lactamase (ESBL) producing Escherichia coli       69-year-old lady who is a nursing home resident/wheelchair-bound known case of end-stage renal disease on hemodialysis, hypertension, hyperlipidemia, gout, polycystic kidney disease, and history of    Genitourinary: no suprapubic tenderness  Musculoskeletal: No edema  Skin: warm, dry  Neuro: Alert.  Psych: Mood appropriate.         Medications:   Medications:    furosemide  40 mg IntraVENous Once    NIFEdipine  60 mg Oral Daily    amiodarone  200 mg Oral BID    aspirin  81 mg Oral Daily    atorvastatin  40 mg Oral Nightly    cetirizine  5 mg Oral Daily    fluticasone  1 spray Each Nostril Daily    metoprolol succinate  50 mg Oral Nightly    sevelamer  1,600 mg Oral TID WC    torsemide  40 mg Oral QAM    sodium chloride flush  5-40 mL IntraVENous 2 times per day    [Held by provider] heparin (porcine)  5,000 Units SubCUTAneous 3 times per day    pantoprazole  40 mg Oral BID AC      Infusions:    dextrose      sodium chloride       PRN Meds: glucose, 4 tablet, PRN  dextrose bolus, 125 mL, PRN   Or  dextrose bolus, 250 mL, PRN  glucagon (rDNA), 1 mg, PRN  dextrose, , Continuous PRN  amitriptyline, 25 mg, Nightly PRN  oxyCODONE, 2.5 mg, Once PRN  ipratropium 0.5 mg-albuterol 2.5 mg, 1 Dose, Q4H PRN  oxyCODONE, 5 mg, Q6H PRN  diphenhydrAMINE, 25 mg, Q4H PRN  sodium chloride flush, 5-40 mL, PRN  sodium chloride, , PRN  polyethylene glycol, 17 g, Daily PRN  acetaminophen, 650 mg, Q6H PRN   Or  acetaminophen, 650 mg, Q6H PRN  metoclopramide, 5 mg, BID PRN        Labs and Imaging   CTA CHEST ABDOMEN PELVIS W CONTRAST  Result Date: 6/11/2025  CTA CHEST ABDOMEN PELVIS W CONTRAST Indication: further eval of SOB. Abd pain - furthe eval of AAA shortness of breath abdominal pain abdominal aortic aneurysm. Technique: Helical CT images of the chest, abdomen and pelvis were acquired after the administration of intravenous contrast media. Axial, coronal and sagittal reformatted images were constructed from the raw data set. 3-dimensional vascular reconstructions were provided for review. Individualized dose optimization technique was used in order to meet ALARA standards for radiation dose reduction.  In addition to vendor

## 2025-06-16 ENCOUNTER — APPOINTMENT (OUTPATIENT)
Dept: CT IMAGING | Age: 70
End: 2025-06-16
Payer: MEDICAID

## 2025-06-16 PROBLEM — Z86.79 STATUS POST ENDOVASCULAR ANEURYSM REPAIR (EVAR): Status: ACTIVE | Noted: 2025-06-16

## 2025-06-16 PROBLEM — Z98.890 STATUS POST ENDOVASCULAR ANEURYSM REPAIR (EVAR): Status: ACTIVE | Noted: 2025-06-16

## 2025-06-16 PROBLEM — I97.89 TYPE II ENDOLEAK OF AORTIC GRAFT: Status: ACTIVE | Noted: 2025-06-16

## 2025-06-16 LAB
GLUCOSE BLD-MCNC: 100 MG/DL (ref 70–99)
GLUCOSE BLD-MCNC: 93 MG/DL (ref 70–99)
PERFORMED ON: ABNORMAL
PERFORMED ON: NORMAL

## 2025-06-16 PROCEDURE — 99233 SBSQ HOSP IP/OBS HIGH 50: CPT | Performed by: INTERNAL MEDICINE

## 2025-06-16 PROCEDURE — 6370000000 HC RX 637 (ALT 250 FOR IP): Performed by: HOSPITALIST

## 2025-06-16 PROCEDURE — 88305 TISSUE EXAM BY PATHOLOGIST: CPT

## 2025-06-16 PROCEDURE — 0T913ZX DRAINAGE OF LEFT KIDNEY, PERCUTANEOUS APPROACH, DIAGNOSTIC: ICD-10-PCS | Performed by: RADIOLOGY

## 2025-06-16 PROCEDURE — 2500000003 HC RX 250 WO HCPCS: Performed by: HOSPITALIST

## 2025-06-16 PROCEDURE — 6360000002 HC RX W HCPCS: Performed by: RADIOLOGY

## 2025-06-16 PROCEDURE — 88173 CYTOPATH EVAL FNA REPORT: CPT

## 2025-06-16 PROCEDURE — 2060000000 HC ICU INTERMEDIATE R&B

## 2025-06-16 PROCEDURE — 2709999900 CT GUIDED RENAL CYST ASPIRATION

## 2025-06-16 PROCEDURE — 1200000000 HC SEMI PRIVATE

## 2025-06-16 PROCEDURE — 90935 HEMODIALYSIS ONE EVALUATION: CPT

## 2025-06-16 RX ORDER — SODIUM CHLORIDE, SODIUM LACTATE, POTASSIUM CHLORIDE, CALCIUM CHLORIDE 600; 310; 30; 20 MG/100ML; MG/100ML; MG/100ML; MG/100ML
INJECTION, SOLUTION INTRAVENOUS CONTINUOUS
OUTPATIENT
Start: 2025-06-16

## 2025-06-16 RX ORDER — LIDOCAINE HYDROCHLORIDE 10 MG/ML
INJECTION, SOLUTION EPIDURAL; INFILTRATION; INTRACAUDAL; PERINEURAL PRN
Status: DISCONTINUED | OUTPATIENT
Start: 2025-06-16 | End: 2025-06-20 | Stop reason: HOSPADM

## 2025-06-16 RX ORDER — MIDAZOLAM HYDROCHLORIDE 1 MG/ML
INJECTION, SOLUTION INTRAMUSCULAR; INTRAVENOUS PRN
Status: DISCONTINUED | OUTPATIENT
Start: 2025-06-16 | End: 2025-06-20 | Stop reason: HOSPADM

## 2025-06-16 RX ADMIN — ACETAMINOPHEN 650 MG: 325 TABLET ORAL at 15:45

## 2025-06-16 RX ADMIN — MIDAZOLAM HYDROCHLORIDE 1 MG: 1 INJECTION, SOLUTION INTRAMUSCULAR; INTRAVENOUS at 10:14

## 2025-06-16 RX ADMIN — METOPROLOL SUCCINATE 50 MG: 50 TABLET, EXTENDED RELEASE ORAL at 22:40

## 2025-06-16 RX ADMIN — PANTOPRAZOLE SODIUM 40 MG: 40 TABLET, DELAYED RELEASE ORAL at 16:47

## 2025-06-16 RX ADMIN — ATORVASTATIN CALCIUM 40 MG: 40 TABLET, FILM COATED ORAL at 22:40

## 2025-06-16 RX ADMIN — OXYCODONE 5 MG: 5 TABLET ORAL at 10:53

## 2025-06-16 RX ADMIN — SEVELAMER CARBONATE 1600 MG: 800 TABLET, FILM COATED ORAL at 16:46

## 2025-06-16 RX ADMIN — OXYCODONE 5 MG: 5 TABLET ORAL at 03:23

## 2025-06-16 RX ADMIN — SODIUM CHLORIDE, PRESERVATIVE FREE 10 ML: 5 INJECTION INTRAVENOUS at 23:47

## 2025-06-16 RX ADMIN — AMIODARONE HYDROCHLORIDE 200 MG: 200 TABLET ORAL at 22:40

## 2025-06-16 RX ADMIN — OXYCODONE 5 MG: 5 TABLET ORAL at 16:47

## 2025-06-16 RX ADMIN — LIDOCAINE HYDROCHLORIDE 15 ML: 10 INJECTION, SOLUTION EPIDURAL; INFILTRATION; INTRACAUDAL; PERINEURAL at 10:15

## 2025-06-16 RX ADMIN — OXYCODONE 5 MG: 5 TABLET ORAL at 22:40

## 2025-06-16 ASSESSMENT — PAIN DESCRIPTION - PAIN TYPE
TYPE: ACUTE PAIN;CHRONIC PAIN
TYPE: ACUTE PAIN;CHRONIC PAIN

## 2025-06-16 ASSESSMENT — PAIN DESCRIPTION - DESCRIPTORS
DESCRIPTORS: ACHING
DESCRIPTORS: OTHER (COMMENT)
DESCRIPTORS: ACHING

## 2025-06-16 ASSESSMENT — PAIN SCALES - GENERAL
PAINLEVEL_OUTOF10: 6
PAINLEVEL_OUTOF10: 7
PAINLEVEL_OUTOF10: 10
PAINLEVEL_OUTOF10: 8
PAINLEVEL_OUTOF10: 9

## 2025-06-16 ASSESSMENT — PAIN - FUNCTIONAL ASSESSMENT
PAIN_FUNCTIONAL_ASSESSMENT: ACTIVITIES ARE NOT PREVENTED
PAIN_FUNCTIONAL_ASSESSMENT: PREVENTS OR INTERFERES SOME ACTIVE ACTIVITIES AND ADLS
PAIN_FUNCTIONAL_ASSESSMENT: ACTIVITIES ARE NOT PREVENTED

## 2025-06-16 ASSESSMENT — PAIN DESCRIPTION - ORIENTATION
ORIENTATION: OTHER (COMMENT)
ORIENTATION: MID
ORIENTATION: LOWER

## 2025-06-16 ASSESSMENT — PAIN DESCRIPTION - LOCATION
LOCATION: BACK
LOCATION: BACK
LOCATION: OTHER (COMMENT)
LOCATION: BACK
LOCATION: BACK

## 2025-06-16 ASSESSMENT — PAIN DESCRIPTION - FREQUENCY
FREQUENCY: CONTINUOUS
FREQUENCY: CONTINUOUS

## 2025-06-16 ASSESSMENT — PAIN DESCRIPTION - ONSET
ONSET: ON-GOING
ONSET: ON-GOING

## 2025-06-16 NOTE — CARE COORDINATION
DISCHARGE PLANNING:  Chart reviewed.  Patient is from [facility] as a long term care resident. She receives HD on TTS at [facility]. She is wheelchair bound.     Current discharge plan is to return to [facility] as a LTC resident (no precert needed) with resumption of HD at [facility].     Vascular recommended ablation and plans to discuss with IR today.  Urology following.    CM team will continue to follow.  Myra Kidd RN Case Manager  [phone]

## 2025-06-16 NOTE — PROGRESS NOTES
Urology Attending Progress Note      Subjective: Patient nervous about upcoming procedure. No  complaints    Vitals:  /66   Pulse 65   Temp 98.1 °F (36.7 °C)   Resp 17   Ht 1.702 m (5' 7\")   Wt 113.7 kg (250 lb 10.6 oz)   SpO2 100%   BMI 39.26 kg/m²   Temp  Av.3 °F (36.8 °C)  Min: 97.9 °F (36.6 °C)  Max: 98.8 °F (37.1 °C)    Intake/Output Summary (Last 24 hours) at 2025 0906  Last data filed at 2025 0330  Gross per 24 hour   Intake 720 ml   Output 1000 ml   Net -280 ml       Exam: Laying in bed, NAD.     Labs:  WBC:    Lab Results   Component Value Date/Time    WBC 9.5 06/15/2025 07:48 AM     Hemoglobin/Hematocrit:    Lab Results   Component Value Date/Time    HGB 11.6 06/15/2025 07:48 AM    HCT 34.7 06/15/2025 07:48 AM     BMP:    Lab Results   Component Value Date/Time     06/15/2025 07:48 AM    K 5.5 06/15/2025 07:48 AM    K 5.2 2025 05:05 PM    CL 94 06/15/2025 07:48 AM    CO2 20 06/15/2025 07:48 AM    BUN 54 06/15/2025 07:48 AM    CREATININE 10.5 06/15/2025 07:48 AM    CALCIUM 8.1 06/15/2025 07:48 AM    GFRAA 8 2022 02:57 PM    LABGLOM 4 06/15/2025 07:48 AM    LABGLOM 6 2023 11:26 AM     PT/INR:    Lab Results   Component Value Date/Time    PROTIME 13.7 2025 07:21 AM    INR 1.02 2025 07:21 AM     PTT:    Lab Results   Component Value Date/Time    APTT 32.1 2024 02:36 AM   [APT    Impression/Plan: 68yo F with 12.8cm L renal cyst and ESBL Ecoli UTI. We were consulted for recs regarding her renal cyst.    -I explained to her at bedside today the details of renal cyst aspiration. IR to get consent as well  -Keep NPO until procedure  -Call with any questions     MARK Nichole

## 2025-06-16 NOTE — PROGRESS NOTES
Patient is A&Ox4. VSS this shift. Patient has endorsed pain that has been managed per MAR and non-pharm measures. Patient is tolerating PO diet. Tolerating ambulation x 2  stand by assist when using the bedside commode. Voiding external catheter. Patient received Dialysis today. Patient had 1 BM this shift. Patient updated on plan of care. Fall and safety precautions in place, call light within reach.

## 2025-06-16 NOTE — PROGRESS NOTES
Drowsy to A&Ox4, intermittent confusion and STM at times. VSS on room air. CHG line care completed this shift. NPO since MN. Pain being managed per MAR. Pt frequently refuses turns despite RN encouragement. Voiding well via purewick. No OOB this shift, WC at baseline. Standard safety measures in place.

## 2025-06-16 NOTE — PROGRESS NOTES
Treatment time: 3 hrs    Net UF: 900 ml     Pre weight: 113.7 kg  Post weight: 112.8 kg     Access used: Rtdc  Access function:  tolerated well,  BFR 300ml/min     Medications or blood products given: heparin dwells     Regular outpatient schedule: MWF     Summary of response to treatment: Pt tolerated well. Pt remained stable throughout entire treatment and upon exiting the hemodialysis suite.      Copy of dialysis treatment record placed in chart, to be scanned into EMR.

## 2025-06-16 NOTE — PLAN OF CARE
Problem: Pain  Goal: Verbalizes/displays adequate comfort level or baseline comfort level  6/16/2025 0757 by Nona Horne, RN  Outcome: Progressing   Pt endorses pain - medication administered per MAR    Problem: Safety - Adult  Goal: Free from fall injury  6/16/2025 0757 by Nona Horne, RN  Outcome: Progressing   Standard safety measures in place - call light within reach

## 2025-06-16 NOTE — PROGRESS NOTES
V2.0    [facility] Progress Note      Name:  Florida Saleem /Age/Sex: 1955  (69 y.o. female)   MRN & CSN:  [id number] & [id number] Encounter Date/Time: 2025 11:40 AM EDT   Location:  5510/5510-01 PCP: Kim Liu MD     Attending:Cheryl Kaur MD       Hospital Day: 7    HPI:    Assessment and Recommendations     Hospital course:    Florida Saleem is a 69 y.o. female with pmh of ESRD, hypertension, hyperlipidemia, gout, polycystic kidney, psychosis, diabetes, AAA s/p repair, asthma/COPD who presents with multiple nonspecific symptoms.  Has had multiple ED admissions for shortness of breath cough and wheezing.  Patient was found to have ESBL from  and was started on Bactrim at that time.  Patient went for her dialysis and had some shortness of breath and was sent to the ED.  ED patient's heart rate was in the 40s to 60s, EKG showed sinus bradycardia with first-degree block along with UTI due to extended-spectrum beta lactamase (ESBL) producing Escherichia coli      Plan:       ESBL UTI  - Day     Abdominal pain with history of AAA s/p repair  -CT of the abdomen in  showed patent aortic endograft.  CTA concerning for possible endoleak  -Vascular surgery consulted, no intervention needed at this present.  Will possibly need ablation at a later date, possibly tomorrow    Asthma/COPD  - Home meds    ESRD with electrolyte abnormality  -Nephrology consulted for dialysis    Complex renal cyst with polycystic kidney disease  Urology consulted.  S/p cyst drainage on     Sinus bradycardia with paroxysmal A-fib  - Continue metoprolol and amiodarone    Hypertension  - Continue home medication     Past h/o Diabetes type 2  - Last A1c 4.8    Mood disorder  - Continue home    Elevated troponin  - Most likely secondary to ESRD possibly at a component of demand ischemia    Tobacco abuse    History of parathyroidectomy      I spent > 55  minutes in the care of this patient.  Over 50% of that time  was in face-to-face counseling regarding disease process, diagnostic testing, preventative measures, and answering patient and family questions.     Diet Diet NPO  ADULT DIET; Regular; Low Potassium (Less than 3000 mg/day); Low Phosphorus (Less than 1000 mg)   DVT Prophylaxis [] Lovenox, []  Heparin, [] SCDs, [] Ambulation,  [] Eliquis, [] Xarelto  [] Coumadin   Code Status Full Code   Disposition   1 to 2 days     Surrogate Decision Maker/ POA         MDM  [x] High (any 2 of A, B, or C)    A. Problems (any 1)  [x] Acute/Chronic Illness/injury posing threat to life or bodily function:    [] Severe exacerbation of chronic illness:    ---------------------------------------------------------------------  B. Risk of Treatment (any 1)   [] IV ABX requiring serial renal monitoring for nephrotoxicity:     [] IV Narcotic analgesia for adverse drug reaction  [] IV diuresis requiring serial monitoring for renal impairment and electrolyte derangements  [] Critical electrolyte abnormalities requiring IV replacement and close serial monitoring  [] Insulin - monitoring serial FSBS for Hypoglycemic adverse drug reaction  [] Anticoagulation requiring serial monitoring of coagulation factors  [] IV/IM Controlled Substances order (any 1)   [] One-time Order including Drug Name/Route, Reason Ordered:   [] Scheduled Order including Drug Name/Route, Reason Ordered or Continued:   [] PRN Order including Drug Name/Route, Reason Ordered or Continued:  []Other -   [] Decision to De-escalate Care this DOS due to change in treatment goals:  [] Decision to Escalate Care To:   [] Major Surgery/Procedure (any 1):   Elective with patient risk factors including Procedure Type and Risk Factors:   Emergent Procedure Type:    ----------------------------------------------------------------------  C. Data (any 2)  [x] Data Review (3+ points)  [x] Consultant Note reviewed with note date, specialty, and summary (1 point each) urology note reviewed from

## 2025-06-16 NOTE — PROGRESS NOTES
Vascular Surgery Daily Progress Note  Patient: Florida Saleem    CC: Type II endoleak    Subjective :  No acute events overnight.  Abdominal pain unchanged.    ROS: A 14 point review of systems was conducted, significant findings as noted above. All other systems negative.    Objective :   Infusions:   dextrose      sodium chloride          I/O:I/O last 3 completed shifts:  In: 1560 [P.O.:1560]  Out: 1400 [Urine:1400]           Wt Readings from Last 1 Encounters:   06/16/25 113.7 kg (250 lb 10.6 oz)       Exam:/70   Pulse 67   Temp 98 °F (36.7 °C) (Oral)   Resp 17   Ht 1.702 m (5' 7\")   Wt 113.7 kg (250 lb 10.6 oz)   SpO2 99%   BMI 39.26 kg/m²     General appearance: alert, in no apparent distress   Lungs: clear to auscultation bilaterally; no crackles, no rhonchi, no wheezes, no rubs  Heart: regular rate and rhythm; S1, S2 normal; no murmurs, no rubs, no gallops  Abdomen: soft, tender over left upper quadrant , non-distended; no guarding, no rigidity  Extremities: no open wounds or acute signs of infection    Pulses    PT DP   R +/+ +/+   L +/+ +/+    +, -/+ ,-/-       LABS:   Recent Labs     06/14/25  0724 06/15/25  0748   WBC 8.2 9.5   HGB 11.7* 11.6*   HCT 34.8* 34.7*   MCV 92.6 90.8   * 142        Recent Labs     06/14/25  0724 06/14/25  1705 06/15/25  0748   * 129* 128*   K 5.2* 5.2* 5.5*   CL 95* 94* 94*   CO2 20* 20* 20*   PHOS 5.1*  --  5.9*   BUN 43* 47* 54*   CREATININE 8.7* 9.8* 10.5*        No results for input(s): \"AST\", \"ALT\", \"BILIDIR\", \"BILITOT\", \"ALKPHOS\" in the last 72 hours.    Invalid input(s): \"ALB\"     No results for input(s): \"LIPASE\", \"AMYLASE\" in the last 72 hours.     Recent Labs     06/13/25  0721   INR 1.02      No results for input(s): \"CKTOTAL\", \"CKMB\", \"CKMBINDEX\", \"TROPONINI\" in the last 72 hours.    ASSESSMENT/PLAN: Florida Saleem is a 69 y.o. female  with Hx of HTN, HLD, DM, COPD, ESRD on hemodialysis (Tu/Th/Sat), parathyroidectomy (8/2024),

## 2025-06-16 NOTE — PROGRESS NOTES
IMAGING SERVICES NURSING PROGRESS NOTE    Procedure:  Renal Cyst aspiration  June 16, 2025  Florida Saleem      Allergies:    Allergies   Allergen Reactions    Abilify [Aripiprazole] Nausea And Vomiting    Baclofen Nausea And Vomiting    Celecoxib Nausea And Vomiting    Diclofenac Nausea And Vomiting    Fentanyl Rash    Hydrocodone Rash    Ibuprofen Nausea And Vomiting    Mirtazapine Nausea And Vomiting    Morphine Itching    Naproxen Nausea And Vomiting    Paroxetine Other (See Comments) and Hallucinations     nightmares    Penicillins Rash and Other (See Comments)     Pt has tolerated cefazolin (2023), ceftriaxone (2024) and cefdinir (2023) as an inpatient per chart review as of 4/17/25.      Quetiapine Fumarate Palpitations and Other (See Comments)     Tachycardia/insomnia    Sertraline Other (See Comments) and Hallucinations     nightmare    Trazodone And Nefazodone Hallucinations       Vitals:    06/16/25 1015   BP: (!) 150/73   Pulse: 62   Resp: 20   Temp:    SpO2: 96%       Recent lab work reviewed with MD: yes    Procedure explained to patient by MD: yes   Informed consent obtained:yes  Family with patient:no    Mental Status:  Normal  Readiness to learn:  Yes  Barriers to learning: No    Pain Assessment Pre-Procedure:  Pain Present:  yes: chronic back pain  Pain Score:  8  Pain Quality/Description:  Aching    Time out Procedure Verification with:  [x] RN  [x] Physician  [x] Patient  [x] Other: CT Technologist  Procedure site marked, if applicable:  Yes    Note: Patient arrived A & O x 4, breathing easily on room air, Spoke to Dr. Kelly prior to procedure.  Procedural sedation:  Versed:    1mg  Post Procedureal Note:  Patient tolerated procedure well.  Breathing easily on room air. Dr Castro aspirated 140mls clear filomena fluid - specimen sent to lab for cytology.  Report given to Filomena SCHNEIDER.  Patient transported in stable conditon to room 5510.    Pain Assessment Post-Procedure:  Pain Present:  yes:

## 2025-06-16 NOTE — PROCEDURES
PROCEDURE NOTE  Date: 6/16/2025   Name: Florida Saleem  YOB: 1955    Procedures        IR Brief Postoperative Note    Florida Saleem  YOB: 1955  [id number]    Pre-operative Diagnosis: L renal cyst, bosniak 2F    Post-operative Diagnosis: Same    Procedure: L renal cyst aspiration    Anesthesia: mod    Surgeons/Assistants: maral    Estimated Blood Loss: Minimal    Complications: none    Specimens: were obtained    See full procedure dictation to follow      Epi Castor MD MD  6/16/2025

## 2025-06-16 NOTE — H&P
Patient:  Florida Saleem   :   1955      Relevant clinical history, particularly as it involves the pending procedure, was reviewed and discussed.    The procedure including risks and benefits was discussed at length with the patient (or designated family member) and all questions were answered.  Informed consent to proceed with the procedure was given.    Vital signs were monitored and documented by the Radiology nurse.    Targeted physical examination  Heart : regular rate and rhythm  Lungs : clear, breathing easily  Condition : stable    [facility] nurses notes reviewed and agreed.    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     Rash     Uterine prolapse     Venous stasis        Past Surgical History:           Procedure Laterality Date    ABDOMINAL AORTIC ANEURYSM REPAIR, ENDOVASCULAR N/A 2023    ABDOMINAL AORTIC ANEURYSM REPAIR ENDOVASCULAR performed by Sofiya Alonzo MD at [facility] OR    APPENDECTOMY      CHOLECYSTECTOMY      COLONOSCOPY N/A 2024    COLONOSCOPY BIOPSY/ STOOL ASPIRATE performed by Laura Roberson MD at [facility] ENDOSCOPY    IR TUNNELED CVC PLACE WO SQ PORT/PUMP > 5 YEARS  2021    IR TUNNELED CATHETER PLACEMENT GREATER THAN 5 YEARS 2021 [facility] SPECIAL PROCEDURES    KIDNEY CYST REMOVAL         Allergies:  Abilify [aripiprazole], Baclofen, Celecoxib, Diclofenac, Fentanyl, Hydrocodone, Ibuprofen, Mirtazapine, Morphine, Naproxen, Paroxetine, Penicillins, Quetiapine fumarate, Sertraline, and Trazodone and nefazodone    Medications:   Home Meds  No current facility-administered medications on file prior to

## 2025-06-16 NOTE — PROGRESS NOTES
Nephrology Progress Note                                                                                                                                                                                                                                                                                                                                                               Office : [phone]     Fax :[phone]    Patient's Name: Florida Saleem  6/16/2025    Reason for Consult:  ESRD on HD   Requesting Physician:  Kim Liu MD  Chief Complaint:    Chief Complaint   Patient presents with    Shortness of Breath     Pt presents to ED from dialysis d/t low heart rate and c/o SOB. Pt did not receive HD tx today. Reports that her eyes and nose have been burning d/t the air at facility she lives. Pt c/o dizziness and chest tightness.       Assessment/Plan     # ESRD on HD   - Dialyzes every TTS as an outpatient  - Plan for HD today  - Continue Torsemide as patient still urinates   - Renally dose meds  - Monitor renal labs     # HTN  - BP's currently acceptable  - On Metoprolol & Nifedipine  - Monitor     # Anemia of chronic disease   - Hgb at goal for ESRD  - Monitor     # Acid- base/ Electrolyte imbalance   - HD schedule, as above   - Low K diet   - s/p Lokelma x 1 dose     # MBD management  # H/o parathyroidectomy  - Sevelamer with meals  - Low phos diet   - Calcitriol 1 mcg BID     # Abdominal pain  # Left complex renal cyst   # H/o AAA s/p repair  - CTA - evidence of endo leak. Will ultimately need ablation   - Vascular surgery on board ; no acute intervention at this time  - Urology consulted to further evaluate cyst - will proceed with cyst aspiration today     # Sinus bradycardia  # PAF  - On BB with holding parameters. CCB on hold  - On Amiodarone    # Possible UTI  # H/o ESBL UTI  - Abx management per primary        History of Present Ilness:    Florida Saleem is a 69 y.o. female with a PMH of ESRD    Final Result      CTA CHEST ABDOMEN PELVIS W CONTRAST   Final Result   Stable vascular findings including aortobiiliac stent graft and   dilation of the excluded aneurysmal sac to 6.1 cm. Findings, as described above,   are suggestive of an endoleak.      No acute vascular abnormality of the chest.      Stable complex left renal lesions, indeterminate. Follow-up MRI of the abdomen   without and with contrast is recommended.      Electronically signed by Epi Castro      XR CHEST PORTABLE   Final Result      1.  Negative portable chest radiograph.         Electronically signed by Mao Boothe      CT GUIDED RENAL CYST ASPIRATION    (Results Pending)   IR EMBOLIZATION HEMORRHAGE    (Results Pending)       Medical Decision Making:  The following items were considered in medical decision making:  Discussion of patient care with other providers  Reviewed clinical lab tests  Reviewed radiology tests  Reviewed other diagnostic tests/interventions    Yevgeniy Bailey MD   [facility]  Office : [phone] or through [employer]  Fax :[phone]

## 2025-06-17 ENCOUNTER — APPOINTMENT (OUTPATIENT)
Dept: GENERAL RADIOLOGY | Age: 70
End: 2025-06-17
Payer: MEDICAID

## 2025-06-17 ENCOUNTER — APPOINTMENT (OUTPATIENT)
Dept: CT IMAGING | Age: 70
End: 2025-06-17
Payer: MEDICAID

## 2025-06-17 ENCOUNTER — HOSPITAL ENCOUNTER (OUTPATIENT)
Dept: INTERVENTIONAL RADIOLOGY/VASCULAR | Age: 70
Discharge: HOME | End: 2025-06-17
Attending: STUDENT IN AN ORGANIZED HEALTH CARE EDUCATION/TRAINING PROGRAM

## 2025-06-17 PROBLEM — M25.512 SHOULDER PAIN, LEFT: Status: ACTIVE | Noted: 2025-06-17

## 2025-06-17 LAB
ALBUMIN SERPL-MCNC: 3.6 G/DL (ref 3.4–5)
ANION GAP SERPL CALCULATED.3IONS-SCNC: 14 MMOL/L (ref 3–16)
BASOPHILS # BLD: 0.1 K/UL (ref 0–0.2)
BASOPHILS NFR BLD: 1 %
BUN SERPL-MCNC: 48 MG/DL (ref 7–20)
CALCIUM SERPL-MCNC: 8.5 MG/DL (ref 8.3–10.6)
CHLORIDE SERPL-SCNC: 98 MMOL/L (ref 99–110)
CO2 SERPL-SCNC: 23 MMOL/L (ref 21–32)
CREAT SERPL-MCNC: 9.3 MG/DL (ref 0.6–1.2)
DEPRECATED RDW RBC AUTO: 19.8 % (ref 12.4–15.4)
EOSINOPHIL # BLD: 1 K/UL (ref 0–0.6)
EOSINOPHIL NFR BLD: 10.1 %
GFR SERPLBLD CREATININE-BSD FMLA CKD-EPI: 4 ML/MIN/{1.73_M2}
GLUCOSE SERPL-MCNC: 92 MG/DL (ref 70–99)
HCT VFR BLD AUTO: 33.7 % (ref 36–48)
HGB BLD-MCNC: 11.4 G/DL (ref 12–16)
INR PPP: 1.07 (ref 0.86–1.14)
LYMPHOCYTES # BLD: 1 K/UL (ref 1–5.1)
LYMPHOCYTES NFR BLD: 10.3 %
MAGNESIUM SERPL-MCNC: 2.53 MG/DL (ref 1.8–2.4)
MCH RBC QN AUTO: 30.8 PG (ref 26–34)
MCHC RBC AUTO-ENTMCNC: 33.8 G/DL (ref 31–36)
MCV RBC AUTO: 91.2 FL (ref 80–100)
MONOCYTES # BLD: 0.9 K/UL (ref 0–1.3)
MONOCYTES NFR BLD: 9.6 %
NEUTROPHILS # BLD: 6.5 K/UL (ref 1.7–7.7)
NEUTROPHILS NFR BLD: 69 %
PHOSPHATE SERPL-MCNC: 5.5 MG/DL (ref 2.5–4.9)
PLATELET # BLD AUTO: 151 K/UL (ref 135–450)
PMV BLD AUTO: 7.3 FL (ref 5–10.5)
POTASSIUM SERPL-SCNC: 5.5 MMOL/L (ref 3.5–5.1)
PROTHROMBIN TIME: 14.2 SEC (ref 12.1–14.9)
RBC # BLD AUTO: 3.7 M/UL (ref 4–5.2)
SODIUM SERPL-SCNC: 135 MMOL/L (ref 136–145)
WBC # BLD AUTO: 9.5 K/UL (ref 4–11)

## 2025-06-17 PROCEDURE — 6370000000 HC RX 637 (ALT 250 FOR IP): Performed by: HOSPITALIST

## 2025-06-17 PROCEDURE — 6360000002 HC RX W HCPCS

## 2025-06-17 PROCEDURE — 85610 PROTHROMBIN TIME: CPT

## 2025-06-17 PROCEDURE — 36415 COLL VENOUS BLD VENIPUNCTURE: CPT

## 2025-06-17 PROCEDURE — 85025 COMPLETE CBC W/AUTO DIFF WBC: CPT

## 2025-06-17 PROCEDURE — 99233 SBSQ HOSP IP/OBS HIGH 50: CPT | Performed by: INTERNAL MEDICINE

## 2025-06-17 PROCEDURE — 71275 CT ANGIOGRAPHY CHEST: CPT

## 2025-06-17 PROCEDURE — 2500000003 HC RX 250 WO HCPCS: Performed by: HOSPITALIST

## 2025-06-17 PROCEDURE — 6360000004 HC RX CONTRAST MEDICATION: Performed by: STUDENT IN AN ORGANIZED HEALTH CARE EDUCATION/TRAINING PROGRAM

## 2025-06-17 PROCEDURE — 73030 X-RAY EXAM OF SHOULDER: CPT

## 2025-06-17 PROCEDURE — 80069 RENAL FUNCTION PANEL: CPT

## 2025-06-17 PROCEDURE — 83735 ASSAY OF MAGNESIUM: CPT

## 2025-06-17 PROCEDURE — 1200000000 HC SEMI PRIVATE

## 2025-06-17 PROCEDURE — 6360000002 HC RX W HCPCS: Performed by: INTERNAL MEDICINE

## 2025-06-17 RX ORDER — HYDROMORPHONE HYDROCHLORIDE 1 MG/ML
0.5 INJECTION, SOLUTION INTRAMUSCULAR; INTRAVENOUS; SUBCUTANEOUS
Status: COMPLETED | OUTPATIENT
Start: 2025-06-17 | End: 2025-06-17

## 2025-06-17 RX ORDER — IOPAMIDOL 755 MG/ML
75 INJECTION, SOLUTION INTRAVASCULAR
Status: COMPLETED | OUTPATIENT
Start: 2025-06-17 | End: 2025-06-17

## 2025-06-17 RX ORDER — HYDROMORPHONE HYDROCHLORIDE 1 MG/ML
0.5 INJECTION, SOLUTION INTRAMUSCULAR; INTRAVENOUS; SUBCUTANEOUS EVERY 12 HOURS PRN
Status: COMPLETED | OUTPATIENT
Start: 2025-06-17 | End: 2025-06-18

## 2025-06-17 RX ADMIN — SEVELAMER CARBONATE 1600 MG: 800 TABLET, FILM COATED ORAL at 17:59

## 2025-06-17 RX ADMIN — AMIODARONE HYDROCHLORIDE 200 MG: 200 TABLET ORAL at 20:46

## 2025-06-17 RX ADMIN — SODIUM CHLORIDE, PRESERVATIVE FREE 10 ML: 5 INJECTION INTRAVENOUS at 21:47

## 2025-06-17 RX ADMIN — HYDROMORPHONE HYDROCHLORIDE 0.5 MG: 1 INJECTION, SOLUTION INTRAMUSCULAR; INTRAVENOUS; SUBCUTANEOUS at 01:19

## 2025-06-17 RX ADMIN — TORSEMIDE 40 MG: 20 TABLET ORAL at 08:51

## 2025-06-17 RX ADMIN — IOPAMIDOL 75 ML: 755 INJECTION, SOLUTION INTRAVENOUS at 12:45

## 2025-06-17 RX ADMIN — METOPROLOL SUCCINATE 50 MG: 50 TABLET, EXTENDED RELEASE ORAL at 20:46

## 2025-06-17 RX ADMIN — ACETAMINOPHEN 650 MG: 325 TABLET ORAL at 08:56

## 2025-06-17 RX ADMIN — PANTOPRAZOLE SODIUM 40 MG: 40 TABLET, DELAYED RELEASE ORAL at 05:45

## 2025-06-17 RX ADMIN — HYDROMORPHONE HYDROCHLORIDE 0.5 MG: 1 INJECTION, SOLUTION INTRAMUSCULAR; INTRAVENOUS; SUBCUTANEOUS at 11:12

## 2025-06-17 RX ADMIN — CETIRIZINE HYDROCHLORIDE 5 MG: 10 TABLET, FILM COATED ORAL at 08:51

## 2025-06-17 RX ADMIN — OXYCODONE 5 MG: 5 TABLET ORAL at 21:46

## 2025-06-17 RX ADMIN — ASPIRIN 81 MG: 81 TABLET, COATED ORAL at 08:51

## 2025-06-17 RX ADMIN — AMIODARONE HYDROCHLORIDE 200 MG: 200 TABLET ORAL at 08:51

## 2025-06-17 RX ADMIN — ATORVASTATIN CALCIUM 40 MG: 40 TABLET, FILM COATED ORAL at 20:47

## 2025-06-17 RX ADMIN — PANTOPRAZOLE SODIUM 40 MG: 40 TABLET, DELAYED RELEASE ORAL at 15:08

## 2025-06-17 RX ADMIN — OXYCODONE 5 MG: 5 TABLET ORAL at 15:08

## 2025-06-17 RX ADMIN — ACETAMINOPHEN 650 MG: 325 TABLET ORAL at 20:46

## 2025-06-17 RX ADMIN — SODIUM CHLORIDE, PRESERVATIVE FREE 10 ML: 5 INJECTION INTRAVENOUS at 08:51

## 2025-06-17 RX ADMIN — NIFEDIPINE 60 MG: 30 TABLET, EXTENDED RELEASE ORAL at 08:51

## 2025-06-17 RX ADMIN — OXYCODONE 5 MG: 5 TABLET ORAL at 06:50

## 2025-06-17 ASSESSMENT — PAIN DESCRIPTION - ORIENTATION
ORIENTATION: MID;LEFT
ORIENTATION: LEFT;LOWER
ORIENTATION: LEFT;LOWER
ORIENTATION: LEFT
ORIENTATION: RIGHT;LEFT;LOWER

## 2025-06-17 ASSESSMENT — PAIN DESCRIPTION - DESCRIPTORS
DESCRIPTORS: ACHING;DISCOMFORT;SHARP
DESCRIPTORS: ACHING;DISCOMFORT;STABBING
DESCRIPTORS: ACHING;DISCOMFORT;STABBING
DESCRIPTORS: ACHING
DESCRIPTORS: ACHING;DISCOMFORT;PENETRATING
DESCRIPTORS: SHARP;ACHING
DESCRIPTORS: ACHING;DISCOMFORT;STABBING

## 2025-06-17 ASSESSMENT — PAIN SCALES - GENERAL
PAINLEVEL_OUTOF10: 10
PAINLEVEL_OUTOF10: 8
PAINLEVEL_OUTOF10: 10
PAINLEVEL_OUTOF10: 8
PAINLEVEL_OUTOF10: 10
PAINLEVEL_OUTOF10: 10

## 2025-06-17 ASSESSMENT — PAIN DESCRIPTION - LOCATION
LOCATION: ARM;BACK;FLANK
LOCATION: BACK;FLANK
LOCATION: ABDOMEN;BACK;ARM
LOCATION: ARM;BACK;SHOULDER
LOCATION: ABDOMEN;FLANK;BACK

## 2025-06-17 ASSESSMENT — PAIN SCALES - WONG BAKER
WONGBAKER_NUMERICALRESPONSE: NO HURT

## 2025-06-17 ASSESSMENT — PAIN - FUNCTIONAL ASSESSMENT

## 2025-06-17 ASSESSMENT — PAIN DESCRIPTION - ONSET: ONSET: ON-GOING

## 2025-06-17 ASSESSMENT — PAIN DESCRIPTION - PAIN TYPE: TYPE: ACUTE PAIN

## 2025-06-17 ASSESSMENT — PAIN DESCRIPTION - FREQUENCY: FREQUENCY: CONTINUOUS

## 2025-06-17 NOTE — PROGRESS NOTES
Urology Attending Progress Note      Subjective: Reporting arm and leg pain    Vitals:  BP (!) 155/81   Pulse 70   Temp 99 °F (37.2 °C) (Oral)   Resp 18   Ht 1.702 m (5' 7\")   Wt 112.8 kg (248 lb 10.9 oz)   SpO2 99%   BMI 38.95 kg/m²   Temp  Av °F (36.7 °C)  Min: 97.3 °F (36.3 °C)  Max: 99 °F (37.2 °C)    Intake/Output Summary (Last 24 hours) at 2025 0946  Last data filed at 2025 0848  Gross per 24 hour   Intake 670 ml   Output 300 ml   Net 370 ml       Exam: Laying in bed, NAD. Urine clear yellow in maciel    Labs:  WBC:    Lab Results   Component Value Date/Time    WBC 9.5 2025 05:30 AM     Hemoglobin/Hematocrit:    Lab Results   Component Value Date/Time    HGB 11.4 2025 05:30 AM    HCT 33.7 2025 05:30 AM     BMP:    Lab Results   Component Value Date/Time     2025 05:30 AM    K 5.5 2025 05:30 AM    K 5.2 2025 05:05 PM    CL 98 2025 05:30 AM    CO2 23 2025 05:30 AM    BUN 48 2025 05:30 AM    CREATININE 9.3 2025 05:30 AM    CALCIUM 8.5 2025 05:30 AM    GFRAA 8 2022 02:57 PM    LABGLOM 4 2025 05:30 AM    LABGLOM 6 2023 11:26 AM     PT/INR:    Lab Results   Component Value Date/Time    PROTIME 14.2 2025 05:30 AM    INR 1.07 2025 05:30 AM     PTT:    Lab Results   Component Value Date/Time    APTT 32.1 2024 02:36 AM   [APT    Impression/Plan: 70yo F with 12.8cm L renal cyst and ESBL Ecoli UTI. We were consulted for recs regarding her renal cyst. Now sp aspiration with IR 25    -Having arm and leg pain today. No voiding complaints  -No complications noted with aspiration yesterday  -Rest of care per primary team. We will see PRN at this time, please call with any questions/concerns     MARK Nichole

## 2025-06-17 NOTE — PROGRESS NOTES
Patient is alert and oriented x4. VSS on room air, exceptions to elevated BP. Medications given per MAR, no side effects noted. Patient ambulating x2 with gait belt and stedy. Endorses pain to abdomen and back, continuing to monitor and manage per MAR. Voiding well via external urinary cathter,  x0 bm this shift. Q2 turns encouraged, pt often refuses. Pt educated on prevention of skin breakdown utilizing turns.      Patient is currently resting in bed with bed alarm on for safety. Call light within reach and all fall precautions in place. Plan of care continues    Pt transferred to PCU. Report given via telephone to receiving nurse. PT NPO at midnight tonight.

## 2025-06-17 NOTE — PROGRESS NOTES
Factors:   Emergent Procedure Type:    ----------------------------------------------------------------------  C. Data (any 2)  [x] Data Review (3+ points)  [x] Consultant Note reviewed with note date, specialty, and summary (1 point each) urology note reviewed from 6/16 for cyst aspiration today.  Vascular surgery note reviewed from 6/16 for possible ablation  [x] All current labs were reviewed and interpreted for clinical significance   [] Studies Reviewed (1 point each):   [] Collateral history obtained including from who and why needed (Max 1 point):  [x] Independent (Cheryl Kaur MD) Interpretation of tests (any 1)  [x] Rhythm Strip (Telemetry) personally reviewed and interpreted as documented above  : Sinus rhythm  [] Imaging personally reviewed and interpreted, includes:    [x] Discussion (any 1)  [x] Discussed the discharge plan in detail with case management including timing/barriers to discharge, need for support services and/or placement decision   [] Discussed management of the case with:     Subjective:     Chief Complaint: Abdominal pain    Florida Saleem is a 69 y.o. female who presents with abdominal pain    Patient is a poor historian.  Patient complaining of a lot of pain in her left shoulder.  States she is not able to move her arm after her procedure yesterday      Review of Systems:      Pertinent positives and negatives discussed in HPI    Objective:     Intake/Output Summary (Last 24 hours) at 6/17/2025 0734  Last data filed at 6/16/2025 1812  Gross per 24 hour   Intake 640 ml   Output 300 ml   Net 340 ml      Vitals:   Vitals:    06/17/25 0000 06/17/25 0119 06/17/25 0530 06/17/25 0650   BP:   (!) 148/88    Pulse: 65  65    Resp:  18 16 16   Temp:   97.5 °F (36.4 °C)    TempSrc:   Oral    SpO2:   97%    Weight:       Height:             Physical Exam:      General: NAD  Eyes: EOMI  ENT: neck supple  Cardiovascular: Regular rate.  Respiratory: Clear to auscultation  Gastrointestinal:  No pneumothorax. BONES / SOFT TISSUES: No acute osseous abnormality. OTHER: None.     1.  Negative portable chest radiograph. Electronically signed by Mao Boothe      CBC:   Recent Labs     06/15/25  0748 06/17/25  0530   WBC 9.5 9.5   HGB 11.6* 11.4*    151     BMP:    Recent Labs     06/14/25  1705 06/15/25  0748 06/17/25  0530   * 128* 135*   K 5.2* 5.5* 5.5*   CL 94* 94* 98*   CO2 20* 20* 23   BUN 47* 54* 48*   CREATININE 9.8* 10.5* 9.3*   GLUCOSE 104* 89 92     Hepatic: No results for input(s): \"AST\", \"ALT\", \"BILITOT\", \"ALKPHOS\" in the last 72 hours.    Invalid input(s): \"ALB\"  Lipids:   Lab Results   Component Value Date/Time    CHOL 170 06/10/2019 12:12 PM    HDL 40 06/10/2019 12:12 PM    TRIG 132 06/10/2019 12:12 PM     Hemoglobin A1C:   Lab Results   Component Value Date/Time    LABA1C 4.8 11/04/2024 02:36 AM     TSH:   Lab Results   Component Value Date/Time    TSH 0.78 07/25/2020 10:59 AM     Troponin: No results found for: \"TROPONINT\"  Lactic Acid: No results for input(s): \"LACTA\" in the last 72 hours.  BNP: No results for input(s): \"PROBNP\" in the last 72 hours.  UA:  Lab Results   Component Value Date/Time    NITRU Negative 06/10/2025 02:08 PM    COLORU Yellow 06/10/2025 02:08 PM    PHUR 6.0 06/10/2025 02:08 PM    PHUR 8.0 09/08/2023 08:15 AM    WBCUA  06/10/2025 02:08 PM    RBCUA 3-4 06/10/2025 02:08 PM    MUCUS 2+ 05/29/2021 06:00 AM    BACTERIA 4+ 06/10/2025 02:08 PM    CLARITYU SL CLOUDY 06/10/2025 02:08 PM    LEUKOCYTESUR LARGE 06/10/2025 02:08 PM    UROBILINOGEN 0.2 06/10/2025 02:08 PM    BILIRUBINUR Negative 06/10/2025 02:08 PM    BLOODU SMALL 06/10/2025 02:08 PM    GLUCOSEU Negative 06/10/2025 02:08 PM    KETUA Negative 06/10/2025 02:08 PM    AMORPHOUS 1+ 07/24/2020 11:24 PM     Urine Cultures:   Lab Results   Component Value Date/Time    LABURIN  06/10/2025 02:07 PM     <50,000 CFU/ml mixed skin/urogenital betsy. No further workup    LABURIN  06/10/2025 02:07 PM

## 2025-06-17 NOTE — PROGRESS NOTES
BID PRN      Physical exam:     Vitals:  BP (!) 155/81   Pulse 70   Temp 99 °F (37.2 °C) (Oral)   Resp 18   Ht 1.702 m (5' 7\")   Wt 112.8 kg (248 lb 10.9 oz)   SpO2 99%   BMI 38.95 kg/m²   Constitutional:  OAA X3 NAD Yes  Skin: no rash, turgor wnl  Heent:  eomi, mmm  Neck: no bruits or jvd noted  Cardiovascular:  S1, S2 without m/r/g  Respiratory: CTA B without w/r/r. On RA  Abdomen:  soft, nd  Ext:  lower extremity edema No  Psychiatric: mood and affect flat  Musculoskeletal:  Rom, muscular strength intact    Data:   Labs:  CBC:   Recent Labs     06/15/25  0748 06/17/25  0530   WBC 9.5 9.5   HGB 11.6* 11.4*    151     BMP:    Recent Labs     06/14/25  1705 06/15/25  0748 06/17/25  0530   * 128* 135*   K 5.2* 5.5* 5.5*   CL 94* 94* 98*   CO2 20* 20* 23   BUN 47* 54* 48*   CREATININE 9.8* 10.5* 9.3*   GLUCOSE 104* 89 92     Ca/Mg/Phos:   Recent Labs     06/14/25  1705 06/15/25  0748 06/17/25  0530   CALCIUM 8.5 8.1* 8.5   MG  --   --  2.53*   PHOS  --  5.9* 5.5*     Hepatic:   No results for input(s): \"AST\", \"ALT\", \"BILITOT\", \"ALKPHOS\" in the last 72 hours.    Invalid input(s): \"ALB\"    Troponin: No results for input(s): \"TROPONINI\" in the last 72 hours.  BNP: No results for input(s): \"BNP\" in the last 72 hours.  Lipids: No results for input(s): \"CHOL\", \"TRIG\", \"HDL\" in the last 72 hours.    Invalid input(s): \"LDLCALC\", \"LABVLDL\"  ABGs: No results for input(s): \"PHART\", \"PO2ART\", \"HVE6OUF\" in the last 72 hours.  INR:   Recent Labs     06/17/25  0530   INR 1.07       UA:  No results for input(s): \"COLORU\", \"CLARITYU\", \"GLUCOSEU\", \"BILIRUBINUR\", \"KETUA\", \"SPECGRAV\", \"BLOODU\", \"PHUR\", \"PROTEINU\", \"UROBILINOGEN\", \"NITRU\", \"LEUKOCYTESUR\", \"URINETYPE\" in the last 72 hours.    Invalid input(s): \"LABMICR\"     Urine Microscopic:   No results for input(s): \"LABCAST\", \"BACTERIA\", \"COMU\", \"HYALCAST\", \"WBCUA\", \"RBCUA\" in the last 72 hours.    Invalid input(s): \"EPIU\"    Urine Culture:   No results for  input(s): \"LABURIN\" in the last 72 hours.    Urine Chemistry: No results for input(s): \"CLUR\", \"LABCREA\", \"PROTEINUR\", \"NAUR\" in the last 72 hours.      IMAGING:  CT GUIDED RENAL CYST ASPIRATION   Final Result   Successful CT-guided aspiration of a left renal cyst. A fluid sample   was sent for cytology.         Electronically signed by Epi Castro      POC US ECHOCARDIO TRANSTHORACIC LTD   Final Result      CTA CHEST ABDOMEN PELVIS W CONTRAST   Final Result   Stable vascular findings including aortobiiliac stent graft and   dilation of the excluded aneurysmal sac to 6.1 cm. Findings, as described above,   are suggestive of an endoleak.      No acute vascular abnormality of the chest.      Stable complex left renal lesions, indeterminate. Follow-up MRI of the abdomen   without and with contrast is recommended.      Electronically signed by Epi Castro      XR CHEST PORTABLE   Final Result      1.  Negative portable chest radiograph.         Electronically signed by Mao Boothe      IR EMBOLIZATION HEMORRHAGE    (Results Pending)       Medical Decision Making:  The following items were considered in medical decision making:  Discussion of patient care with other providers  Reviewed clinical lab tests  Reviewed radiology tests  Reviewed other diagnostic tests/interventions    Jana Johnson PA-C   [facility]  Office : [phone] or through [employer]  Fax :[phone]      I have seen the patient independently from the PA/NP .I discussed the care with the PA/NP . I personally reviewed the HPI, PH, FH, SH, ROS and medications. I repeated pertinent portions of the examination and reviewed the relevant imaging and laboratory data. I agree with the findings, assessment and plan as documented, with the following addendum:        Yevgeniy Bailey MD

## 2025-06-17 NOTE — PROGRESS NOTES
4 Eyes Skin Assessment     NAME:  Florida Saleem  YOB: 1955  MEDICAL RECORD NUMBER:  [id number]    The patient is being assessed for  Admission    I agree that at least one RN has performed a thorough Head to Toe Skin Assessment on the patient. ALL assessment sites listed below have been assessed.      Areas assessed by both nurses:    Head, Face, Ears, Shoulders, Back, Chest, Arms, Elbows, Hands, Sacrum. Buttock, Coccyx, Ischium, and Legs. Feet and Heels        Does the Patient have a Wound? No noted wound(s)       Reginald Prevention initiated by RN: Yes  Wound Care Orders initiated by RN: No     Pressure Injury (Stage 3,4, Unstageable, DTI, NWPT, and Complex wounds) if present, place Wound referral order by RN under : No    New Ostomies, if present place, Ostomy referral order under : No     Nurse 1 eSignature: Electronically signed by Rajendra Mae RN on 6/17/25 at 7:15 AM EDT    **SHARE this note so that the co-signing nurse can place an eSignature**    Nurse 2 eSignature: Electronically signed by Lola Davison RN on 6/18/25 at 7:00 AM EDT

## 2025-06-17 NOTE — PLAN OF CARE
Problem: Discharge Planning  Goal: Discharge to home or other facility with appropriate resources  Outcome: Progressing   Pt involved in discharge planning. Barriers to discharge discussed with patient. Discharge learning needs identified. Discuss with patient any additional needed resources and transportation plans. Case management following plan of care.      Problem: Pain  Goal: Verbalizes/displays adequate comfort level or baseline comfort level  Outcome: Progressing  Flowsheets (Taken 6/16/2025 2000)  Verbalizes/displays adequate comfort level or baseline comfort level: Encourage patient to monitor pain and request assistance     Problem: Skin/Tissue Integrity  Goal: Skin integrity remains intact  Description: 1.  Monitor for areas of redness and/or skin breakdown2.  Assess vascular access sites hourly3.  Every 4-6 hours minimum:  Change oxygen saturation probe site4.  Every 4-6 hours:  If on nasal continuous positive airway pressure, respiratory therapy assess nares and determine need for appliance change or resting period  Outcome: Progressing       Problem: Safety - Adult  Goal: Free from fall injury  Outcome: Progressing  All fall precautions in place. Bed locked and in lowest position with alarm on. Overbed table and personal belonings within reach. Call light within reach and patient instructed to use call light for assistance. Non-skid socks on.

## 2025-06-17 NOTE — CARE COORDINATION
Patient is from [facility] as a long term care resident. She receives HD on TTS at [facility]. She is wheelchair bound.      Current discharge plan is to return to [facility] as a LTC resident (no precert needed) with resumption of HD at [facility].      Pt does not \"feel upto\" the endoleak embolization procedure today , CTA is recommended for possible dissection - CM team will continue to follow.

## 2025-06-17 NOTE — PROGRESS NOTES
Vascular Surgery Daily Progress Note  Patient: Florida Saleem    CC: Type II endoleak    Subjective :  No acute events overnight.  Only minimal abdominal pain, voiding    ROS: A 14 point review of systems was conducted, significant findings as noted above. All other systems negative.    Objective :   Infusions:   dextrose      sodium chloride          I/O:I/O last 3 completed shifts:  In: 1120 [P.O.:1120]  Out: 900 [Urine:900]           Wt Readings from Last 1 Encounters:   06/16/25 112.8 kg (248 lb 10.9 oz)       Exam:BP (!) 148/88   Pulse 65   Temp 97.5 °F (36.4 °C) (Oral)   Resp 16   Ht 1.702 m (5' 7\")   Wt 112.8 kg (248 lb 10.9 oz)   SpO2 97%   BMI 38.95 kg/m²     General appearance: alert, in no apparent distress   Lungs: no accessory muscle use, normal rate and rhythm, on room air  Heart: regular rate and rhythm  Abdomen: soft, tender over left upper quadrant , non-distended; no guarding, no rigidity  Extremities: no open wounds or acute signs of infection    Pulses    PT DP   R +/+ +/+   L +/+ +/+    +, -/+ ,-/-       LABS:   Recent Labs     06/15/25  0748 06/17/25  0530   WBC 9.5 9.5   HGB 11.6* 11.4*   HCT 34.7* 33.7*   MCV 90.8 91.2    151        Recent Labs     06/15/25  0748 06/17/25  0530   * 135*   K 5.5* 5.5*   CL 94* 98*   CO2 20* 23   PHOS 5.9* 5.5*   BUN 54* 48*   CREATININE 10.5* 9.3*        No results for input(s): \"AST\", \"ALT\", \"BILIDIR\", \"BILITOT\", \"ALKPHOS\" in the last 72 hours.    Invalid input(s): \"ALB\"     No results for input(s): \"LIPASE\", \"AMYLASE\" in the last 72 hours.     Recent Labs     06/17/25  0530   INR 1.07       ASSESSMENT/PLAN: Florida Saleem is a 69 y.o. female  with Hx of HTN, HLD, DM, COPD, ESRD on hemodialysis (Tu/Th/Sat), parathyroidectomy (8/2024), appendectomy, cholecystectomy, chronic abdominal pain, and AAA s/p endovascular repair (9/2023).  Vascular consulted regarding newly-noted endoleak in setting of aneurysm sac growth (6.1cm from 5.8cm

## 2025-06-17 NOTE — PLAN OF CARE
Problem: Chronic Conditions and Co-morbidities  Goal: Patient's chronic conditions and co-morbidity symptoms are monitored and maintained or improved  Outcome: Progressing  Flowsheets (Taken 6/17/2025 0000)  Care Plan - Patient's Chronic Conditions and Co-Morbidity Symptoms are Monitored and Maintained or Improved: Monitor and assess patient's chronic conditions and comorbid symptoms for stability, deterioration, or improvement     Problem: Discharge Planning  Goal: Discharge to home or other facility with appropriate resources  6/17/2025 0423 by Rajendra Mae RN  Outcome: Progressing  Flowsheets (Taken 6/17/2025 0000)  Discharge to home or other facility with appropriate resources: Identify barriers to discharge with patient and caregiver  6/16/2025 2320 by Ruthie Angelo RN  Outcome: Progressing     Problem: Pain  Goal: Verbalizes/displays adequate comfort level or baseline comfort level  6/17/2025 0423 by Rajendra Mae RN  Outcome: Progressing  6/16/2025 2320 by Ruthie Angelo RN  Outcome: Progressing  Flowsheets  Taken 6/16/2025 2315 by Rajendra Mae RN  Verbalizes/displays adequate comfort level or baseline comfort level: Encourage patient to monitor pain and request assistance  Taken 6/16/2025 2000 by Ruthie Angelo RN  Verbalizes/displays adequate comfort level or baseline comfort level: Encourage patient to monitor pain and request assistance

## 2025-06-17 NOTE — PROGRESS NOTES
IR Progress Note    Discussed endoleak embolization procedure with patient today. She reports new thoracic back pain, left shoulder pain, and left sided weakness, as well as hoarseness. She also endorses dysuria. Overall, she understands the importance of endoleak embolization but does not feel up to the procedure today. We discussed potential outcomes from not having her endoleak addressed and she verbalized understanding.     Given her vascular history, recommend CTA of the chest for possible dissection. We will defer endoleak embolization for now.    Jono Islas MD  06/17/25  10:41 AM

## 2025-06-17 NOTE — PLAN OF CARE
Problem: Skin/Tissue Integrity  Goal: Skin integrity remains intact  Description: 1.  Monitor for areas of redness and/or skin breakdown2.  Assess vascular access sites hourly3.  Every 4-6 hours minimum:  Change oxygen saturation probe site4.  Every 4-6 hours:  If on nasal continuous positive airway pressure, respiratory therapy assess nares and determine need for appliance change or resting period  Recent Flowsheet Documentation  Taken 6/17/2025 0331 by Rajendra Mae RN  Skin Integrity Remains Intact: Monitor for areas of redness and/or skin breakdown  Taken 6/17/2025 0000 by Rajendra Mae RN  Skin Integrity Remains Intact: Monitor for areas of redness and/or skin breakdown  6/16/2025 2320 by Ruthie Angelo RN  Outcome: Progressing     Problem: Safety - Adult  Goal: Free from fall injury  Recent Flowsheet Documentation  Taken 6/17/2025 0331 by Rajendra Mae RN  Free From Fall Injury: Instruct family/caregiver on patient safety  6/16/2025 2320 by Ruthie Angelo RN  Outcome: Progressing     Problem: ABCDS Injury Assessment  Goal: Absence of physical injury  Recent Flowsheet Documentation  Taken 6/17/2025 0331 by Rajendra Mae RN  Absence of Physical Injury: Implement safety measures based on patient assessment

## 2025-06-17 NOTE — PLAN OF CARE
Problem: Pain  Goal: Verbalizes/displays adequate comfort level or baseline comfort level  6/17/2025 1627 by Jasmyne Kinney RN  Outcome: Not Progressing  Flowsheets (Taken 6/17/2025 1627)  Verbalizes/displays adequate comfort level or baseline comfort level:   Encourage patient to monitor pain and request assistance   Assess pain using appropriate pain scale   Administer analgesics based on type and severity of pain and evaluate response   Implement non-pharmacological measures as appropriate and evaluate response   Notify Licensed Independent Practitioner if interventions unsuccessful or patient reports new pain  Note: PRN pain management as ordered. See MAR and flowsheets     Problem: Discharge Planning  Goal: Discharge to home or other facility with appropriate resources  6/17/2025 1627 by Jasmyne Kinney RN  Outcome: Progressing  Flowsheets (Taken 6/17/2025 1627)  Discharge to home or other facility with appropriate resources:   Identify barriers to discharge with patient and caregiver   Identify discharge learning needs (meds, wound care, etc)     Problem: Skin/Tissue Integrity  Goal: Skin integrity remains intact  Description: 1.  Monitor for areas of redness and/or skin breakdown2.  Assess vascular access sites hourly3.  Every 4-6 hours minimum:  Change oxygen saturation probe site4.  Every 4-6 hours:  If on nasal continuous positive airway pressure, respiratory therapy assess nares and determine need for appliance change or resting period  Outcome: Progressing  Flowsheets  Taken 6/17/2025 1627 by Jasmyne Kinney RN  Skin Integrity Remains Intact: Monitor for areas of redness and/or skin breakdown  Note: Patient refused Q2 turns, heel boots     Problem: Safety - Adult  Goal: Free from fall injury  Outcome: Progressing  Flowsheets  Taken 6/17/2025 1627 by Jasmyne Kinney, RN  Free From Fall Injury: Instruct family/caregiver on patient safety  Note: Patient in lowest position, bed breaks locked, call light

## 2025-06-17 NOTE — CONSULTS
Attending   Consult Note        Reason for Consult:  left shoulder pain   Requesting Physician: Cheryl Kaur MD  Date of Service: 6/17/2025 11:47 AM    CHIEF COMPLAINT:  As Above    History Obtained From:  patient    HISTORY OF PRESENT ILLNESS:                The patient is a 69 y.o. female who presents with above chief complaint.  Her left shoulder seemed baseline until overnight after positioning during procedure and then for sleep. This morning noted difficulty elevating at shoulder.     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     Rash     Uterine prolapse     Venous stasis      Past Surgical History:        Procedure Laterality Date    ABDOMINAL AORTIC ANEURYSM REPAIR, ENDOVASCULAR N/A 09/08/2023    ABDOMINAL AORTIC ANEURYSM REPAIR ENDOVASCULAR performed by Sofiya Alonzo MD at [facility] OR    APPENDECTOMY      CHOLECYSTECTOMY      COLONOSCOPY N/A 11/5/2024    COLONOSCOPY BIOPSY/ STOOL ASPIRATE performed by Laura Roberson MD at [facility] ENDOSCOPY    CT GUIDED RENAL CYST ASPIRATION  6/16/2025    CT GUIDED RENAL CYST ASPIRATION 6/16/2025 [facility] CT SCAN    IR TUNNELED CVC PLACE WO SQ PORT/PUMP > 5 YEARS  06/01/2021    IR TUNNELED CATHETER PLACEMENT GREATER THAN 5 YEARS 6/1/2021 [facility] SPECIAL PROCEDURES    KIDNEY CYST REMOVAL           Medications Prior to Admission:   Prior to Admission medications    Medication Sig Start Date End Date Taking? Authorizing Provider   amitriptyline (ELAVIL) 25 MG tablet Take 1 tablet by mouth nightly 5/24/25  Yes Provider, MD Lis   albuterol (PROVENTIL) (2.5 MG/3ML) 0.083% nebulizer solution Take 3 mLs by

## 2025-06-18 LAB
ALBUMIN SERPL-MCNC: 3.3 G/DL (ref 3.4–5)
ANION GAP SERPL CALCULATED.3IONS-SCNC: 15 MMOL/L (ref 3–16)
BASOPHILS # BLD: 0.1 K/UL (ref 0–0.2)
BASOPHILS NFR BLD: 0.8 %
BUN SERPL-MCNC: 59 MG/DL (ref 7–20)
CALCIUM SERPL-MCNC: 7.9 MG/DL (ref 8.3–10.6)
CHLORIDE SERPL-SCNC: 95 MMOL/L (ref 99–110)
CO2 SERPL-SCNC: 20 MMOL/L (ref 21–32)
CREAT SERPL-MCNC: 10.8 MG/DL (ref 0.6–1.2)
DEPRECATED RDW RBC AUTO: 20.4 % (ref 12.4–15.4)
EOSINOPHIL # BLD: 1.2 K/UL (ref 0–0.6)
EOSINOPHIL NFR BLD: 11.7 %
GFR SERPLBLD CREATININE-BSD FMLA CKD-EPI: 4 ML/MIN/{1.73_M2}
GLUCOSE SERPL-MCNC: 85 MG/DL (ref 70–99)
HCT VFR BLD AUTO: 31.8 % (ref 36–48)
HGB BLD-MCNC: 10.7 G/DL (ref 12–16)
LYMPHOCYTES # BLD: 1.4 K/UL (ref 1–5.1)
LYMPHOCYTES NFR BLD: 14.4 %
MAGNESIUM SERPL-MCNC: 2.52 MG/DL (ref 1.8–2.4)
MCH RBC QN AUTO: 30.6 PG (ref 26–34)
MCHC RBC AUTO-ENTMCNC: 33.6 G/DL (ref 31–36)
MCV RBC AUTO: 91.1 FL (ref 80–100)
MONOCYTES # BLD: 0.9 K/UL (ref 0–1.3)
MONOCYTES NFR BLD: 9 %
NEUTROPHILS # BLD: 6.4 K/UL (ref 1.7–7.7)
NEUTROPHILS NFR BLD: 64.1 %
PHOSPHATE SERPL-MCNC: 5.8 MG/DL (ref 2.5–4.9)
PLATELET # BLD AUTO: 173 K/UL (ref 135–450)
PMV BLD AUTO: 7.5 FL (ref 5–10.5)
POTASSIUM SERPL-SCNC: 5.9 MMOL/L (ref 3.5–5.1)
RBC # BLD AUTO: 3.49 M/UL (ref 4–5.2)
SODIUM SERPL-SCNC: 130 MMOL/L (ref 136–145)
WBC # BLD AUTO: 9.9 K/UL (ref 4–11)

## 2025-06-18 PROCEDURE — 6370000000 HC RX 637 (ALT 250 FOR IP): Performed by: HOSPITALIST

## 2025-06-18 PROCEDURE — 1200000000 HC SEMI PRIVATE

## 2025-06-18 PROCEDURE — 6360000002 HC RX W HCPCS: Performed by: INTERNAL MEDICINE

## 2025-06-18 PROCEDURE — 99233 SBSQ HOSP IP/OBS HIGH 50: CPT | Performed by: INTERNAL MEDICINE

## 2025-06-18 PROCEDURE — 6360000002 HC RX W HCPCS

## 2025-06-18 PROCEDURE — 90935 HEMODIALYSIS ONE EVALUATION: CPT

## 2025-06-18 PROCEDURE — 83735 ASSAY OF MAGNESIUM: CPT

## 2025-06-18 PROCEDURE — 85025 COMPLETE CBC W/AUTO DIFF WBC: CPT

## 2025-06-18 PROCEDURE — 80069 RENAL FUNCTION PANEL: CPT

## 2025-06-18 PROCEDURE — 36415 COLL VENOUS BLD VENIPUNCTURE: CPT

## 2025-06-18 PROCEDURE — 6370000000 HC RX 637 (ALT 250 FOR IP): Performed by: NURSE PRACTITIONER

## 2025-06-18 PROCEDURE — 6370000000 HC RX 637 (ALT 250 FOR IP)

## 2025-06-18 PROCEDURE — 2500000003 HC RX 250 WO HCPCS: Performed by: HOSPITALIST

## 2025-06-18 RX ORDER — HYDROMORPHONE HYDROCHLORIDE 1 MG/ML
0.25 INJECTION, SOLUTION INTRAMUSCULAR; INTRAVENOUS; SUBCUTANEOUS ONCE
Status: COMPLETED | OUTPATIENT
Start: 2025-06-18 | End: 2025-06-18

## 2025-06-18 RX ORDER — LIDOCAINE 4 G/G
1 PATCH TOPICAL DAILY
Status: COMPLETED | OUTPATIENT
Start: 2025-06-18 | End: 2025-06-19

## 2025-06-18 RX ORDER — HEPARIN SODIUM 1000 [USP'U]/ML
3600 INJECTION, SOLUTION INTRAVENOUS; SUBCUTANEOUS PRN
Status: DISCONTINUED | OUTPATIENT
Start: 2025-06-18 | End: 2025-06-20 | Stop reason: HOSPADM

## 2025-06-18 RX ADMIN — HEPARIN SODIUM 3600 UNITS: 1000 INJECTION INTRAVENOUS; SUBCUTANEOUS at 18:44

## 2025-06-18 RX ADMIN — NIFEDIPINE 60 MG: 30 TABLET, EXTENDED RELEASE ORAL at 08:16

## 2025-06-18 RX ADMIN — ATORVASTATIN CALCIUM 40 MG: 40 TABLET, FILM COATED ORAL at 21:09

## 2025-06-18 RX ADMIN — OXYCODONE 5 MG: 5 TABLET ORAL at 12:49

## 2025-06-18 RX ADMIN — HYDROMORPHONE HYDROCHLORIDE 0.25 MG: 1 INJECTION, SOLUTION INTRAMUSCULAR; INTRAVENOUS; SUBCUTANEOUS at 22:12

## 2025-06-18 RX ADMIN — PANTOPRAZOLE SODIUM 40 MG: 40 TABLET, DELAYED RELEASE ORAL at 06:51

## 2025-06-18 RX ADMIN — SODIUM CHLORIDE, PRESERVATIVE FREE 10 ML: 5 INJECTION INTRAVENOUS at 21:11

## 2025-06-18 RX ADMIN — SEVELAMER CARBONATE 1600 MG: 800 TABLET, FILM COATED ORAL at 08:17

## 2025-06-18 RX ADMIN — HYDROMORPHONE HYDROCHLORIDE 0.5 MG: 1 INJECTION, SOLUTION INTRAMUSCULAR; INTRAVENOUS; SUBCUTANEOUS at 01:56

## 2025-06-18 RX ADMIN — SEVELAMER CARBONATE 1600 MG: 800 TABLET, FILM COATED ORAL at 19:40

## 2025-06-18 RX ADMIN — OXYCODONE 5 MG: 5 TABLET ORAL at 06:56

## 2025-06-18 RX ADMIN — AMIODARONE HYDROCHLORIDE 200 MG: 200 TABLET ORAL at 08:17

## 2025-06-18 RX ADMIN — CETIRIZINE HYDROCHLORIDE 5 MG: 10 TABLET, FILM COATED ORAL at 08:17

## 2025-06-18 RX ADMIN — AMIODARONE HYDROCHLORIDE 200 MG: 200 TABLET ORAL at 21:09

## 2025-06-18 RX ADMIN — METOPROLOL SUCCINATE 50 MG: 50 TABLET, EXTENDED RELEASE ORAL at 21:09

## 2025-06-18 RX ADMIN — PANTOPRAZOLE SODIUM 40 MG: 40 TABLET, DELAYED RELEASE ORAL at 19:40

## 2025-06-18 RX ADMIN — AMITRIPTYLINE HYDROCHLORIDE 25 MG: 25 TABLET, FILM COATED ORAL at 21:18

## 2025-06-18 RX ADMIN — SODIUM CHLORIDE, PRESERVATIVE FREE 10 ML: 5 INJECTION INTRAVENOUS at 08:20

## 2025-06-18 RX ADMIN — ASPIRIN 81 MG: 81 TABLET, COATED ORAL at 08:18

## 2025-06-18 RX ADMIN — SEVELAMER CARBONATE 1600 MG: 800 TABLET, FILM COATED ORAL at 12:49

## 2025-06-18 RX ADMIN — OXYCODONE 5 MG: 5 TABLET ORAL at 19:40

## 2025-06-18 RX ADMIN — TORSEMIDE 40 MG: 20 TABLET ORAL at 08:18

## 2025-06-18 ASSESSMENT — PAIN DESCRIPTION - PAIN TYPE
TYPE: ACUTE PAIN

## 2025-06-18 ASSESSMENT — PAIN SCALES - GENERAL
PAINLEVEL_OUTOF10: 8
PAINLEVEL_OUTOF10: 9
PAINLEVEL_OUTOF10: 9
PAINLEVEL_OUTOF10: 8
PAINLEVEL_OUTOF10: 9
PAINLEVEL_OUTOF10: 7
PAINLEVEL_OUTOF10: 9
PAINLEVEL_OUTOF10: 10
PAINLEVEL_OUTOF10: 10
PAINLEVEL_OUTOF10: 7
PAINLEVEL_OUTOF10: 7
PAINLEVEL_OUTOF10: 8
PAINLEVEL_OUTOF10: 7
PAINLEVEL_OUTOF10: 10

## 2025-06-18 ASSESSMENT — PAIN DESCRIPTION - DESCRIPTORS
DESCRIPTORS: ACHING;CRAMPING
DESCRIPTORS: ACHING;DISCOMFORT;STABBING
DESCRIPTORS: CRUSHING;DISCOMFORT
DESCRIPTORS: ACHING;CRAMPING;STABBING
DESCRIPTORS: ACHING;CRAMPING
DESCRIPTORS: ACHING;SORE
DESCRIPTORS: ACHING;CRAMPING
DESCRIPTORS: ACHING;CRAMPING

## 2025-06-18 ASSESSMENT — PAIN DESCRIPTION - FREQUENCY
FREQUENCY: CONTINUOUS

## 2025-06-18 ASSESSMENT — PAIN - FUNCTIONAL ASSESSMENT
PAIN_FUNCTIONAL_ASSESSMENT: PREVENTS OR INTERFERES SOME ACTIVE ACTIVITIES AND ADLS

## 2025-06-18 ASSESSMENT — PAIN DESCRIPTION - LOCATION
LOCATION: BACK;FLANK
LOCATION: ABDOMEN;BACK
LOCATION: BACK;FLANK
LOCATION: BACK;FLANK
LOCATION: BACK;ABDOMEN
LOCATION: BACK;FLANK

## 2025-06-18 ASSESSMENT — PAIN DESCRIPTION - ONSET
ONSET: ON-GOING

## 2025-06-18 ASSESSMENT — PAIN DESCRIPTION - ORIENTATION
ORIENTATION: LEFT
ORIENTATION: MID;LEFT
ORIENTATION: LOWER;MID
ORIENTATION: LEFT;MID;LOWER
ORIENTATION: LEFT

## 2025-06-18 NOTE — PROGRESS NOTES
Treatment time: 3 hours  Net UF: 529ml     Pre weight: 111.2 kg  Post weight:110.6 kg  EDW:  kg    Access used: CVC    Access function: fair  with  ml/min     Medications or blood products given: none     Regular outpatient schedule: tts     Summary of response to treatment: Patient tolerated treatment well,machine clotted twenty minutes prior to full Tx, half of pt's blood returned, pt's cramping resolved  Report given to Cheryl Knutson Copy of dialysis treatment record placed in chart, to be scanned into EMR.

## 2025-06-18 NOTE — PROGRESS NOTES
Vascular Surgery Daily Progress Note  Patient: Florida Saleem    CC: Type II endoleak    Subjective :  No acute events overnight.  Only minimal abdominal pain, voiding    ROS: A 14 point review of systems was conducted, significant findings as noted above. All other systems negative.    Objective :   Infusions:   dextrose      sodium chloride          I/O:I/O last 3 completed shifts:  In: 910 [P.O.:910]  Out: 875 [Urine:875]           Wt Readings from Last 1 Encounters:   06/17/25 111.2 kg (245 lb 2.4 oz)       Exam:/74   Pulse 65   Temp 97.9 °F (36.6 °C) (Oral)   Resp 18   Ht 1.702 m (5' 7\")   Wt 111.2 kg (245 lb 2.4 oz)   SpO2 98%   BMI 38.40 kg/m²     General appearance: alert, in no apparent distress   Lungs: no accessory muscle use, normal rate and rhythm, on room air  Heart: regular rate and rhythm  Abdomen: soft, tender over left upper quadrant , non-distended; no guarding, no rigidity  Extremities: no open wounds or acute signs of infection    Pulses    PT DP   R +/+ +/+   L +/+ +/+    +, -/+ ,-/-       LABS:   Recent Labs     06/17/25  0530 06/18/25  0433   WBC 9.5 9.9   HGB 11.4* 10.7*   HCT 33.7* 31.8*   MCV 91.2 91.1    173        Recent Labs     06/17/25  0530 06/18/25  0433   * 130*   K 5.5* 5.9*   CL 98* 95*   CO2 23 20*   PHOS 5.5* 5.8*   BUN 48* 59*   CREATININE 9.3* 10.8*        No results for input(s): \"AST\", \"ALT\", \"BILIDIR\", \"BILITOT\", \"ALKPHOS\" in the last 72 hours.    Invalid input(s): \"ALB\"     No results for input(s): \"LIPASE\", \"AMYLASE\" in the last 72 hours.     Recent Labs     06/17/25  0530   INR 1.07       ASSESSMENT/PLAN: Florida Saleem is a 69 y.o. female  with Hx of HTN, HLD, DM, COPD, ESRD on hemodialysis (Tu/Th/Sat), parathyroidectomy (8/2024), appendectomy, cholecystectomy, chronic abdominal pain, and AAA s/p endovascular repair (9/2023).  Vascular consulted regarding newly-noted endoleak in setting of aneurysm sac growth (6.1cm from 5.8cm Nov

## 2025-06-18 NOTE — PLAN OF CARE
Problem: Chronic Conditions and Co-morbidities  Goal: Patient's chronic conditions and co-morbidity symptoms are monitored and maintained or improved  Outcome: Progressing  Flowsheets (Taken 6/17/2025 2015)  Care Plan - Patient's Chronic Conditions and Co-Morbidity Symptoms are Monitored and Maintained or Improved: Monitor and assess patient's chronic conditions and comorbid symptoms for stability, deterioration, or improvement     Problem: Discharge Planning  Goal: Discharge to home or other facility with appropriate resources  6/18/2025 0623 by Rajendra Mae, RN  Outcome: Progressing  Flowsheets (Taken 6/17/2025 2015)  Discharge to home or other facility with appropriate resources: Identify barriers to discharge with patient and caregiver  6/17/2025 1627 by Jasmyne Kinney RN  Outcome: Progressing  Flowsheets (Taken 6/17/2025 1627)  Discharge to home or other facility with appropriate resources:   Identify barriers to discharge with patient and caregiver   Identify discharge learning needs (meds, wound care, etc)     Problem: Pain  Goal: Verbalizes/displays adequate comfort level or baseline comfort level  6/18/2025 0623 by Rajendra Mae, RN  Outcome: Progressing  Flowsheets (Taken 6/17/2025 2015)  Verbalizes/displays adequate comfort level or baseline comfort level: Encourage patient to monitor pain and request assistance  6/17/2025 1627 by Jasmyne Kinney RN  Outcome: Not Progressing  Flowsheets (Taken 6/17/2025 1627)  Verbalizes/displays adequate comfort level or baseline comfort level:   Encourage patient to monitor pain and request assistance   Assess pain using appropriate pain scale   Administer analgesics based on type and severity of pain and evaluate response   Implement non-pharmacological measures as appropriate and evaluate response   Notify Licensed Independent Practitioner if interventions unsuccessful or patient reports new pain  Note: PRN pain management as ordered. See MAR and flowsheets      Problem: Pain  Goal: Verbalizes/displays adequate comfort level or baseline comfort level  6/18/2025 0623 by Rajendra Mae, RN  Outcome: Progressing  Flowsheets (Taken 6/17/2025 2015)  Verbalizes/displays adequate comfort level or baseline comfort level: Encourage patient to monitor pain and request assistance  6/17/2025 1627 by Jasmyne Kinney, RN  Outcome: Not Progressing  Flowsheets (Taken 6/17/2025 1627)  Verbalizes/displays adequate comfort level or baseline comfort level:   Encourage patient to monitor pain and request assistance   Assess pain using appropriate pain scale   Administer analgesics based on type and severity of pain and evaluate response   Implement non-pharmacological measures as appropriate and evaluate response   Notify Licensed Independent Practitioner if interventions unsuccessful or patient reports new pain  Note: PRN pain management as ordered. See MAR and flowsheets

## 2025-06-18 NOTE — PROGRESS NOTES
V2.0    [facility] Progress Note      Name:  Florida Saleem /Age/Sex: 1955  (69 y.o. female)   MRN & CSN:  [id number] & [id number] Encounter Date/Time: 2025 11:40 AM EDT   Location:  [facility]/4303-01 PCP: Kim Liu MD     Attending:Cheryl Kaur MD       Hospital Day: 9    HPI:    Assessment and Recommendations     Hospital course:    Florida Saleem is a 69 y.o. female with pmh of ESRD, hypertension, hyperlipidemia, gout, polycystic kidney, psychosis, diabetes, AAA s/p repair, asthma/COPD who presents with multiple nonspecific symptoms.  Has had multiple ED admissions for shortness of breath cough and wheezing.  Patient was found to have ESBL from  and was started on Bactrim at that time.  Patient went for her dialysis and had some shortness of breath and was sent to the ED.  ED patient's heart rate was in the 40s to 60s, EKG showed sinus bradycardia with first-degree block along with UTI due to extended-spectrum beta lactamase (ESBL) producing Escherichia coli.  CT scan done for abdominal pain showed a patent endograft with possible end of leak.  Vascular surgery consulted for possible ablation/embolization      Plan:       ESBL UTI  - Day     Severe left shoulder pain with difficulty moving the arm  - X-ray shoulder unremarkable  - Ortho consulted, consult appreciated  - PT OT to work with the patient      Abdominal pain with history of AAA s/p repair with endoleak-vascular surgery.  Repeat CT chest abdomen  -Repeat CTA on  shows no dissection  -CT of the abdomen in  showed patent aortic endograft.  CTA concerning for possible endoleak  -Vascular surgery consulted, no intervention needed at this present.  Will possibly need ablation at a later date, possibly tomorrow    Asthma/COPD  - Home meds    ESRD with electrolyte abnormality  -Nephrology consulted for dialysis    Complex renal cyst with polycystic kidney disease  Urology consulted.  S/p cyst drainage on     Sinus  <50,000 CFU/ml mixed skin/urogenital betsy. No further workup    LABURIN  06/10/2025 02:07 PM     >100,000 CFU/ml  CONTACT PRECAUTIONS INDICATED  Carbapenem antibiotics are the best option for infections caused  by ESBL producing organisms.  Other antibiotic classes are  likely to result in treatment failure, even for organisms  demonstrating in vitro susceptibility.       Blood Cultures:   Lab Results   Component Value Date/Time    BC No Growth after 4 days of incubation. 07/25/2021 08:54 PM     Lab Results   Component Value Date/Time    BLOODCULT2 No Growth after 4 days of incubation. 07/25/2021 08:54 PM     Organism:   Lab Results   Component Value Date/Time    ORG Escherichia coli ESBL 06/10/2025 02:07 PM         Electronically signed by Cheryl Kaur MD on 6/18/2025 at 7:28 AM  Comment: Please note this report has been produced using speech recognition software and may contain errors related to that system including errors in grammar, punctuation, and spelling, as well as words and phrases that may be inappropriate. If there are any questions or concerns, please feel free to contact the dictating provider for clarification.

## 2025-06-18 NOTE — CARE COORDINATION
Discharge planning update:    Per physician:Vascular surgery consulted, no intervention needed at this present. Will possibly need ablation at a later date, possibly tomorrow     Patient is from [facility] as a long term care resident. She receives HD on TTS at [facility]. She is wheelchair bound.      Current discharge plan is to return to [facility] as a LTC resident (no precert needed) with resumption of HD at [facility].     Case mgt will continue to follow.

## 2025-06-18 NOTE — PLAN OF CARE
Problem: Chronic Conditions and Co-morbidities  Goal: Patient's chronic conditions and co-morbidity symptoms are monitored and maintained or improved  6/18/2025 1349 by Shirley Wiley RN  Outcome: Progressing  Flowsheets (Taken 6/18/2025 1349)  Care Plan - Patient's Chronic Conditions and Co-Morbidity Symptoms are Monitored and Maintained or Improved:   Monitor and assess patient's chronic conditions and comorbid symptoms for stability, deterioration, or improvement   Collaborate with multidisciplinary team to address chronic and comorbid conditions and prevent exacerbation or deterioration   Update acute care plan with appropriate goals if chronic or comorbid symptoms are exacerbated and prevent overall improvement and discharge  Note: V/S stable.  HR sinus to sinus brandi.  C/O pain.  Pain medication administered per orders.     Problem: Discharge Planning  Goal: Discharge to home or other facility with appropriate resources  6/18/2025 1349 by Shirley Wiley RN  Outcome: Progressing  Flowsheets (Taken 6/18/2025 1349)  Discharge to home or other facility with appropriate resources:   Identify barriers to discharge with patient and caregiver   Arrange for needed discharge resources and transportation as appropriate   Identify discharge learning needs (meds, wound care, etc)     Problem: Pain  Goal: Verbalizes/displays adequate comfort level or baseline comfort level  6/18/2025 1349 by Shirley Wiley RN  Outcome: Progressing  Flowsheets (Taken 6/18/2025 1349)  Verbalizes/displays adequate comfort level or baseline comfort level:   Encourage patient to monitor pain and request assistance   Assess pain using appropriate pain scale   Administer analgesics based on type and severity of pain and evaluate response   Implement non-pharmacological measures as appropriate and evaluate response     Problem: Safety - Adult  Goal: Free from fall injury  Outcome: Progressing  Flowsheets (Taken 6/18/2025 1349)  Free

## 2025-06-18 NOTE — PROGRESS NOTES
Nephrology Progress Note                                                                                                                                                                                                                                                                                                                                                               Office : [phone]     Fax :[phone]    Patient's Name: Florida Saleem  6/18/2025    Reason for Consult:  ESRD on HD   Requesting Physician:  Kim Liu MD  Chief Complaint:    Chief Complaint   Patient presents with    Shortness of Breath     Pt presents to ED from dialysis d/t low heart rate and c/o SOB. Pt did not receive HD tx today. Reports that her eyes and nose have been burning d/t the air at facility she lives. Pt c/o dizziness and chest tightness.       Assessment/Plan     # ESRD on HD   - Dialyzes every TTS as an outpatient  - Plan for HD today   - Continue Torsemide as patient still urinates   - Renally dose meds  - Monitor renal labs     # HTN  - BP's currently acceptable  - On Metoprolol & Nifedipine  - Monitor     # Anemia of chronic disease   - Hgb at goal for ESRD  - Monitor     # Acid- base/ Electrolyte imbalance   - HD schedule, as above   - Low K diet   - Lokelma PRN    # MBD management  # H/o parathyroidectomy  - Sevelamer with meals  - Low phos diet     # Abdominal pain  # Left complex renal cyst   # H/o AAA s/p repair  - CTA - evidence of endo leak. Will ultimately need ablation   - Vascular surgery on board ; no acute intervention at this time  - Urology consulted to further evaluate cyst - s/p intervention with IR on 6/16    # Sinus bradycardia  # PAF  - Continue home meds     # Possible UTI  # H/o ESBL UTI  - Abx management per primary        History of Present Ilness:    Florida Saleem is a 69 y.o. female with a PMH of ESRD on HD, HTN, HLD, PCKD, DM2, AAA s/p repair, who presented to the ER with multiple

## 2025-06-19 LAB
ALBUMIN SERPL-MCNC: 3.3 G/DL (ref 3.4–5)
ANION GAP SERPL CALCULATED.3IONS-SCNC: 14 MMOL/L (ref 3–16)
BUN SERPL-MCNC: 43 MG/DL (ref 7–20)
CALCIUM SERPL-MCNC: 8 MG/DL (ref 8.3–10.6)
CHLORIDE SERPL-SCNC: 97 MMOL/L (ref 99–110)
CO2 SERPL-SCNC: 20 MMOL/L (ref 21–32)
CREAT SERPL-MCNC: 8.1 MG/DL (ref 0.6–1.2)
GFR SERPLBLD CREATININE-BSD FMLA CKD-EPI: 5 ML/MIN/{1.73_M2}
GLUCOSE SERPL-MCNC: 95 MG/DL (ref 70–99)
PHOSPHATE SERPL-MCNC: 4.8 MG/DL (ref 2.5–4.9)
POTASSIUM SERPL-SCNC: 5.2 MMOL/L (ref 3.5–5.1)
SODIUM SERPL-SCNC: 131 MMOL/L (ref 136–145)

## 2025-06-19 PROCEDURE — 6370000000 HC RX 637 (ALT 250 FOR IP)

## 2025-06-19 PROCEDURE — 6370000000 HC RX 637 (ALT 250 FOR IP): Performed by: HOSPITALIST

## 2025-06-19 PROCEDURE — 2500000003 HC RX 250 WO HCPCS: Performed by: HOSPITALIST

## 2025-06-19 PROCEDURE — 90935 HEMODIALYSIS ONE EVALUATION: CPT

## 2025-06-19 PROCEDURE — 80069 RENAL FUNCTION PANEL: CPT

## 2025-06-19 PROCEDURE — 6360000002 HC RX W HCPCS

## 2025-06-19 PROCEDURE — 1200000000 HC SEMI PRIVATE

## 2025-06-19 PROCEDURE — 99233 SBSQ HOSP IP/OBS HIGH 50: CPT | Performed by: INTERNAL MEDICINE

## 2025-06-19 RX ORDER — LORAZEPAM 2 MG/ML
0.5 INJECTION INTRAMUSCULAR ONCE
Status: COMPLETED | OUTPATIENT
Start: 2025-06-19 | End: 2025-06-19

## 2025-06-19 RX ORDER — OXYCODONE AND ACETAMINOPHEN 5; 325 MG/1; MG/1
1 TABLET ORAL ONCE
Refills: 0 | Status: COMPLETED | OUTPATIENT
Start: 2025-06-19 | End: 2025-06-19

## 2025-06-19 RX ADMIN — AMIODARONE HYDROCHLORIDE 200 MG: 200 TABLET ORAL at 21:02

## 2025-06-19 RX ADMIN — LORAZEPAM 0.5 MG: 2 INJECTION INTRAMUSCULAR; INTRAVENOUS at 04:45

## 2025-06-19 RX ADMIN — SEVELAMER CARBONATE 1600 MG: 800 TABLET, FILM COATED ORAL at 18:18

## 2025-06-19 RX ADMIN — AMIODARONE HYDROCHLORIDE 200 MG: 200 TABLET ORAL at 09:13

## 2025-06-19 RX ADMIN — ATORVASTATIN CALCIUM 40 MG: 40 TABLET, FILM COATED ORAL at 21:10

## 2025-06-19 RX ADMIN — CETIRIZINE HYDROCHLORIDE 5 MG: 10 TABLET, FILM COATED ORAL at 09:13

## 2025-06-19 RX ADMIN — POLYETHYLENE GLYCOL 3350 17 G: 17 POWDER, FOR SOLUTION ORAL at 21:02

## 2025-06-19 RX ADMIN — TORSEMIDE 40 MG: 20 TABLET ORAL at 09:12

## 2025-06-19 RX ADMIN — SEVELAMER CARBONATE 1600 MG: 800 TABLET, FILM COATED ORAL at 09:12

## 2025-06-19 RX ADMIN — OXYCODONE 5 MG: 5 TABLET ORAL at 18:18

## 2025-06-19 RX ADMIN — SODIUM CHLORIDE, PRESERVATIVE FREE 10 ML: 5 INJECTION INTRAVENOUS at 21:02

## 2025-06-19 RX ADMIN — SODIUM CHLORIDE, PRESERVATIVE FREE 10 ML: 5 INJECTION INTRAVENOUS at 13:31

## 2025-06-19 RX ADMIN — DIPHENHYDRAMINE HYDROCHLORIDE 25 MG: 25 TABLET ORAL at 23:35

## 2025-06-19 RX ADMIN — ASPIRIN 81 MG: 81 TABLET, COATED ORAL at 09:13

## 2025-06-19 RX ADMIN — OXYCODONE HYDROCHLORIDE AND ACETAMINOPHEN 1 TABLET: 5; 325 TABLET ORAL at 23:30

## 2025-06-19 RX ADMIN — ACETAMINOPHEN 650 MG: 325 TABLET ORAL at 21:08

## 2025-06-19 RX ADMIN — OXYCODONE 5 MG: 5 TABLET ORAL at 09:25

## 2025-06-19 RX ADMIN — METOPROLOL SUCCINATE 50 MG: 50 TABLET, EXTENDED RELEASE ORAL at 21:01

## 2025-06-19 RX ADMIN — NIFEDIPINE 60 MG: 30 TABLET, EXTENDED RELEASE ORAL at 09:12

## 2025-06-19 RX ADMIN — OXYCODONE 5 MG: 5 TABLET ORAL at 01:42

## 2025-06-19 RX ADMIN — PANTOPRAZOLE SODIUM 40 MG: 40 TABLET, DELAYED RELEASE ORAL at 18:18

## 2025-06-19 RX ADMIN — PANTOPRAZOLE SODIUM 40 MG: 40 TABLET, DELAYED RELEASE ORAL at 07:20

## 2025-06-19 ASSESSMENT — PAIN - FUNCTIONAL ASSESSMENT
PAIN_FUNCTIONAL_ASSESSMENT: PREVENTS OR INTERFERES SOME ACTIVE ACTIVITIES AND ADLS

## 2025-06-19 ASSESSMENT — PAIN DESCRIPTION - PAIN TYPE
TYPE: ACUTE PAIN

## 2025-06-19 ASSESSMENT — PAIN DESCRIPTION - ORIENTATION
ORIENTATION: LOWER;MID

## 2025-06-19 ASSESSMENT — PAIN DESCRIPTION - LOCATION
LOCATION: ABDOMEN;BACK
LOCATION: ABDOMEN
LOCATION: ABDOMEN;BACK
LOCATION: ABDOMEN;BACK

## 2025-06-19 ASSESSMENT — PAIN SCALES - GENERAL
PAINLEVEL_OUTOF10: 8
PAINLEVEL_OUTOF10: 10
PAINLEVEL_OUTOF10: 8
PAINLEVEL_OUTOF10: 10
PAINLEVEL_OUTOF10: 9

## 2025-06-19 ASSESSMENT — PAIN DESCRIPTION - ONSET
ONSET: ON-GOING

## 2025-06-19 ASSESSMENT — PAIN DESCRIPTION - DESCRIPTORS
DESCRIPTORS: ACHING;SORE
DESCRIPTORS: ACHING;SHOOTING
DESCRIPTORS: ACHING;SORE

## 2025-06-19 ASSESSMENT — PAIN DESCRIPTION - FREQUENCY
FREQUENCY: CONTINUOUS

## 2025-06-19 NOTE — PROGRESS NOTES
Treatment time: 3 hours     Net UF: 1 liter    Pre weight: 116.5 kg  Post weight: 115.5 kg    Access used: Left CVC  Access function: Access site Located on the left chest wall had clean dry and intact CVC dressing. No signs of infection noted. No redness, hematoma nor swelling was observed. No discharges was seen. Both ports had good flow.     Medications or blood products given: heparin dwell    Regular outpatient schedule: Tuesday, Thursday, Saturday.    Summary of response to treatment:12:38: Treatment set at 1 liter for 3 hours as ordered via Right CVC. Access site Located on the left chest wall had clean dry and intact CVC dressing. No signs of infection noted. No redness, hematoma nor swelling was observed. No discharges was seen. Both ports had good flow. Parameters set accordingly. Hooked to HD machine. Monitored from time to time. 15:38: Treatment completed. Returned blood aseptically. Hd tolerated well.     Copy of dialysis treatment record placed in chart, to be scanned into EMR.

## 2025-06-19 NOTE — PROGRESS NOTES
Spiritual Health History and Assessment/Progress Note  [facility]    Loneliness/Social Isolation,  ,  ,  Spiritual care introduced to patient    Name: Florida Saleem MRN: [id number]    Age: 69 y.o.     Sex: female   Language: English   Mandaen: Non-Moravian   UTI due to extended-spectrum beta lactamase (ESBL) producing Escherichia coli     Date: 6/19/2025            Total Time Calculated: (P) 14 min              Spiritual Assessment began in [facility] 4 PCU        Referral/Consult From: Rounding   Encounter Overview/Reason: Loneliness/Social Isolation  Service Provided For: Patient    Kassi, Belief, Meaning:   Patient has beliefs or practices that help with coping during difficult times  Family/Friends No family/friends present      Importance and Influence:  Patient has no beliefs influential to healthcare decision-making identified during this visit  Family/Friends No family/friends present    Community:  Patient feels well-supported. Support system includes: Children  Family/Friends No family/friends present    Assessment and Plan of Care:     Patient Interventions include: Facilitated expression of thoughts and feelings and Affirmed coping skills/support systems  Family/Friends Interventions include: No family/friends present    Patient Plan of Care: No spiritual needs identified for follow-up and Spiritual Care available upon further referral  Family/Friends Plan of Care: No family/friends present    Electronically signed by PADMINI Sanford on 6/19/2025 at 10:54 AM

## 2025-06-19 NOTE — PROGRESS NOTES
Comprehensive Nutrition Assessment    RECOMMENDATIONS:  PO Diet: Low phos, Low K+ diet  Nutrition Supplement: Monitor nutritional adequacy and need for ONS  Nutrition Education: No recommendation at this time     NUTRITION ASSESSMENT:   Nutritional summary & status: LOS evaluation, patient admitted with SOB, couch and epigastric pain. Patient is s/p aspiration of renal cyst on 6/16 and currently tolerating a regular diet with fair intake of meals. Renal diet restriction in place as lytes are elevated, noted pt to have HD today. RD will continue to monitor po diet intake and possible need for ONS.   Admission // PMH: UTI // ESRD/HD, Gout, HTN, COPD    MALNUTRITION ASSESSMENT  Context of Malnutrition: Acute Illness   Malnutrition Status: At risk for malnutrition  Findings of the 6 clinical characteristics of malnutrition (Minimum of 2 out of 6 clinical characteristics is required to make the diagnosis of moderate or severe Protein Calorie Malnutrition based on AND/ASPEN Guidelines):  Energy Intake:  Mild decrease in energy intake  Weight Loss:  Mild weight loss (7% loss ~6 months)     Body Fat Loss:  No body fat loss     Muscle Mass Loss:  No muscle mass loss    Fluid Accumulation:  No fluid accumulation     Strength:  Not Performed    NUTRITION DIAGNOSIS   Altered nutrition-related lab values related to renal dysfunction as evidenced by lab values    Nutrition Monitoring and Evaluation:   Food/Nutrient Intake Outcomes:  Food and Nutrient Intake  Physical Signs/Symptoms Outcomes:  Biochemical Data, GI Status, Nutrition Focused Physical Findings, Skin, Weight     OBJECTIVE DATA: Significant to nutrition assessment  Nutrition Related Findings: +BM 6/18, Na+ 131, K+ 5.2, Mg 2.52  Wounds: Surgical Incision  Nutrition Goals: by next RD assessment, PO intake 75% or greater     CURRENT NUTRITION THERAPIES  ADULT DIET; Regular; Low Potassium (Less than 3000 mg/day); Low Phosphorus (Less than 1000 mg)  PO Intake: 51-75%,

## 2025-06-19 NOTE — PLAN OF CARE
Problem: Chronic Conditions and Co-morbidities  Goal: Patient's chronic conditions and co-morbidity symptoms are monitored and maintained or improved  Outcome: Progressing  Flowsheets (Taken 6/18/2025 1349 by Shirley Wiley RN)  Care Plan - Patient's Chronic Conditions and Co-Morbidity Symptoms are Monitored and Maintained or Improved:   Monitor and assess patient's chronic conditions and comorbid symptoms for stability, deterioration, or improvement   Collaborate with multidisciplinary team to address chronic and comorbid conditions and prevent exacerbation or deterioration   Update acute care plan with appropriate goals if chronic or comorbid symptoms are exacerbated and prevent overall improvement and discharge     Problem: Discharge Planning  Goal: Discharge to home or other facility with appropriate resources  Outcome: Progressing  Flowsheets (Taken 6/19/2025 0510)  Discharge to home or other facility with appropriate resources:   Identify barriers to discharge with patient and caregiver   Arrange for needed discharge resources and transportation as appropriate   Identify discharge learning needs (meds, wound care, etc)   Refer to discharge planning if patient needs post-hospital services based on physician order or complex needs related to functional status, cognitive ability or social support system     Problem: Pain  Goal: Verbalizes/displays adequate comfort level or baseline comfort level  Outcome: Progressing  Flowsheets (Taken 6/19/2025 0510)  Verbalizes/displays adequate comfort level or baseline comfort level:   Encourage patient to monitor pain and request assistance   Assess pain using appropriate pain scale   Administer analgesics based on type and severity of pain and evaluate response   Implement non-pharmacological measures as appropriate and evaluate response   Consider cultural and social influences on pain and pain management   Notify Licensed Independent Practitioner if interventions

## 2025-06-19 NOTE — PROGRESS NOTES
Nephrology Progress Note                                                                                                                                                                                                                                                                                                                                                               Office : [phone]     Fax :[phone]    Patient's Name: Florida Saleem  6/19/2025    Reason for Consult:  ESRD on HD   Requesting Physician:  Kim Liu MD  Chief Complaint:    Chief Complaint   Patient presents with    Shortness of Breath     Pt presents to ED from dialysis d/t low heart rate and c/o SOB. Pt did not receive HD tx today. Reports that her eyes and nose have been burning d/t the air at facility she lives. Pt c/o dizziness and chest tightness.       Assessment/Plan     # ESRD on HD   - Dialyzes every TTS as an outpatient  - Plan for HD again today   - Continue Torsemide as patient still urinates   - Renally dose meds  - Monitor renal labs     # HTN  - BP's currently acceptable  - On Metoprolol & Nifedipine  - Monitor     # Anemia of chronic disease   - Hgb at goal for ESRD  - Monitor     # Acid- base/ Electrolyte imbalance   - HD schedule, as above   - Low K diet   - Lokelma PRN    # MBD management  # H/o parathyroidectomy  - Sevelamer with meals  - Low phos diet     # Abdominal pain  # Left complex renal cyst   # H/o AAA s/p repair  - CTA - evidence of endo leak. Will ultimately need ablation   - Vascular surgery on board ; no acute intervention at this time  - Urology consulted to further evaluate cyst - s/p intervention with IR on 6/16    # Sinus bradycardia  # PAF  - Continue home meds     # Possible UTI  # H/o ESBL UTI  - Abx management per primary        History of Present Ilness:    Florida Saleem is a 69 y.o. female with a PMH of ESRD on HD, HTN, HLD, PCKD, DM2, AAA s/p repair, who presented to the ER with

## 2025-06-19 NOTE — PROGRESS NOTES
V2.0    [facility] [place] Progress Note      Name:  Florida Saleem /Age/Sex: 1955  (69 y.o. female)   MRN & CSN:  [id number] & [id number] Encounter Date/Time: 2025 11:40 AM EDT   Location:  [facility] [place]/4303-01 PCP: Kim Liu MD     Attending:Cheryl Kaur MD       Hospital Day: 10    HPI:    Assessment and Recommendations     Hospital course:    Florida Saleem is a 69 y.o. female with pmh of ESRD, hypertension, hyperlipidemia, gout, polycystic kidney, psychosis, diabetes, AAA s/p repair, asthma/COPD who presents with multiple nonspecific symptoms.  Has had multiple ED admissions for shortness of breath cough and wheezing.  Patient was found to have ESBL from  and was started on Bactrim at that time.  Patient went for her dialysis and had some shortness of breath and was sent to the ED.  ED patient's heart rate was in the 40s to 60s, EKG showed sinus bradycardia with first-degree block along with UTI due to extended-spectrum beta lactamase (ESBL) producing Escherichia coli.  CT scan done for abdominal pain showed a patent endograft with possible end of leak.  Vascular surgery consulted for possible ablation/embolization      Plan:       ESBL UTI  - Day     Severe left shoulder pain with difficulty moving the arm-slowly improving  - X-ray shoulder unremarkable  - Ortho consulted, consult appreciated  - PT OT to work with the patient      Abdominal pain with history of AAA s/p repair with endoleak-vascular surgery.  Repeat CT chest abdomen  -Repeat CTA on  shows no dissection  -CT of the abdomen in  showed patent aortic endograft.  CTA concerning for possible endoleak  -Vascular surgery consulted, no intervention needed at this present.  Will possibly need ablation at a later date, possibly tomorrow    Asthma/COPD  - Home meds    ESRD with electrolyte abnormality-discussed with nephrology patient to get dialysis today  -Nephrology consulted for dialysis    Complex renal cyst with polycystic  Dyspnea     Views Obtained & Images Saved for These Views:          The pericardial sac, myocardium, 4 chambers, and IVC were identified using the following views::          ALL OF THE VIEWS ABOVE WERE OBTAINED     Findings:          Pericardial Effusion:  No pericardial effusion         Cardiac Activity:  Cardiac activity normal         LV function:  Normal (> 50% EF)         RV diameter:  Indeterminate         IVC collapsibility:  Low collapsibility (<50%)     Interpretation:          Normal limited cardiac ultrasound  Electronically signed by Epi Thakkra on Saturday, June 14, 2025 at 6:06 PM : Epi Thakkar Attending:     CTA CHEST ABDOMEN PELVIS W CONTRAST  Result Date: 6/11/2025  CTA CHEST ABDOMEN PELVIS W CONTRAST Indication: further eval of SOB. Abd pain - furthe eval of AAA shortness of breath abdominal pain abdominal aortic aneurysm. Technique: Helical CT images of the chest, abdomen and pelvis were acquired after the administration of intravenous contrast media. Axial, coronal and sagittal reformatted images were constructed from the raw data set. 3-dimensional vascular reconstructions were provided for review. Individualized dose optimization technique was used in order to meet ALARA standards for radiation dose reduction.  In addition to vendor specific dose reduction algorithms, the dose reduction techniques vary based on the specific scanner utilized but frequently include automated exposure control, adjustment of the mA and/or kV according to patient size, and use of iterative reconstruction technique. COMPARISON: June 4, 2025 Of note, noncontrasted study was not obtained. Endoleak protocol was also not ordered. Findings: Vascular findings: The ascending thoracic aorta is not aneurysmal measuring up to 3.6 x 3.6 cm at the level of the main pulmonary artery. The descending thoracic aorta measures 3.3 x 3.1 cm at this level. There is no aortic dissection. Great vessel anatomy is

## 2025-06-20 ENCOUNTER — APPOINTMENT (OUTPATIENT)
Dept: ULTRASOUND IMAGING | Age: 70
End: 2025-06-20
Payer: MEDICAID

## 2025-06-20 VITALS
SYSTOLIC BLOOD PRESSURE: 141 MMHG | DIASTOLIC BLOOD PRESSURE: 75 MMHG | WEIGHT: 256.84 LBS | HEART RATE: 65 BPM | BODY MASS INDEX: 40.31 KG/M2 | RESPIRATION RATE: 16 BRPM | HEIGHT: 67 IN | TEMPERATURE: 97.5 F | OXYGEN SATURATION: 98 %

## 2025-06-20 LAB
ALBUMIN SERPL-MCNC: 3.4 G/DL (ref 3.4–5)
ALBUMIN/GLOB SERPL: 0.9 {RATIO} (ref 1.1–2.2)
ALP SERPL-CCNC: 77 U/L (ref 40–129)
ALT SERPL-CCNC: 26 U/L (ref 10–40)
ANION GAP SERPL CALCULATED.3IONS-SCNC: 12 MMOL/L (ref 3–16)
AST SERPL-CCNC: 26 U/L (ref 15–37)
BILIRUB SERPL-MCNC: 0.3 MG/DL (ref 0–1)
BUN SERPL-MCNC: 31 MG/DL (ref 7–20)
CALCIUM SERPL-MCNC: 8.3 MG/DL (ref 8.3–10.6)
CHLORIDE SERPL-SCNC: 103 MMOL/L (ref 99–110)
CO2 SERPL-SCNC: 22 MMOL/L (ref 21–32)
CREAT SERPL-MCNC: 6.4 MG/DL (ref 0.6–1.2)
GFR SERPLBLD CREATININE-BSD FMLA CKD-EPI: 7 ML/MIN/{1.73_M2}
GLUCOSE SERPL-MCNC: 89 MG/DL (ref 70–99)
POTASSIUM SERPL-SCNC: 4.6 MMOL/L (ref 3.5–5.1)
PROT SERPL-MCNC: 7.1 G/DL (ref 6.4–8.2)
SODIUM SERPL-SCNC: 137 MMOL/L (ref 136–145)

## 2025-06-20 PROCEDURE — 36415 COLL VENOUS BLD VENIPUNCTURE: CPT

## 2025-06-20 PROCEDURE — 6370000000 HC RX 637 (ALT 250 FOR IP): Performed by: HOSPITALIST

## 2025-06-20 PROCEDURE — 99233 SBSQ HOSP IP/OBS HIGH 50: CPT | Performed by: INTERNAL MEDICINE

## 2025-06-20 PROCEDURE — 6370000000 HC RX 637 (ALT 250 FOR IP)

## 2025-06-20 PROCEDURE — 80053 COMPREHEN METABOLIC PANEL: CPT

## 2025-06-20 RX ORDER — OXYCODONE AND ACETAMINOPHEN 5; 325 MG/1; MG/1
1 TABLET ORAL EVERY 4 HOURS PRN
Qty: 9 TABLET | Refills: 0 | Status: SHIPPED | OUTPATIENT
Start: 2025-06-20 | End: 2025-06-23

## 2025-06-20 RX ORDER — NIFEDIPINE 30 MG
60 TABLET, EXTENDED RELEASE ORAL DAILY
Qty: 30 TABLET | Refills: 3
Start: 2025-06-21

## 2025-06-20 RX ADMIN — ASPIRIN 81 MG: 81 TABLET, COATED ORAL at 09:30

## 2025-06-20 RX ADMIN — OXYCODONE 5 MG: 5 TABLET ORAL at 07:30

## 2025-06-20 RX ADMIN — AMIODARONE HYDROCHLORIDE 200 MG: 200 TABLET ORAL at 09:30

## 2025-06-20 RX ADMIN — Medication 3 MG: at 01:56

## 2025-06-20 RX ADMIN — CETIRIZINE HYDROCHLORIDE 5 MG: 10 TABLET, FILM COATED ORAL at 09:30

## 2025-06-20 RX ADMIN — OXYCODONE 5 MG: 5 TABLET ORAL at 01:47

## 2025-06-20 RX ADMIN — ACETAMINOPHEN 650 MG: 325 TABLET ORAL at 05:55

## 2025-06-20 RX ADMIN — ACETAMINOPHEN 650 MG: 325 TABLET ORAL at 12:39

## 2025-06-20 RX ADMIN — NIFEDIPINE 60 MG: 30 TABLET, EXTENDED RELEASE ORAL at 09:30

## 2025-06-20 RX ADMIN — SEVELAMER CARBONATE 1600 MG: 800 TABLET, FILM COATED ORAL at 12:39

## 2025-06-20 RX ADMIN — SEVELAMER CARBONATE 1600 MG: 800 TABLET, FILM COATED ORAL at 09:31

## 2025-06-20 RX ADMIN — PANTOPRAZOLE SODIUM 40 MG: 40 TABLET, DELAYED RELEASE ORAL at 05:37

## 2025-06-20 RX ADMIN — TORSEMIDE 40 MG: 20 TABLET ORAL at 09:29

## 2025-06-20 ASSESSMENT — PAIN DESCRIPTION - ORIENTATION: ORIENTATION: LOWER

## 2025-06-20 ASSESSMENT — PAIN SCALES - GENERAL
PAINLEVEL_OUTOF10: 10
PAINLEVEL_OUTOF10: 6
PAINLEVEL_OUTOF10: 6
PAINLEVEL_OUTOF10: 10
PAINLEVEL_OUTOF10: 3

## 2025-06-20 ASSESSMENT — PAIN DESCRIPTION - LOCATION
LOCATION: ABDOMEN

## 2025-06-20 NOTE — PROGRESS NOTES
Nephrology Progress Note                                                                                                                                                                                                                                                                                                                                                               Office : [phone]     Fax :[phone]    Patient's Name: Florida Saleem  6/20/2025    Reason for Consult:  ESRD on HD   Requesting Physician:  Kim Liu MD  Chief Complaint:    Chief Complaint   Patient presents with    Shortness of Breath     Pt presents to ED from dialysis d/t low heart rate and c/o SOB. Pt did not receive HD tx today. Reports that her eyes and nose have been burning d/t the air at facility she lives. Pt c/o dizziness and chest tightness.       Assessment/Plan     # ESRD on HD   - Dialyzes every TTS as an outpatient  - Plan for next HD on Saturday   - Continue Torsemide as patient still urinates   - Renally dose meds  - Monitor renal labs     # HTN  - BP's currently acceptable  - On Metoprolol & Nifedipine  - Monitor     # Anemia of chronic disease   - Hgb at goal for ESRD  - Monitor     # Acid- base/ Electrolyte imbalance   - HD schedule, as above   - Low K diet   - Lokelma PRN    # MBD management  # H/o parathyroidectomy  - Sevelamer with meals  - Low phos diet     # Abdominal pain  # Left complex renal cyst   # H/o AAA s/p repair  - CTA - evidence of endo leak. Will ultimately need ablation   - Vascular surgery on board ; no acute intervention at this time  - Urology consulted to further evaluate cyst - s/p intervention with IR on 6/16    # Sinus bradycardia  # PAF  - Continue home meds     # Possible UTI  # H/o ESBL UTI  - Abx management per primary        History of Present Ilness:    Florida Saleem is a 69 y.o. female with a PMH of ESRD on HD, HTN, HLD, PCKD, DM2, AAA s/p repair, who presented to the ER with   Abilify [aripiprazole], Baclofen, Celecoxib, Diclofenac, Fentanyl, Hydrocodone, Ibuprofen, Mirtazapine, Morphine, Naproxen, Paroxetine, Penicillins, Quetiapine fumarate, Sertraline, and Trazodone and nefazodone    Current Medications:    No current facility-administered medications for this encounter.    Physical exam:     Vitals:  BP (!) 141/75   Pulse 65   Temp 97.5 °F (36.4 °C) (Axillary)   Resp 16   Ht 1.702 m (5' 7\")   Wt 116.5 kg (256 lb 13.4 oz)   SpO2 98%   BMI 40.23 kg/m²   Constitutional:  OAA X3 NAD Yes  Skin: no rash, turgor wnl  Heent:  eomi, mmm  Neck: no bruits or jvd noted  Cardiovascular:  S1, S2 without m/r/g  Respiratory: CTA B without w/r/r. On RA  Abdomen:  soft, nd  Ext:  lower extremity edema No  Psychiatric: mood and affect flat  Musculoskeletal:  Rom, muscular strength intact    Data:   Labs:  CBC:   Recent Labs     06/18/25  0433   WBC 9.9   HGB 10.7*        BMP:    Recent Labs     06/18/25  0433 06/19/25  0306 06/20/25  0542   * 131* 137   K 5.9* 5.2* 4.6   CL 95* 97* 103   CO2 20* 20* 22   BUN 59* 43* 31*   CREATININE 10.8* 8.1* 6.4*   GLUCOSE 85 95 89     Ca/Mg/Phos:   Recent Labs     06/18/25  0433 06/19/25  0306 06/20/25  0542   CALCIUM 7.9* 8.0* 8.3   MG 2.52*  --   --    PHOS 5.8* 4.8  --      Hepatic:   Recent Labs     06/20/25  0542   AST 26   ALT 26   BILITOT 0.3   ALKPHOS 77       Troponin: No results for input(s): \"TROPONINI\" in the last 72 hours.  BNP: No results for input(s): \"BNP\" in the last 72 hours.  Lipids: No results for input(s): \"CHOL\", \"TRIG\", \"HDL\" in the last 72 hours.    Invalid input(s): \"LDLCALC\", \"LABVLDL\"  ABGs: No results for input(s): \"PHART\", \"PO2ART\", \"KQP1LIG\" in the last 72 hours.  INR:   No results for input(s): \"INR\" in the last 72 hours.      UA:  No results for input(s): \"COLORU\", \"CLARITYU\", \"GLUCOSEU\", \"BILIRUBINUR\", \"KETUA\", \"SPECGRAV\", \"BLOODU\", \"PHUR\", \"PROTEINU\", \"UROBILINOGEN\", \"NITRU\", \"LEUKOCYTESUR\", \"URINETYPE\" in the

## 2025-06-20 NOTE — DISCHARGE INSTR - COC
Continuity of Care Form    Patient Name: Florida Saleem   :  1955  MRN:  [id number]    Admit date:  6/10/2025  Discharge date:  25    Code Status Order: Full Code   Advance Directives:     Admitting Physician:  Sukhdeep Crespo MD  PCP: Kim Liu MD    Discharging Nurse: WYATT Jenkins  Discharging Hospital Unit/Room#: 4303/4303-01  Discharging Unit Phone Number: [phone]    Emergency Contact:   Extended Emergency Contact Information  Primary Emergency Contact: Dedra Loaiza  Home Phone: [phone]  Relation: Child  Secondary Emergency Contact: Mitzy Saleem  Home Phone: [phone]  Mobile Phone: [phone]  Relation: Child    Past Surgical History:  Past Surgical History:   Procedure Laterality Date    ABDOMINAL AORTIC ANEURYSM REPAIR, ENDOVASCULAR N/A 2023    ABDOMINAL AORTIC ANEURYSM REPAIR ENDOVASCULAR performed by Sofiya Alonzo MD at [facility] OR    APPENDECTOMY      CHOLECYSTECTOMY      COLONOSCOPY N/A 2024    COLONOSCOPY BIOPSY/ STOOL ASPIRATE performed by Laura Roberson MD at [facility] ENDOSCOPY    CT GUIDED RENAL CYST ASPIRATION  2025    CT GUIDED RENAL CYST ASPIRATION 2025 [facility] CT SCAN    IR TUNNELED CVC PLACE WO SQ PORT/PUMP > 5 YEARS  2021    IR TUNNELED CATHETER PLACEMENT GREATER THAN 5 YEARS 2021 [facility] SPECIAL PROCEDURES    KIDNEY CYST REMOVAL         Immunization History:     There is no immunization history on file for this patient.    Active Problems:  Patient Active Problem List   Diagnosis Code    Gout M10.9    ACS (acute coronary syndrome) (HCC) I24.9    Acute renal failure N17.9    ARF (acute renal failure) N17.9    Essential hypertension I10    Anemia of chronic renal failure, stage 5 (HCC) N18.5, D63.1    Electrolyte imbalance E87.8    Secondary hypertension I15.9    AMS (altered mental status) R41.82    Acute pancreatitis K85.90    Adjustment disorder with mixed disturbance of emotions and conduct F43.25    Anxiety F41.9    Arthritis of  hospital.  Patient will need outpatient follow-up with IR for management of endoleak and to follow-up with Dr. Alonzo in 6 months for CTA abdomen pelvis to assess progression of endoleak    PHYSICIAN SIGNATURE:  Electronically signed by Cheryl Kaur MD on 6/20/25 at 10:15 AM EDT

## 2025-06-20 NOTE — PROGRESS NOTES
V2.0    [facility] [place] Progress Note      Name:  Florida Saleem /Age/Sex: 1955  (69 y.o. female)   MRN & CSN:  [id number] & [id number] Encounter Date/Time: 2025 11:40 AM EDT   Location:  4303/4303-01 PCP: Kim Liu MD     Attending:Cheryl Kaur MD       Hospital Day: 11    HPI:    Assessment and Recommendations     Hospital course:    Florida Saleem is a 69 y.o. female with pmh of ESRD, hypertension, hyperlipidemia, gout, polycystic kidney, psychosis, diabetes, AAA s/p repair, asthma/COPD who presents with multiple nonspecific symptoms.  Has had multiple ED admissions for shortness of breath cough and wheezing.  Patient was found to have ESBL from  and was started on Bactrim at that time.  Patient went for her dialysis and had some shortness of breath and was sent to the ED.  ED patient's heart rate was in the 40s to 60s, EKG showed sinus bradycardia with first-degree block along with UTI due to extended-spectrum beta lactamase (ESBL) producing Escherichia coli.  CT scan done for abdominal pain showed a patent endograft with possible end of leak.  Patient was treated with meropenem and has finished the course.  Vascular surgery consulted for possible ablation/embolization.  In the hospital patient's hemoglobin has been stable.  Patient refused ablation a few times in the hospital.  Patient will need outpatient follow-up with IR for management of endoleak and to follow-up with Dr. Clinton prakash for CTA abdomen pelvis to assess progression of endoleak      Plan:       ESBL UTI  - Day     Severe left shoulder pain with difficulty moving the arm-slowly improving  - X-ray shoulder unremarkable  - Ortho consulted, consult appreciated  - PT OT to work with the patient      Abdominal pain with history of AAA s/p repair with endoleak-discussed with vascular surgery.  Patient to get an outpatient CTA follow-up with vascular IR for management of endoleak.  CHAY updated  -Repeat CTA on  shows  close serial monitoring  [] Insulin - monitoring serial FSBS for Hypoglycemic adverse drug reaction  [] Anticoagulation requiring serial monitoring of coagulation factors  [] IV/IM Controlled Substances order (any 1)   [] One-time Order including Drug Name/Route, Reason Ordered:   [] Scheduled Order including Drug Name/Route, Reason Ordered or Continued:   [] PRN Order including Drug Name/Route, Reason Ordered or Continued:  []Other -   [] Decision to De-escalate Care this DOS due to change in treatment goals:  [] Decision to Escalate Care To:   [] Major Surgery/Procedure (any 1):   Elective with patient risk factors including Procedure Type and Risk Factors:   Emergent Procedure Type:    ----------------------------------------------------------------------  C. Data (any 2)  [x] Data Review (3+ points)  [x] Consultant Note reviewed with note date, specialty, and summary (1 point each) surgery note reviewed from 6/18 for possible repeat CT  [x] All current labs were reviewed and interpreted for clinical significance   [] Studies Reviewed (1 point each):   [] Collateral history obtained including from who and why needed (Max 1 point):  [x] Independent (Cheryl Kaur MD) Interpretation of tests (any 1)  [x] Rhythm Strip (Telemetry) personally reviewed and interpreted as documented above  : Sinus rhythm  [] Imaging personally reviewed and interpreted, includes:    [x] Discussion (any 1)  [x] Discussed the discharge plan in detail with case management including timing/barriers to discharge, need for support services and/or placement decision   [] Discussed management of the case with:     Subjective:     Chief Complaint: Abdominal pain    Florida Saleem is a 69 y.o. female who presents with abdominal pain    Patient is a poor historian.    Has chronic abdominal pain not worse from before.  Tolerating a diet      Review of Systems:      Pertinent positives and negatives discussed in HPI    Objective:

## 2025-06-20 NOTE — DISCHARGE SUMMARY
V2.0  Discharge Summary    Name:  Florida Saleem /Age/Sex: 1955 (69 y.o. female)   Admit Date: 6/10/2025  Discharge Date: 25    MRN & CSN:  [id number] & [id number] Encounter Date and Time 25 11:38 AM EDT    Attending:  Cheryl Kaur MD Discharging Provider: Cheryl Kaur MD       Hospital Course:     Hospital course:     Florida Saleem is a 69 y.o. female with pmh of ESRD, hypertension, hyperlipidemia, gout, polycystic kidney, psychosis, diabetes, AAA s/p repair, asthma/COPD who presents with multiple nonspecific symptoms.  Has had multiple ED admissions for shortness of breath cough and wheezing.  Patient was found to have ESBL from  and was started on Bactrim at that time.  Patient went for her dialysis and had some shortness of breath and was sent to the ED.  ED patient's heart rate was in the 40s to 60s, EKG showed sinus bradycardia with first-degree block along with UTI due to extended-spectrum beta lactamase (ESBL) producing Escherichia coli.  CT scan done for abdominal pain showed a patent endograft with possible end of leak.  Patient was treated with meropenem and has finished the course.  Vascular surgery consulted for possible ablation/embolization.  In the hospital patient's hemoglobin has been stable.  Patient refused ablation a few times in the hospital.  Patient will need outpatient follow-up with IR for management of endoleak and to follow-up with Dr. Clinton prakash for CTA abdomen pelvis to assess progression of endoleak        Plan:         ESBL UTI  - Day      Severe left shoulder pain with difficulty moving the arm-slowly improving  - X-ray shoulder unremarkable  - Ortho consulted, consult appreciated  - PT OT to work with the patient        Abdominal pain with history of AAA s/p repair with endoleak-discussed with vascular surgery.  Patient to get an outpatient CTA follow-up with vascular IR for management of endoleak.  CHAY updated  -Repeat CTA on   GUIDED RENAL CYST ASPIRATION  Result Date: 6/16/2025  CT GUIDED RENAL CYST ASPIRATION CT-guided biopsy Indication: left renal cyst 12.8cm. Left renal cyst with pain. Symptomatic left renal cyst. : Dr. Epi Castro Procedure: The patient was advised of the benefits, risks, and alternatives of the procedure and informed consent was obtained. A timeout was performed with verification of the patient's name, MRN, and procedure to be performed. The patient was positioned in the lateral position on the CT gantry table. Individualized dose optimization technique was used in order to meet ALARA standards for radiation dose reduction.  In addition to vendor specific dose reduction algorithms, the dose reduction techniques vary based on the specific scanner utilized but frequently include automated exposure control, adjustment of the mA and/or kV according to patient size, and use of iterative reconstruction technique. Preliminary CT was performed, demonstrating the location of the known left renal cyst. Peripheral calcification is noted.. The site was marked and prepped with 2% chlorhexidine and draped with a sterile sheet.  All operators wore hat, mask, and sterile gloves and utilized appropriate hand hygiene. Moderate sedation was performed by the physician including the presence of an independent trained observers that assisted in monitoring the patient's level of consciousness and physiological status. Following the administration of Midazolam and Fentanyl the physician spent continuous face-to-face time with the patient. Sedation start time: 1007 Sedation end time: 1023 Anticipated access site was anesthetized with lidocaine. Small skin incision was made. 17-gauge introducer needle of appropriate length was advanced under intermittent CT fluoroscopy towards the lesion of interest. Once embedded with the lesion, aspiration of approximately 140 cc of filomena-colored serous fluid was performed. A sample was sent for

## 2025-06-20 NOTE — PROGRESS NOTES
Vascular Surgery  Interval Note:    Patient to f/u with IR for management of endoleak   Can f/u with Dr. Alonzo in 6 months with CTA abdomen pelvis prior to assess progression of endoleak      Amadeo Taylor DO  PGY1, General Surgery  06/20/25  9:25 AM  [facility]  Pager: [phone]

## 2025-06-20 NOTE — PLAN OF CARE
Problem: Chronic Conditions and Co-morbidities  Goal: Patient's chronic conditions and co-morbidity symptoms are monitored and maintained or improved  Outcome: Progressing     Problem: Discharge Planning  Goal: Discharge to home or other facility with appropriate resources  Outcome: Progressing     Problem: Pain  Goal: Verbalizes/displays adequate comfort level or baseline comfort level  Outcome: Progressing     Problem: Skin/Tissue Integrity  Goal: Skin integrity remains intact  Description: 1.  Monitor for areas of redness and/or skin breakdown2.  Assess vascular access sites hourly3.  Every 4-6 hours minimum:  Change oxygen saturation probe site4.  Every 4-6 hours:  If on nasal continuous positive airway pressure, respiratory therapy assess nares and determine need for appliance change or resting period  Outcome: Progressing  Flowsheets (Taken 6/20/2025 0100)  Skin Integrity Remains Intact:   Monitor for areas of redness and/or skin breakdown   Turn and reposition as indicated   Positioning devices   Pressure redistribution bed/mattress (bed type)   Check visual cues for pain   Monitor skin under medical devices     Problem: Safety - Adult  Goal: Free from fall injury  Outcome: Progressing  Flowsheets (Taken 6/20/2025 0100)  Free From Fall Injury:   Instruct family/caregiver on patient safety   Based on caregiver fall risk screen, instruct family/caregiver to ask for assistance with transferring infant if caregiver noted to have fall risk factors     Problem: ABCDS Injury Assessment  Goal: Absence of physical injury  Outcome: Progressing  Flowsheets (Taken 6/20/2025 0100)  Absence of Physical Injury: Implement safety measures based on patient assessment

## 2025-06-20 NOTE — CARE COORDINATION
Case Management Assessment            Discharge Note                    Date / Time of Note: 6/20/2025 11:02 AM                  Discharge Note Completed by: Echo Bell    Patient Name: Florida Saleem   YOB: 1955  Diagnosis: Atypical chest pain [R07.89]  Complicated UTI (urinary tract infection) [N39.0]  UTI due to extended-spectrum beta lactamase (ESBL) producing Escherichia coli [N39.0, B96.29, Z16.12]   Date / Time: 6/10/2025  8:32 AM    Current PCP: Kim Liu MD  Clinic patient: No    Hospitalization in the last 30 days: No       Advance Directives:  Code Status: Full Code  [place] DNR form completed and on chart: No    Financial:  Payor: MEDICAID [place] / Plan: [place] DEPT OF [place]OB / Product Type: *No Product type* /      Pharmacy:    [facility] [id number] [place], [place] - [address] [place] [address] -  [phone] - F [phone]  [address] [place] [address]  Phone: [phone] Fax: [phone]    [id number] [place], [place] - [address] - P [phone] - F [phone]  [address]  [facility] [address]  Phone: [phone] Fax: [phone]      Assistance purchasing medications?: Potential Assistance Purchasing Medications: No  Assistance provided by Case Management: None at this time    Does patient want to participate in local refill/ meds to beds program?: No    Meds To Beds General Rules:  1. Can ONLY be done Monday- Friday between 8:30am-5pm  2. Prescription(s) must be in pharmacy by 3pm to be filled same day  3.Copy of patient's insurance/ prescription drug card and patient face sheet must be sent along with the prescription(s)  4. Cost of Rx cannot be added to hospital bill. If financial assistance is needed, please contact unit  or ;  or  CANNOT provide pharmacy voucher for patients co-pays  5. Patients can then  the prescription on their way out of the hospital at

## 2025-07-02 DIAGNOSIS — I71.40 AAA (ABDOMINAL AORTIC ANEURYSM) WITHOUT RUPTURE: Primary | ICD-10-CM

## 2025-07-25 ENCOUNTER — APPOINTMENT (OUTPATIENT)
Dept: GENERAL RADIOLOGY | Age: 70
End: 2025-07-25
Payer: MEDICAID

## 2025-07-25 ENCOUNTER — HOSPITAL ENCOUNTER (EMERGENCY)
Age: 70
Discharge: HOME | End: 2025-07-25
Attending: EMERGENCY MEDICINE
Payer: MEDICAID

## 2025-07-25 ENCOUNTER — APPOINTMENT (OUTPATIENT)
Dept: CT IMAGING | Age: 70
End: 2025-07-25
Payer: MEDICAID

## 2025-07-25 VITALS
BODY MASS INDEX: 39.11 KG/M2 | WEIGHT: 249.2 LBS | RESPIRATION RATE: 17 BRPM | TEMPERATURE: 98.1 F | DIASTOLIC BLOOD PRESSURE: 59 MMHG | HEART RATE: 73 BPM | HEIGHT: 67 IN | OXYGEN SATURATION: 96 % | SYSTOLIC BLOOD PRESSURE: 139 MMHG

## 2025-07-25 DIAGNOSIS — H11.32 SUBCONJUNCTIVAL HEMATOMA, LEFT: ICD-10-CM

## 2025-07-25 DIAGNOSIS — R22.0 SWELLING OF LEFT SIDE OF FACE: ICD-10-CM

## 2025-07-25 DIAGNOSIS — R10.84 GENERALIZED ABDOMINAL PAIN: ICD-10-CM
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
## 2025-07-25 DIAGNOSIS — H57.12 PAIN AROUND LEFT EYE: ICD-10-CM

## 2025-07-25 LAB
ALBUMIN SERPL-MCNC: 3.9 G/DL (ref 3.4–5)
ALBUMIN/GLOB SERPL: 1 {RATIO} (ref 1.1–2.2)
ALP SERPL-CCNC: 99 U/L (ref 40–129)
ALT SERPL-CCNC: 27 U/L (ref 10–40)
ANION GAP SERPL CALCULATED.3IONS-SCNC: 15 MMOL/L (ref 3–16)
AST SERPL-CCNC: 35 U/L (ref 15–37)
BASOPHILS # BLD: 0 K/UL (ref 0–0.2)
BASOPHILS NFR BLD: 0.4 %
BILIRUB SERPL-MCNC: 0.3 MG/DL (ref 0–1)
BUN SERPL-MCNC: 25 MG/DL (ref 7–20)
CALCIUM SERPL-MCNC: 8.4 MG/DL (ref 8.3–10.6)
CHLORIDE SERPL-SCNC: 100 MMOL/L (ref 99–110)
CO2 SERPL-SCNC: 23 MMOL/L (ref 21–32)
CREAT SERPL-MCNC: 6.2 MG/DL (ref 0.6–1.2)
DEPRECATED RDW RBC AUTO: 21.7 % (ref 12.4–15.4)
EOSINOPHIL # BLD: 0.8 K/UL (ref 0–0.6)
EOSINOPHIL NFR BLD: 8.9 %
GFR SERPLBLD CREATININE-BSD FMLA CKD-EPI: 7 ML/MIN/{1.73_M2}
GLUCOSE SERPL-MCNC: 106 MG/DL (ref 70–99)
HCT VFR BLD AUTO: 28.8 % (ref 36–48)
HGB BLD-MCNC: 9.7 G/DL (ref 12–16)
LACTATE BLDV-SCNC: 0.9 MMOL/L (ref 0.4–1.9)
LIPASE SERPL-CCNC: 24 U/L (ref 13–60)
LYMPHOCYTES # BLD: 1.8 K/UL (ref 1–5.1)
LYMPHOCYTES NFR BLD: 18.6 %
MCH RBC QN AUTO: 29.8 PG (ref 26–34)
MCHC RBC AUTO-ENTMCNC: 33.8 G/DL (ref 31–36)
MCV RBC AUTO: 88.4 FL (ref 80–100)
MONOCYTES # BLD: 0.7 K/UL (ref 0–1.3)
MONOCYTES NFR BLD: 7.9 %
NEUTROPHILS # BLD: 6 K/UL (ref 1.7–7.7)
NEUTROPHILS NFR BLD: 64.2 %
PLATELET # BLD AUTO: 176 K/UL (ref 135–450)
PMV BLD AUTO: 7 FL (ref 5–10.5)
POTASSIUM SERPL-SCNC: 4.1 MMOL/L (ref 3.5–5.1)
PROT SERPL-MCNC: 7.9 G/DL (ref 6.4–8.2)
RBC # BLD AUTO: 3.26 M/UL (ref 4–5.2)
SODIUM SERPL-SCNC: 138 MMOL/L (ref 136–145)
WBC # BLD AUTO: 9.4 K/UL (ref 4–11)

## 2025-07-25 PROCEDURE — 70450 CT HEAD/BRAIN W/O DYE: CPT

## 2025-07-25 PROCEDURE — 74174 CTA ABD&PLVS W/CONTRAST: CPT

## 2025-07-25 PROCEDURE — 70480 CT ORBIT/EAR/FOSSA W/O DYE: CPT

## 2025-07-25 PROCEDURE — 85025 COMPLETE CBC W/AUTO DIFF WBC: CPT

## 2025-07-25 PROCEDURE — 6360000004 HC RX CONTRAST MEDICATION

## 2025-07-25 PROCEDURE — 70486 CT MAXILLOFACIAL W/O DYE: CPT

## 2025-07-25 PROCEDURE — 83605 ASSAY OF LACTIC ACID: CPT

## 2025-07-25 PROCEDURE — 99285 EMERGENCY DEPT VISIT HI MDM: CPT

## 2025-07-25 PROCEDURE — 6370000000 HC RX 637 (ALT 250 FOR IP)

## 2025-07-25 PROCEDURE — 83690 ASSAY OF LIPASE: CPT

## 2025-07-25 PROCEDURE — 80053 COMPREHEN METABOLIC PANEL: CPT

## 2025-07-25 PROCEDURE — 73590 X-RAY EXAM OF LOWER LEG: CPT

## 2025-07-25 RX ORDER — OXYCODONE AND ACETAMINOPHEN 5; 325 MG/1; MG/1
1 TABLET ORAL ONCE
Refills: 0 | Status: COMPLETED | OUTPATIENT
Start: 2025-07-25 | End: 2025-07-25

## 2025-07-25 RX ORDER — IOPAMIDOL 755 MG/ML
75 INJECTION, SOLUTION INTRAVASCULAR
Status: COMPLETED | OUTPATIENT
Start: 2025-07-25 | End: 2025-07-25

## 2025-07-25 RX ADMIN — IOPAMIDOL 75 ML: 755 INJECTION, SOLUTION INTRAVENOUS at 17:01

## 2025-07-25 RX ADMIN — OXYCODONE HYDROCHLORIDE AND ACETAMINOPHEN 1 TABLET: 5; 325 TABLET ORAL at 21:43

## 2025-07-25 ASSESSMENT — LIFESTYLE VARIABLES
HOW MANY STANDARD DRINKS CONTAINING ALCOHOL DO YOU HAVE ON A TYPICAL DAY: PATIENT DOES NOT DRINK
HOW OFTEN DO YOU HAVE A DRINK CONTAINING ALCOHOL: NEVER

## 2025-07-25 ASSESSMENT — PAIN DESCRIPTION - LOCATION
LOCATION: HEAD
LOCATION: HEAD

## 2025-07-25 ASSESSMENT — PAIN SCALES - GENERAL
PAINLEVEL_OUTOF10: 8
PAINLEVEL_OUTOF10: 9

## 2025-07-25 NOTE — ED NOTES
Visual acuity completed, pt does not wear corrective lenses but mentions she may need some.    R eye: 20/50  L eye: 20/50  Both eyes 20/50     Natacha Kumar RN  07/25/25 [id number]

## 2025-07-25 NOTE — CONSULTS
Vascular Surgery   Resident Consult Note    Reason for Consult: AAA with endoleak    History of Present Illness:   [place] Saleem is a 70 y.o. female with Hx of ESRD, hypertension, hyperlipidemia, gout, polycystic kidney, psychosis, diabetes, AAA s/p repair, asthma/COPD who presented to the ED due to a fall at her facility. She complains of facial pain secondary to a hematoma resulting from her fall. She also complained of left side pain. She is a poor historian. She reports being wheelchair bound at baseline but cannot verbalize exactly why. She reports having some numbness in bilateral feet intermittently. She denies other complaints, asking if she can eat.    In the ED, patient is hemodynamically stable. Labs are grossly stable from her baseline a month ago. Cr remains elevated at 6.2. Normal WBC. Hgb 9.7. CTA abdomen and pelvis showed stable excluded aneurysm sac size at 6.1 cm with what appears to be a small endoleak within the anterior aspect of the excluded aneurysm sac similar to prior study from June 11, 2025. Vascular surgery was consulted at this time.     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 OR    APPENDECTOMY      CHOLECYSTECTOMY      COLONOSCOPY N/A 11/5/2024    COLONOSCOPY BIOPSY/ STOOL ASPIRATE performed by Laura Roberson MD at [facility] ENDOSCOPY    CT GUIDED RENAL

## 2025-07-25 NOTE — ED PROVIDER NOTES
ED Attending Attestation Note     Date of evaluation: 7/25/2025    This patient was seen by the resident.  I have seen and examined the patient, agree with the workup, evaluation, management and diagnosis. The care plan has been discussed.  My assessment reveals patient with ESRD on HD sent from NH for facial injury 1 week ago with worsening swelling.  Now having periorbital swelling.  Facial  
  THE [facility]  EMERGENCY DEPARTMENT ENCOUNTER          EM RESIDENT NOTE       Date of evaluation: 7/25/2025    Chief Complaint     Head Injury (Pt c/o head injury from a fall last Thursday. PT has a bump on her head. Pt denies blood thinners. PT c/o mild headaches.)      History of Present Illness     Florida Saleem is a 70 y.o. female with a past medical history ESRD on Tu/Th/Sa iHD, AAA s/p EVAR 2023 c/b aneurysm growth and type II endoleak 6/2025 (patient deferred IR embolization at that time),  PKD with complex L renal cyst s/p urology drainage 6/2025 who presents to the ED with multiple complaints.    States she initially presented to the ED from her nursing home for evaluation of left eye swelling after a fall approximately 1 week ago.  She states she was transferring from her wheelchair to the toilet when she lost her balance and fell forward, striking her left eye as well as her left shoulder, left hip, and right lower leg as she fell.  She endorses head strike but denies syncope or vomiting.  She notes that the vision in her left eye was initially black but then normalized, although since then she has been complaining of intermittent floaters in her left eye.  She denies any visual field cuts.  She does note that her left eye hurts when looking up into the left, which is new.    Patient also complains of generalized abdominal pain.  She states she has a cyst on her kidney and thinks this is causing her pain.  She is unable to tell me how long her pain has been going on but does state it is worse recently, seemingly over the last few days, although it is unclear.  She denies any fevers, hematochezia, or diarrhea.    Other than stated above, no additional associated symptoms or aggravating/alleviating factors are noted.    MEDICAL DECISION MAKING / ASSESSMENT / PLAN     INITIAL VITALS: BP: 124/68, Temp: 98.1 °F (36.7 °C), Pulse: 66, Respirations: 18, SpO2: 98 %    Nursing Notes, Past Medical Hx, 
was also evaluated by the attending physician. All care plans were discussed and agreed upon.    Clinical Impression     1. Fall from ground level    2. Pain around left eye    3. Subconjunctival hematoma, left    4. Swelling of left side of face    5. Generalized abdominal pain        Disposition     PATIENT REFERRED TO:  No follow-up provider specified.    DISCHARGE MEDICATIONS:  New Prescriptions    No medications on file       DISPOSITION Decision To Discharge 07/25/2025 10:02:50 PM   DISPOSITION CONDITION Stable             Diagnostic Results and Other Data     RADIOLOGY:  CTA ABDOMEN PELVIS W CONTRAST   Final Result      Stable excluded aneurysm sac size at 6.1 cm with what appears to be a small endoleak within the anterior aspect of the excluded aneurysm sac similar to prior study from June 11, 2025.      No acute intra-abdominal process on this somewhat limited examination.      Electronically signed by Shade Wilson MD      CT ORBITS WO CONTRAST   Final Result      1. Left periorbital hematoma without evidence of underlying fracture or acute intracranial hemorrhage.   2. Atrophy chronic infarcts and chronic ischemia.   If there is concern for acute infarct, MRI is considered more sensitive.       Electronically signed by Shade Sarah      CT MAXILLOFACIAL WO CONTRAST   Final Result      1. Left periorbital hematoma without evidence of underlying fracture or acute intracranial hemorrhage.   2. Atrophy chronic infarcts and chronic ischemia.   If there is concern for acute infarct, MRI is considered more sensitive.       Electronically signed by Shade Sarah      CT HEAD WO CONTRAST   Final Result      1. Left periorbital hematoma without evidence of underlying fracture or acute intracranial hemorrhage.   2. Atrophy chronic infarcts and chronic ischemia.   If there is concern for acute infarct, MRI is considered more sensitive.       Electronically signed by Shade Sarah      XR TIBIA FIBULA RIGHT (2 VIEWS)   Final

## 2025-07-26 NOTE — DISCHARGE INSTRUCTIONS
You were seen in the emergency department for a head injury.  We performed an evaluation and also had the vascular surgeon see you.  We determined you are safe to go home but we would like you to follow-up with the vascular surgeons in their clinic for repeat imaging.  Please reach out to your surgeon to schedule that appointment.  Please return to the ED if experience any fevers, chest pain, shortness of breath, vomiting with inability to eat or drink, loss of consciousness, any other concerning symptoms

## 2025-07-26 NOTE — ED NOTES
Report called to [facility] staff. Aware of patient returning and discharge instructions     Kumar, Natacha, RN  07/25/25 [id number]

## 2025-08-11 ENCOUNTER — APPOINTMENT (OUTPATIENT)
Dept: CT IMAGING | Age: 70
DRG: 246 | End: 2025-08-11
Payer: MEDICAID

## 2025-08-11 ENCOUNTER — HOSPITAL ENCOUNTER (INPATIENT)
Age: 70
LOS: 3 days | Discharge: HOME | DRG: 246 | End: 2025-08-14
Attending: STUDENT IN AN ORGANIZED HEALTH CARE EDUCATION/TRAINING PROGRAM | Admitting: STUDENT IN AN ORGANIZED HEALTH CARE EDUCATION/TRAINING PROGRAM
Payer: MEDICAID

## 2025-08-11 DIAGNOSIS — R10.31 ABDOMINAL PAIN, RIGHT LOWER QUADRANT: Primary | ICD-10-CM

## 2025-08-11 PROBLEM — R10.9 INTRACTABLE ABDOMINAL PAIN: Status: ACTIVE | Noted: 2025-08-11

## 2025-08-11 LAB
ALBUMIN SERPL-MCNC: 3.7 G/DL (ref 3.4–5)
ALP SERPL-CCNC: 82 U/L (ref 40–129)
ALT SERPL-CCNC: ABNORMAL U/L (ref 10–40)
ANION GAP SERPL CALCULATED.3IONS-SCNC: 15 MMOL/L (ref 3–16)
AST SERPL-CCNC: 58 U/L (ref 15–37)
BACTERIA URNS QL MICRO: ABNORMAL /HPF
BASOPHILS # BLD: 0.1 K/UL (ref 0–0.2)
BASOPHILS NFR BLD: 0.5 %
BILIRUB DIRECT SERPL-MCNC: <0.1 MG/DL (ref 0–0.3)
BILIRUB INDIRECT SERPL-MCNC: 0.4 MG/DL (ref 0–1)
BILIRUB SERPL-MCNC: 0.5 MG/DL (ref 0–1)
BILIRUB UR QL STRIP.AUTO: NEGATIVE
BUN SERPL-MCNC: 39 MG/DL (ref 7–20)
CALCIUM SERPL-MCNC: 8.5 MG/DL (ref 8.3–10.6)
CHLORIDE SERPL-SCNC: 95 MMOL/L (ref 99–110)
CLARITY UR: ABNORMAL
CO2 SERPL-SCNC: 22 MMOL/L (ref 21–32)
COLOR UR: YELLOW
CREAT SERPL-MCNC: 7.7 MG/DL (ref 0.6–1.2)
DEPRECATED RDW RBC AUTO: 21.4 % (ref 12.4–15.4)
EOSINOPHIL # BLD: 1.1 K/UL (ref 0–0.6)
EOSINOPHIL NFR BLD: 10.4 %
EPI CELLS #/AREA URNS HPF: ABNORMAL /HPF (ref 0–5)
GFR SERPLBLD CREATININE-BSD FMLA CKD-EPI: 5 ML/MIN/{1.73_M2}
GLUCOSE SERPL-MCNC: 125 MG/DL (ref 70–99)
GLUCOSE UR STRIP.AUTO-MCNC: NEGATIVE MG/DL
HCT VFR BLD AUTO: 31.2 % (ref 36–48)
HGB BLD-MCNC: 10.4 G/DL (ref 12–16)
HGB UR QL STRIP.AUTO: ABNORMAL
KETONES UR STRIP.AUTO-MCNC: NEGATIVE MG/DL
LACTATE BLDV-SCNC: 1.2 MMOL/L (ref 0.4–2)
LEUKOCYTE ESTERASE UR QL STRIP.AUTO: ABNORMAL
LIPASE SERPL-CCNC: 29 U/L (ref 13–60)
LYMPHOCYTES # BLD: 1.5 K/UL (ref 1–5.1)
LYMPHOCYTES NFR BLD: 13.1 %
MCH RBC QN AUTO: 29 PG (ref 26–34)
MCHC RBC AUTO-ENTMCNC: 33.5 G/DL (ref 31–36)
MCV RBC AUTO: 86.7 FL (ref 80–100)
MONOCYTES # BLD: 0.8 K/UL (ref 0–1.3)
MONOCYTES NFR BLD: 7.4 %
NEUTROPHILS # BLD: 7.6 K/UL (ref 1.7–7.7)
NEUTROPHILS NFR BLD: 68.6 %
NITRITE UR QL STRIP.AUTO: NEGATIVE
PH UR STRIP.AUTO: 6.5 [PH] (ref 5–8)
PLATELET # BLD AUTO: 193 K/UL (ref 135–450)
PMV BLD AUTO: 7.2 FL (ref 5–10.5)
POTASSIUM SERPL-SCNC: 3.9 MMOL/L (ref 3.5–5.1)
POTASSIUM SERPL-SCNC: ABNORMAL MMOL/L (ref 3.5–5.1)
PROT SERPL-MCNC: 7.9 G/DL (ref 6.4–8.2)
PROT UR STRIP.AUTO-MCNC: 30 MG/DL
RBC # BLD AUTO: 3.6 M/UL (ref 4–5.2)
RBC #/AREA URNS HPF: ABNORMAL /HPF (ref 0–4)
SODIUM SERPL-SCNC: 132 MMOL/L (ref 136–145)
SP GR UR STRIP.AUTO: 1.01 (ref 1–1.03)
UA COMPLETE W REFLEX CULTURE PNL UR: YES
UA DIPSTICK W REFLEX MICRO PNL UR: YES
URN SPEC COLLECT METH UR: ABNORMAL
UROBILINOGEN UR STRIP-ACNC: 0.2 E.U./DL
WBC # BLD AUTO: 11 K/UL (ref 4–11)
WBC #/AREA URNS HPF: ABNORMAL /HPF (ref 0–5)

## 2025-08-11 PROCEDURE — 81001 URINALYSIS AUTO W/SCOPE: CPT

## 2025-08-11 PROCEDURE — 2500000003 HC RX 250 WO HCPCS: Performed by: STUDENT IN AN ORGANIZED HEALTH CARE EDUCATION/TRAINING PROGRAM

## 2025-08-11 PROCEDURE — 6360000004 HC RX CONTRAST MEDICATION: Performed by: NURSE PRACTITIONER

## 2025-08-11 PROCEDURE — 87186 SC STD MICRODIL/AGAR DIL: CPT

## 2025-08-11 PROCEDURE — 2580000003 HC RX 258: Performed by: NURSE PRACTITIONER

## 2025-08-11 PROCEDURE — 96365 THER/PROPH/DIAG IV INF INIT: CPT

## 2025-08-11 PROCEDURE — 80048 BASIC METABOLIC PNL TOTAL CA: CPT

## 2025-08-11 PROCEDURE — 74174 CTA ABD&PLVS W/CONTRAST: CPT

## 2025-08-11 PROCEDURE — 6370000000 HC RX 637 (ALT 250 FOR IP): Performed by: STUDENT IN AN ORGANIZED HEALTH CARE EDUCATION/TRAINING PROGRAM

## 2025-08-11 PROCEDURE — 87077 CULTURE AEROBIC IDENTIFY: CPT

## 2025-08-11 PROCEDURE — 6360000002 HC RX W HCPCS: Performed by: STUDENT IN AN ORGANIZED HEALTH CARE EDUCATION/TRAINING PROGRAM

## 2025-08-11 PROCEDURE — 83605 ASSAY OF LACTIC ACID: CPT

## 2025-08-11 PROCEDURE — 85025 COMPLETE CBC W/AUTO DIFF WBC: CPT

## 2025-08-11 PROCEDURE — 99285 EMERGENCY DEPT VISIT HI MDM: CPT

## 2025-08-11 PROCEDURE — 80076 HEPATIC FUNCTION PANEL: CPT

## 2025-08-11 PROCEDURE — 1200000000 HC SEMI PRIVATE

## 2025-08-11 PROCEDURE — 36415 COLL VENOUS BLD VENIPUNCTURE: CPT

## 2025-08-11 PROCEDURE — 96375 TX/PRO/DX INJ NEW DRUG ADDON: CPT

## 2025-08-11 PROCEDURE — 6360000002 HC RX W HCPCS: Performed by: NURSE PRACTITIONER

## 2025-08-11 PROCEDURE — 87086 URINE CULTURE/COLONY COUNT: CPT

## 2025-08-11 PROCEDURE — 83690 ASSAY OF LIPASE: CPT

## 2025-08-11 PROCEDURE — 84132 ASSAY OF SERUM POTASSIUM: CPT

## 2025-08-11 RX ORDER — ATORVASTATIN CALCIUM 40 MG/1
40 TABLET, FILM COATED ORAL NIGHTLY
Status: DISCONTINUED | OUTPATIENT
Start: 2025-08-11 | End: 2025-08-14 | Stop reason: HOSPADM

## 2025-08-11 RX ORDER — POLYETHYLENE GLYCOL 3350 17 G/17G
17 POWDER, FOR SOLUTION ORAL DAILY
Status: DISCONTINUED | OUTPATIENT
Start: 2025-08-11 | End: 2025-08-14 | Stop reason: HOSPADM

## 2025-08-11 RX ORDER — ONDANSETRON 4 MG/1
4 TABLET, ORALLY DISINTEGRATING ORAL EVERY 8 HOURS PRN
Status: DISCONTINUED | OUTPATIENT
Start: 2025-08-11 | End: 2025-08-14 | Stop reason: HOSPADM

## 2025-08-11 RX ORDER — POLYETHYLENE GLYCOL 3350 17 G/17G
17 POWDER, FOR SOLUTION ORAL DAILY PRN
Status: DISCONTINUED | OUTPATIENT
Start: 2025-08-11 | End: 2025-08-11 | Stop reason: SDUPTHER

## 2025-08-11 RX ORDER — AMIODARONE HYDROCHLORIDE 200 MG/1
200 TABLET ORAL 2 TIMES DAILY
Status: DISCONTINUED | OUTPATIENT
Start: 2025-08-11 | End: 2025-08-13

## 2025-08-11 RX ORDER — METHOCARBAMOL 500 MG/1
500 TABLET, FILM COATED ORAL EVERY 6 HOURS PRN
Status: DISCONTINUED | OUTPATIENT
Start: 2025-08-11 | End: 2025-08-14 | Stop reason: HOSPADM

## 2025-08-11 RX ORDER — ONDANSETRON 2 MG/ML
4 INJECTION INTRAMUSCULAR; INTRAVENOUS ONCE
Status: COMPLETED | OUTPATIENT
Start: 2025-08-11 | End: 2025-08-11

## 2025-08-11 RX ORDER — ACETAMINOPHEN 325 MG/1
650 TABLET ORAL EVERY 6 HOURS PRN
Status: DISCONTINUED | OUTPATIENT
Start: 2025-08-11 | End: 2025-08-14 | Stop reason: HOSPADM

## 2025-08-11 RX ORDER — FLUTICASONE PROPIONATE 50 MCG
1 SPRAY, SUSPENSION (ML) NASAL DAILY PRN
Status: DISCONTINUED | OUTPATIENT
Start: 2025-08-12 | End: 2025-08-14 | Stop reason: HOSPADM

## 2025-08-11 RX ORDER — NIFEDIPINE 30 MG
60 TABLET, EXTENDED RELEASE ORAL DAILY
Status: DISCONTINUED | OUTPATIENT
Start: 2025-08-12 | End: 2025-08-12

## 2025-08-11 RX ORDER — ASPIRIN 81 MG/1
81 TABLET ORAL DAILY
Status: DISCONTINUED | OUTPATIENT
Start: 2025-08-11 | End: 2025-08-14 | Stop reason: HOSPADM

## 2025-08-11 RX ORDER — METOPROLOL SUCCINATE 25 MG/1
50 TABLET, EXTENDED RELEASE ORAL NIGHTLY
Status: DISCONTINUED | OUTPATIENT
Start: 2025-08-11 | End: 2025-08-12

## 2025-08-11 RX ORDER — HEPARIN SODIUM 5000 [USP'U]/ML
5000 INJECTION, SOLUTION INTRAVENOUS; SUBCUTANEOUS EVERY 8 HOURS SCHEDULED
Status: DISCONTINUED | OUTPATIENT
Start: 2025-08-11 | End: 2025-08-14 | Stop reason: HOSPADM

## 2025-08-11 RX ORDER — IOPAMIDOL 755 MG/ML
75 INJECTION, SOLUTION INTRAVASCULAR
Status: COMPLETED | OUTPATIENT
Start: 2025-08-11 | End: 2025-08-11

## 2025-08-11 RX ORDER — SODIUM CHLORIDE 9 MG/ML
INJECTION, SOLUTION INTRAVENOUS PRN
Status: DISCONTINUED | OUTPATIENT
Start: 2025-08-11 | End: 2025-08-14 | Stop reason: HOSPADM

## 2025-08-11 RX ORDER — SODIUM CHLORIDE 0.9 % (FLUSH) 0.9 %
5-40 SYRINGE (ML) INJECTION EVERY 12 HOURS SCHEDULED
Status: DISCONTINUED | OUTPATIENT
Start: 2025-08-11 | End: 2025-08-14 | Stop reason: HOSPADM

## 2025-08-11 RX ORDER — ACETAMINOPHEN 650 MG/1
650 SUPPOSITORY RECTAL EVERY 6 HOURS PRN
Status: DISCONTINUED | OUTPATIENT
Start: 2025-08-11 | End: 2025-08-14 | Stop reason: HOSPADM

## 2025-08-11 RX ORDER — ONDANSETRON 2 MG/ML
4 INJECTION INTRAMUSCULAR; INTRAVENOUS EVERY 6 HOURS PRN
Status: DISCONTINUED | OUTPATIENT
Start: 2025-08-11 | End: 2025-08-14 | Stop reason: HOSPADM

## 2025-08-11 RX ORDER — SEVELAMER CARBONATE 800 MG/1
1600 TABLET, FILM COATED ORAL 3 TIMES DAILY
Status: DISCONTINUED | OUTPATIENT
Start: 2025-08-11 | End: 2025-08-13

## 2025-08-11 RX ORDER — SODIUM CHLORIDE 0.9 % (FLUSH) 0.9 %
5-40 SYRINGE (ML) INJECTION PRN
Status: DISCONTINUED | OUTPATIENT
Start: 2025-08-11 | End: 2025-08-14 | Stop reason: HOSPADM

## 2025-08-11 RX ORDER — ALBUTEROL SULFATE 0.83 MG/ML
2.5 SOLUTION RESPIRATORY (INHALATION) EVERY 4 HOURS PRN
Status: DISCONTINUED | OUTPATIENT
Start: 2025-08-11 | End: 2025-08-14 | Stop reason: HOSPADM

## 2025-08-11 RX ADMIN — ATORVASTATIN CALCIUM 40 MG: 40 TABLET, FILM COATED ORAL at 21:52

## 2025-08-11 RX ADMIN — IOPAMIDOL 75 ML: 755 INJECTION, SOLUTION INTRAVENOUS at 11:27

## 2025-08-11 RX ADMIN — MEROPENEM 1000 MG: 1 INJECTION INTRAVENOUS at 14:40

## 2025-08-11 RX ADMIN — HYDROMORPHONE HYDROCHLORIDE 0.5 MG: 1 INJECTION, SOLUTION INTRAMUSCULAR; INTRAVENOUS; SUBCUTANEOUS at 18:28

## 2025-08-11 RX ADMIN — AMIODARONE HYDROCHLORIDE 200 MG: 200 TABLET ORAL at 21:52

## 2025-08-11 RX ADMIN — AMITRIPTYLINE HYDROCHLORIDE 25 MG: 25 TABLET, FILM COATED ORAL at 21:56

## 2025-08-11 RX ADMIN — SODIUM CHLORIDE, PRESERVATIVE FREE 10 ML: 5 INJECTION INTRAVENOUS at 21:52

## 2025-08-11 RX ADMIN — HYDROMORPHONE HYDROCHLORIDE 1 MG: 1 INJECTION, SOLUTION INTRAMUSCULAR; INTRAVENOUS; SUBCUTANEOUS at 13:21

## 2025-08-11 RX ADMIN — METOPROLOL SUCCINATE 50 MG: 25 TABLET, EXTENDED RELEASE ORAL at 21:52

## 2025-08-11 RX ADMIN — METHOCARBAMOL 500 MG: 500 TABLET ORAL at 21:56

## 2025-08-11 RX ADMIN — HYDROMORPHONE HYDROCHLORIDE 0.5 MG: 1 INJECTION, SOLUTION INTRAMUSCULAR; INTRAVENOUS; SUBCUTANEOUS at 22:42

## 2025-08-11 RX ADMIN — ONDANSETRON 4 MG: 2 INJECTION, SOLUTION INTRAMUSCULAR; INTRAVENOUS at 13:21

## 2025-08-11 ASSESSMENT — PAIN SCALES - GENERAL
PAINLEVEL_OUTOF10: 10
PAINLEVEL_OUTOF10: 10
PAINLEVEL_OUTOF10: 7
PAINLEVEL_OUTOF10: 6
PAINLEVEL_OUTOF10: 10
PAINLEVEL_OUTOF10: 7
PAINLEVEL_OUTOF10: 10

## 2025-08-11 ASSESSMENT — PAIN DESCRIPTION - LOCATION
LOCATION: ABDOMEN
LOCATION: RECTUM
LOCATION: RECTUM
LOCATION: ABDOMEN

## 2025-08-11 ASSESSMENT — PAIN DESCRIPTION - ORIENTATION
ORIENTATION: RIGHT;LOWER
ORIENTATION: POSTERIOR
ORIENTATION: POSTERIOR

## 2025-08-11 ASSESSMENT — PAIN DESCRIPTION - DESCRIPTORS
DESCRIPTORS: SHARP;SHOOTING

## 2025-08-11 ASSESSMENT — PAIN - FUNCTIONAL ASSESSMENT
PAIN_FUNCTIONAL_ASSESSMENT: PREVENTS OR INTERFERES SOME ACTIVE ACTIVITIES AND ADLS
PAIN_FUNCTIONAL_ASSESSMENT: 0-10

## 2025-08-11 ASSESSMENT — PAIN DESCRIPTION - FREQUENCY: FREQUENCY: CONTINUOUS

## 2025-08-11 ASSESSMENT — PAIN DESCRIPTION - PAIN TYPE: TYPE: ACUTE PAIN

## 2025-08-12 PROBLEM — R10.31 ABDOMINAL PAIN, RIGHT LOWER QUADRANT: Status: ACTIVE | Noted: 2025-08-12

## 2025-08-12 PROBLEM — R10.10 PAIN OF UPPER ABDOMEN: Status: ACTIVE | Noted: 2025-08-12

## 2025-08-12 LAB
ALBUMIN SERPL-MCNC: 3.4 G/DL (ref 3.4–5)
ALBUMIN/GLOB SERPL: 0.9 {RATIO} (ref 1.1–2.2)
ALP SERPL-CCNC: 96 U/L (ref 40–129)
ALT SERPL-CCNC: 28 U/L (ref 10–40)
ANION GAP SERPL CALCULATED.3IONS-SCNC: 16 MMOL/L (ref 3–16)
AST SERPL-CCNC: 26 U/L (ref 15–37)
BASOPHILS # BLD: 0.1 K/UL (ref 0–0.2)
BASOPHILS NFR BLD: 0.8 %
BILIRUB SERPL-MCNC: 0.4 MG/DL (ref 0–1)
BUN SERPL-MCNC: 46 MG/DL (ref 7–20)
CALCIUM SERPL-MCNC: 8.2 MG/DL (ref 8.3–10.6)
CHLORIDE SERPL-SCNC: 97 MMOL/L (ref 99–110)
CO2 SERPL-SCNC: 20 MMOL/L (ref 21–32)
CREAT SERPL-MCNC: 9 MG/DL (ref 0.6–1.2)
DEPRECATED RDW RBC AUTO: 20.9 % (ref 12.4–15.4)
EOSINOPHIL # BLD: 1 K/UL (ref 0–0.6)
EOSINOPHIL NFR BLD: 9.6 %
GFR SERPLBLD CREATININE-BSD FMLA CKD-EPI: 4 ML/MIN/{1.73_M2}
GLUCOSE SERPL-MCNC: 89 MG/DL (ref 70–99)
HAV IGM SERPL QL IA: NORMAL
HBV CORE IGM SERPL QL IA: NORMAL
HBV SURFACE AG SERPL QL IA: NORMAL
HCT VFR BLD AUTO: 31 % (ref 36–48)
HCV AB SERPL QL IA: NORMAL
HGB BLD-MCNC: 10.2 G/DL (ref 12–16)
LYMPHOCYTES # BLD: 1.1 K/UL (ref 1–5.1)
LYMPHOCYTES NFR BLD: 10.3 %
MCH RBC QN AUTO: 28.4 PG (ref 26–34)
MCHC RBC AUTO-ENTMCNC: 33 G/DL (ref 31–36)
MCV RBC AUTO: 86.1 FL (ref 80–100)
MONOCYTES # BLD: 0.8 K/UL (ref 0–1.3)
MONOCYTES NFR BLD: 7.2 %
NEUTROPHILS # BLD: 7.8 K/UL (ref 1.7–7.7)
NEUTROPHILS NFR BLD: 72.1 %
PLATELET # BLD AUTO: 190 K/UL (ref 135–450)
PMV BLD AUTO: 6.5 FL (ref 5–10.5)
POTASSIUM SERPL-SCNC: 4.6 MMOL/L (ref 3.5–5.1)
PROT SERPL-MCNC: 7.4 G/DL (ref 6.4–8.2)
RBC # BLD AUTO: 3.6 M/UL (ref 4–5.2)
SODIUM SERPL-SCNC: 133 MMOL/L (ref 136–145)
WBC # BLD AUTO: 10.8 K/UL (ref 4–11)

## 2025-08-12 PROCEDURE — 80074 ACUTE HEPATITIS PANEL: CPT

## 2025-08-12 PROCEDURE — 36415 COLL VENOUS BLD VENIPUNCTURE: CPT

## 2025-08-12 PROCEDURE — 99223 1ST HOSP IP/OBS HIGH 75: CPT | Performed by: INTERNAL MEDICINE

## 2025-08-12 PROCEDURE — 6370000000 HC RX 637 (ALT 250 FOR IP): Performed by: STUDENT IN AN ORGANIZED HEALTH CARE EDUCATION/TRAINING PROGRAM

## 2025-08-12 PROCEDURE — 85025 COMPLETE CBC W/AUTO DIFF WBC: CPT

## 2025-08-12 PROCEDURE — 6360000002 HC RX W HCPCS: Performed by: STUDENT IN AN ORGANIZED HEALTH CARE EDUCATION/TRAINING PROGRAM

## 2025-08-12 PROCEDURE — 90935 HEMODIALYSIS ONE EVALUATION: CPT

## 2025-08-12 PROCEDURE — 6370000000 HC RX 637 (ALT 250 FOR IP)

## 2025-08-12 PROCEDURE — 80053 COMPREHEN METABOLIC PANEL: CPT

## 2025-08-12 PROCEDURE — 2580000003 HC RX 258

## 2025-08-12 PROCEDURE — 2580000003 HC RX 258: Performed by: STUDENT IN AN ORGANIZED HEALTH CARE EDUCATION/TRAINING PROGRAM

## 2025-08-12 PROCEDURE — 5A1D70Z PERFORMANCE OF URINARY FILTRATION, INTERMITTENT, LESS THAN 6 HOURS PER DAY: ICD-10-PCS | Performed by: INTERNAL MEDICINE

## 2025-08-12 PROCEDURE — 1200000000 HC SEMI PRIVATE

## 2025-08-12 RX ORDER — METOPROLOL SUCCINATE 25 MG/1
25 TABLET, EXTENDED RELEASE ORAL NIGHTLY
Status: DISCONTINUED | OUTPATIENT
Start: 2025-08-12 | End: 2025-08-14 | Stop reason: HOSPADM

## 2025-08-12 RX ORDER — SODIUM CHLORIDE 9 MG/ML
INJECTION, SOLUTION INTRAVENOUS CONTINUOUS
Status: DISCONTINUED | OUTPATIENT
Start: 2025-08-12 | End: 2025-08-13

## 2025-08-12 RX ORDER — NIFEDIPINE 30 MG
60 TABLET, EXTENDED RELEASE ORAL DAILY
Status: DISCONTINUED | OUTPATIENT
Start: 2025-08-13 | End: 2025-08-13

## 2025-08-12 RX ADMIN — POLYETHYLENE GLYCOL 3350 17 G: 17 POWDER, FOR SOLUTION ORAL at 08:38

## 2025-08-12 RX ADMIN — MEROPENEM 500 MG: 500 INJECTION INTRAVENOUS at 18:14

## 2025-08-12 RX ADMIN — HEPARIN SODIUM 5000 UNITS: 5000 INJECTION, SOLUTION INTRAVENOUS; SUBCUTANEOUS at 21:19

## 2025-08-12 RX ADMIN — SEVELAMER CARBONATE 1600 MG: 800 TABLET, FILM COATED ORAL at 08:37

## 2025-08-12 RX ADMIN — AMITRIPTYLINE HYDROCHLORIDE 25 MG: 25 TABLET, FILM COATED ORAL at 21:15

## 2025-08-12 RX ADMIN — NIFEDIPINE 60 MG: 30 TABLET, EXTENDED RELEASE ORAL at 08:37

## 2025-08-12 RX ADMIN — AMIODARONE HYDROCHLORIDE 200 MG: 200 TABLET ORAL at 08:38

## 2025-08-12 RX ADMIN — HYDROMORPHONE HYDROCHLORIDE 0.5 MG: 1 INJECTION, SOLUTION INTRAMUSCULAR; INTRAVENOUS; SUBCUTANEOUS at 04:09

## 2025-08-12 RX ADMIN — AMIODARONE HYDROCHLORIDE 200 MG: 200 TABLET ORAL at 21:16

## 2025-08-12 RX ADMIN — Medication 6 MG: at 22:58

## 2025-08-12 RX ADMIN — SODIUM CHLORIDE: 0.9 INJECTION, SOLUTION INTRAVENOUS at 18:11

## 2025-08-12 RX ADMIN — ASPIRIN 81 MG: 81 TABLET, COATED ORAL at 08:38

## 2025-08-12 RX ADMIN — HYDROMORPHONE HYDROCHLORIDE 0.5 MG: 1 INJECTION, SOLUTION INTRAMUSCULAR; INTRAVENOUS; SUBCUTANEOUS at 22:29

## 2025-08-12 RX ADMIN — ATORVASTATIN CALCIUM 40 MG: 40 TABLET, FILM COATED ORAL at 21:16

## 2025-08-12 RX ADMIN — SODIUM CHLORIDE: 0.9 INJECTION, SOLUTION INTRAVENOUS at 05:48

## 2025-08-12 RX ADMIN — ACETAMINOPHEN 650 MG: 325 TABLET ORAL at 14:00

## 2025-08-12 RX ADMIN — METHOCARBAMOL 500 MG: 500 TABLET ORAL at 10:33

## 2025-08-12 RX ADMIN — METHOCARBAMOL 500 MG: 500 TABLET ORAL at 21:25

## 2025-08-12 RX ADMIN — HYDROMORPHONE HYDROCHLORIDE 0.5 MG: 1 INJECTION, SOLUTION INTRAMUSCULAR; INTRAVENOUS; SUBCUTANEOUS at 18:11

## 2025-08-12 ASSESSMENT — PAIN - FUNCTIONAL ASSESSMENT
PAIN_FUNCTIONAL_ASSESSMENT: 0-10
PAIN_FUNCTIONAL_ASSESSMENT: 0-10
PAIN_FUNCTIONAL_ASSESSMENT: ACTIVITIES ARE NOT PREVENTED
PAIN_FUNCTIONAL_ASSESSMENT: ACTIVITIES ARE NOT PREVENTED
PAIN_FUNCTIONAL_ASSESSMENT: 0-10

## 2025-08-12 ASSESSMENT — PAIN SCALES - GENERAL
PAINLEVEL_OUTOF10: 9
PAINLEVEL_OUTOF10: 9
PAINLEVEL_OUTOF10: 10
PAINLEVEL_OUTOF10: 9
PAINLEVEL_OUTOF10: 3
PAINLEVEL_OUTOF10: 8
PAINLEVEL_OUTOF10: 9
PAINLEVEL_OUTOF10: 3

## 2025-08-12 ASSESSMENT — PAIN DESCRIPTION - LOCATION
LOCATION: ABDOMEN;RECTUM
LOCATION: ABDOMEN;RECTUM
LOCATION: ABDOMEN
LOCATION: GENERALIZED
LOCATION: ABDOMEN;RECTUM

## 2025-08-12 ASSESSMENT — PAIN DESCRIPTION - DESCRIPTORS
DESCRIPTORS: ACHING
DESCRIPTORS: ACHING;SHARP;DISCOMFORT;SHOOTING
DESCRIPTORS: ACHING;SHARP

## 2025-08-12 ASSESSMENT — PAIN DESCRIPTION - ORIENTATION
ORIENTATION: MID
ORIENTATION: RIGHT;LOWER

## 2025-08-13 LAB
ALBUMIN SERPL-MCNC: 3.5 G/DL (ref 3.4–5)
ANION GAP SERPL CALCULATED.3IONS-SCNC: 13 MMOL/L (ref 3–16)
BACTERIA UR CULT: ABNORMAL
BASOPHILS # BLD: 0.1 K/UL (ref 0–0.2)
BASOPHILS NFR BLD: 0.7 %
BUN SERPL-MCNC: 36 MG/DL (ref 7–20)
CALCIUM SERPL-MCNC: 8.1 MG/DL (ref 8.3–10.6)
CHLORIDE SERPL-SCNC: 99 MMOL/L (ref 99–110)
CO2 SERPL-SCNC: 23 MMOL/L (ref 21–32)
CREAT SERPL-MCNC: 7.4 MG/DL (ref 0.6–1.2)
DEPRECATED RDW RBC AUTO: 21.1 % (ref 12.4–15.4)
EOSINOPHIL # BLD: 1.2 K/UL (ref 0–0.6)
EOSINOPHIL NFR BLD: 12.5 %
GFR SERPLBLD CREATININE-BSD FMLA CKD-EPI: 6 ML/MIN/{1.73_M2}
GLUCOSE SERPL-MCNC: 106 MG/DL (ref 70–99)
HCT VFR BLD AUTO: 30.3 % (ref 36–48)
HGB BLD-MCNC: 9.9 G/DL (ref 12–16)
LYMPHOCYTES # BLD: 1 K/UL (ref 1–5.1)
LYMPHOCYTES NFR BLD: 11.1 %
MAGNESIUM SERPL-MCNC: 2.38 MG/DL (ref 1.8–2.4)
MCH RBC QN AUTO: 28.2 PG (ref 26–34)
MCHC RBC AUTO-ENTMCNC: 32.5 G/DL (ref 31–36)
MCV RBC AUTO: 86.7 FL (ref 80–100)
MONOCYTES # BLD: 0.8 K/UL (ref 0–1.3)
MONOCYTES NFR BLD: 8.1 %
NEUTROPHILS # BLD: 6.4 K/UL (ref 1.7–7.7)
NEUTROPHILS NFR BLD: 67.6 %
ORGANISM: ABNORMAL
PHOSPHATE SERPL-MCNC: 3.5 MG/DL (ref 2.5–4.9)
PLATELET # BLD AUTO: 182 K/UL (ref 135–450)
PMV BLD AUTO: 7.1 FL (ref 5–10.5)
POTASSIUM SERPL-SCNC: 4.4 MMOL/L (ref 3.5–5.1)
RBC # BLD AUTO: 3.49 M/UL (ref 4–5.2)
SODIUM SERPL-SCNC: 135 MMOL/L (ref 136–145)
WBC # BLD AUTO: 9.4 K/UL (ref 4–11)

## 2025-08-13 PROCEDURE — 6360000002 HC RX W HCPCS: Performed by: STUDENT IN AN ORGANIZED HEALTH CARE EDUCATION/TRAINING PROGRAM

## 2025-08-13 PROCEDURE — 99233 SBSQ HOSP IP/OBS HIGH 50: CPT | Performed by: INTERNAL MEDICINE

## 2025-08-13 PROCEDURE — 80069 RENAL FUNCTION PANEL: CPT

## 2025-08-13 PROCEDURE — 1200000000 HC SEMI PRIVATE

## 2025-08-13 PROCEDURE — 85025 COMPLETE CBC W/AUTO DIFF WBC: CPT

## 2025-08-13 PROCEDURE — 83735 ASSAY OF MAGNESIUM: CPT

## 2025-08-13 PROCEDURE — 6370000000 HC RX 637 (ALT 250 FOR IP): Performed by: INTERNAL MEDICINE

## 2025-08-13 PROCEDURE — 6370000000 HC RX 637 (ALT 250 FOR IP)

## 2025-08-13 PROCEDURE — 6370000000 HC RX 637 (ALT 250 FOR IP): Performed by: STUDENT IN AN ORGANIZED HEALTH CARE EDUCATION/TRAINING PROGRAM

## 2025-08-13 PROCEDURE — 36415 COLL VENOUS BLD VENIPUNCTURE: CPT

## 2025-08-13 PROCEDURE — 2580000003 HC RX 258: Performed by: STUDENT IN AN ORGANIZED HEALTH CARE EDUCATION/TRAINING PROGRAM

## 2025-08-13 PROCEDURE — 2580000003 HC RX 258

## 2025-08-13 RX ORDER — SEVELAMER CARBONATE 800 MG/1
1600 TABLET, FILM COATED ORAL 3 TIMES DAILY
Status: DISCONTINUED | OUTPATIENT
Start: 2025-08-14 | End: 2025-08-14 | Stop reason: HOSPADM

## 2025-08-13 RX ORDER — AMIODARONE HYDROCHLORIDE 200 MG/1
200 TABLET ORAL DAILY
Status: DISCONTINUED | OUTPATIENT
Start: 2025-08-14 | End: 2025-08-14 | Stop reason: HOSPADM

## 2025-08-13 RX ORDER — NIFEDIPINE 30 MG
30 TABLET, EXTENDED RELEASE ORAL DAILY
Status: DISCONTINUED | OUTPATIENT
Start: 2025-08-14 | End: 2025-08-14 | Stop reason: HOSPADM

## 2025-08-13 RX ADMIN — HEPARIN SODIUM 5000 UNITS: 5000 INJECTION, SOLUTION INTRAVENOUS; SUBCUTANEOUS at 06:18

## 2025-08-13 RX ADMIN — METOPROLOL SUCCINATE 25 MG: 25 TABLET, EXTENDED RELEASE ORAL at 22:25

## 2025-08-13 RX ADMIN — SODIUM CHLORIDE: 0.9 INJECTION, SOLUTION INTRAVENOUS at 05:06

## 2025-08-13 RX ADMIN — ATORVASTATIN CALCIUM 40 MG: 40 TABLET, FILM COATED ORAL at 22:25

## 2025-08-13 RX ADMIN — HYDROMORPHONE HYDROCHLORIDE 0.5 MG: 1 INJECTION, SOLUTION INTRAMUSCULAR; INTRAVENOUS; SUBCUTANEOUS at 04:32

## 2025-08-13 RX ADMIN — METHOCARBAMOL 500 MG: 500 TABLET ORAL at 22:38

## 2025-08-13 RX ADMIN — HEPARIN SODIUM 5000 UNITS: 5000 INJECTION, SOLUTION INTRAVENOUS; SUBCUTANEOUS at 22:25

## 2025-08-13 RX ADMIN — METHOCARBAMOL 500 MG: 500 TABLET ORAL at 13:59

## 2025-08-13 RX ADMIN — Medication 6 MG: at 23:24

## 2025-08-13 RX ADMIN — ACETAMINOPHEN 650 MG: 325 TABLET ORAL at 13:59

## 2025-08-13 RX ADMIN — HYDROMORPHONE HYDROCHLORIDE 0.5 MG: 1 INJECTION, SOLUTION INTRAMUSCULAR; INTRAVENOUS; SUBCUTANEOUS at 23:34

## 2025-08-13 RX ADMIN — MEROPENEM 500 MG: 500 INJECTION INTRAVENOUS at 23:24

## 2025-08-13 RX ADMIN — ACETAMINOPHEN 650 MG: 325 TABLET ORAL at 22:39

## 2025-08-13 RX ADMIN — AMITRIPTYLINE HYDROCHLORIDE 25 MG: 25 TABLET, FILM COATED ORAL at 22:33

## 2025-08-13 ASSESSMENT — PAIN SCALES - GENERAL
PAINLEVEL_OUTOF10: 0
PAINLEVEL_OUTOF10: 8
PAINLEVEL_OUTOF10: 10
PAINLEVEL_OUTOF10: 8
PAINLEVEL_OUTOF10: 9
PAINLEVEL_OUTOF10: 9
PAINLEVEL_OUTOF10: 8

## 2025-08-13 ASSESSMENT — PAIN DESCRIPTION - LOCATION: LOCATION: ABDOMEN

## 2025-08-13 ASSESSMENT — PAIN - FUNCTIONAL ASSESSMENT
PAIN_FUNCTIONAL_ASSESSMENT: 0-10
PAIN_FUNCTIONAL_ASSESSMENT: ACTIVITIES ARE NOT PREVENTED
PAIN_FUNCTIONAL_ASSESSMENT: 0-10
PAIN_FUNCTIONAL_ASSESSMENT: 0-10
PAIN_FUNCTIONAL_ASSESSMENT: PREVENTS OR INTERFERES SOME ACTIVE ACTIVITIES AND ADLS
PAIN_FUNCTIONAL_ASSESSMENT: PREVENTS OR INTERFERES SOME ACTIVE ACTIVITIES AND ADLS
PAIN_FUNCTIONAL_ASSESSMENT: 0-10

## 2025-08-13 ASSESSMENT — PAIN DESCRIPTION - DESCRIPTORS
DESCRIPTORS: ACHING;SHARP
DESCRIPTORS: ACHING
DESCRIPTORS: SHARP
DESCRIPTORS: SHARP

## 2025-08-13 ASSESSMENT — PAIN DESCRIPTION - ORIENTATION
ORIENTATION: RIGHT;LOWER
ORIENTATION: RIGHT
ORIENTATION: RIGHT

## 2025-08-13 ASSESSMENT — PAIN DESCRIPTION - FREQUENCY
FREQUENCY: INTERMITTENT
FREQUENCY: INTERMITTENT

## 2025-08-13 ASSESSMENT — PAIN DESCRIPTION - ONSET
ONSET: ON-GOING
ONSET: ON-GOING

## 2025-08-13 ASSESSMENT — PAIN DESCRIPTION - PAIN TYPE
TYPE: ACUTE PAIN
TYPE: ACUTE PAIN

## 2025-08-14 VITALS
BODY MASS INDEX: 37.79 KG/M2 | RESPIRATION RATE: 16 BRPM | OXYGEN SATURATION: 95 % | WEIGHT: 240.74 LBS | SYSTOLIC BLOOD PRESSURE: 139 MMHG | HEART RATE: 71 BPM | TEMPERATURE: 98 F | DIASTOLIC BLOOD PRESSURE: 68 MMHG | HEIGHT: 67 IN

## 2025-08-14 LAB
ALBUMIN SERPL-MCNC: 3.3 G/DL (ref 3.4–5)
ALP SERPL-CCNC: 91 U/L (ref 40–129)
ALT SERPL-CCNC: 23 U/L (ref 10–40)
ANION GAP SERPL CALCULATED.3IONS-SCNC: 15 MMOL/L (ref 3–16)
AST SERPL-CCNC: 23 U/L (ref 15–37)
BASOPHILS # BLD: 0 K/UL (ref 0–0.2)
BASOPHILS NFR BLD: 0.5 %
BILIRUB DIRECT SERPL-MCNC: <0.1 MG/DL (ref 0–0.3)
BILIRUB INDIRECT SERPL-MCNC: 0.2 MG/DL (ref 0–1)
BILIRUB SERPL-MCNC: 0.3 MG/DL (ref 0–1)
BUN SERPL-MCNC: 46 MG/DL (ref 7–20)
CALCIUM SERPL-MCNC: 7.9 MG/DL (ref 8.3–10.6)
CHLORIDE SERPL-SCNC: 100 MMOL/L (ref 99–110)
CO2 SERPL-SCNC: 20 MMOL/L (ref 21–32)
CREAT SERPL-MCNC: 9.4 MG/DL (ref 0.6–1.2)
DEPRECATED RDW RBC AUTO: 20.6 % (ref 12.4–15.4)
EOSINOPHIL # BLD: 1.2 K/UL (ref 0–0.6)
EOSINOPHIL NFR BLD: 13.9 %
GFR SERPLBLD CREATININE-BSD FMLA CKD-EPI: 4 ML/MIN/{1.73_M2}
GLUCOSE SERPL-MCNC: 88 MG/DL (ref 70–99)
HCT VFR BLD AUTO: 29.7 % (ref 36–48)
HGB BLD-MCNC: 9.9 G/DL (ref 12–16)
LYMPHOCYTES # BLD: 1.4 K/UL (ref 1–5.1)
LYMPHOCYTES NFR BLD: 16.3 %
MAGNESIUM SERPL-MCNC: 2.41 MG/DL (ref 1.8–2.4)
MCH RBC QN AUTO: 28.6 PG (ref 26–34)
MCHC RBC AUTO-ENTMCNC: 33.2 G/DL (ref 31–36)
MCV RBC AUTO: 86.2 FL (ref 80–100)
MONOCYTES # BLD: 0.7 K/UL (ref 0–1.3)
MONOCYTES NFR BLD: 8.3 %
NEUTROPHILS # BLD: 5.2 K/UL (ref 1.7–7.7)
NEUTROPHILS NFR BLD: 61 %
PHOSPHATE SERPL-MCNC: 4.5 MG/DL (ref 2.5–4.9)
PLATELET # BLD AUTO: 175 K/UL (ref 135–450)
PMV BLD AUTO: 7.1 FL (ref 5–10.5)
POTASSIUM SERPL-SCNC: 4.2 MMOL/L (ref 3.5–5.1)
PROT SERPL-MCNC: 7.1 G/DL (ref 6.4–8.2)
RBC # BLD AUTO: 3.45 M/UL (ref 4–5.2)
SODIUM SERPL-SCNC: 135 MMOL/L (ref 136–145)
WBC # BLD AUTO: 8.5 K/UL (ref 4–11)

## 2025-08-14 PROCEDURE — 2500000003 HC RX 250 WO HCPCS: Performed by: STUDENT IN AN ORGANIZED HEALTH CARE EDUCATION/TRAINING PROGRAM

## 2025-08-14 PROCEDURE — 90935 HEMODIALYSIS ONE EVALUATION: CPT

## 2025-08-14 PROCEDURE — 6360000002 HC RX W HCPCS: Performed by: STUDENT IN AN ORGANIZED HEALTH CARE EDUCATION/TRAINING PROGRAM

## 2025-08-14 PROCEDURE — 6370000000 HC RX 637 (ALT 250 FOR IP): Performed by: STUDENT IN AN ORGANIZED HEALTH CARE EDUCATION/TRAINING PROGRAM

## 2025-08-14 PROCEDURE — 85025 COMPLETE CBC W/AUTO DIFF WBC: CPT

## 2025-08-14 PROCEDURE — 80076 HEPATIC FUNCTION PANEL: CPT

## 2025-08-14 PROCEDURE — 80069 RENAL FUNCTION PANEL: CPT

## 2025-08-14 PROCEDURE — 6370000000 HC RX 637 (ALT 250 FOR IP): Performed by: INTERNAL MEDICINE

## 2025-08-14 PROCEDURE — 6370000000 HC RX 637 (ALT 250 FOR IP): Performed by: PHYSICIAN ASSISTANT

## 2025-08-14 PROCEDURE — 99233 SBSQ HOSP IP/OBS HIGH 50: CPT | Performed by: INTERNAL MEDICINE

## 2025-08-14 PROCEDURE — 36415 COLL VENOUS BLD VENIPUNCTURE: CPT

## 2025-08-14 PROCEDURE — 83735 ASSAY OF MAGNESIUM: CPT

## 2025-08-14 RX ORDER — NIFEDIPINE 30 MG
30 TABLET, EXTENDED RELEASE ORAL DAILY
Qty: 30 TABLET | Refills: 3 | Status: SHIPPED | OUTPATIENT
Start: 2025-08-15

## 2025-08-14 RX ORDER — SULFAMETHOXAZOLE AND TRIMETHOPRIM 800; 160 MG/1; MG/1
1 TABLET ORAL DAILY
Qty: 7 TABLET | Refills: 0 | Status: SHIPPED | OUTPATIENT
Start: 2025-08-14 | End: 2025-08-21

## 2025-08-14 RX ORDER — HEPARIN SODIUM 1000 [USP'U]/ML
3600 INJECTION, SOLUTION INTRAVENOUS; SUBCUTANEOUS PRN
Status: DISCONTINUED | OUTPATIENT
Start: 2025-08-14 | End: 2025-08-14 | Stop reason: HOSPADM

## 2025-08-14 RX ORDER — METOPROLOL SUCCINATE 25 MG/1
25 TABLET, EXTENDED RELEASE ORAL NIGHTLY
Qty: 30 TABLET | Refills: 3 | Status: SHIPPED | OUTPATIENT
Start: 2025-08-14

## 2025-08-14 RX ADMIN — NIFEDIPINE 30 MG: 30 TABLET, EXTENDED RELEASE ORAL at 12:22

## 2025-08-14 RX ADMIN — HEPARIN SODIUM 5000 UNITS: 5000 INJECTION, SOLUTION INTRAVENOUS; SUBCUTANEOUS at 05:52

## 2025-08-14 RX ADMIN — SEVELAMER CARBONATE 1600 MG: 800 TABLET, FILM COATED ORAL at 12:22

## 2025-08-14 RX ADMIN — ACETAMINOPHEN 650 MG: 325 TABLET ORAL at 15:59

## 2025-08-14 RX ADMIN — METHOCARBAMOL 500 MG: 500 TABLET ORAL at 05:52

## 2025-08-14 RX ADMIN — ONDANSETRON 4 MG: 2 INJECTION, SOLUTION INTRAMUSCULAR; INTRAVENOUS at 12:21

## 2025-08-14 RX ADMIN — SODIUM CHLORIDE, PRESERVATIVE FREE 10 ML: 5 INJECTION INTRAVENOUS at 12:22

## 2025-08-14 RX ADMIN — AMIODARONE HYDROCHLORIDE 200 MG: 200 TABLET ORAL at 12:22

## 2025-08-14 RX ADMIN — ASPIRIN 81 MG: 81 TABLET, COATED ORAL at 12:22

## 2025-08-14 RX ADMIN — HYDROMORPHONE HYDROCHLORIDE 0.5 MG: 1 INJECTION, SOLUTION INTRAMUSCULAR; INTRAVENOUS; SUBCUTANEOUS at 12:21

## 2025-08-14 RX ADMIN — HYDROMORPHONE HYDROCHLORIDE 0.5 MG: 1 INJECTION, SOLUTION INTRAMUSCULAR; INTRAVENOUS; SUBCUTANEOUS at 05:52

## 2025-08-14 RX ADMIN — METHOCARBAMOL 500 MG: 500 TABLET ORAL at 12:22

## 2025-08-14 ASSESSMENT — PAIN DESCRIPTION - ONSET
ONSET: AWAKENED FROM SLEEP
ONSET: ON-GOING

## 2025-08-14 ASSESSMENT — PAIN DESCRIPTION - LOCATION
LOCATION: ABDOMEN;BACK;LEG

## 2025-08-14 ASSESSMENT — PAIN SCALES - GENERAL
PAINLEVEL_OUTOF10: 0
PAINLEVEL_OUTOF10: 0
PAINLEVEL_OUTOF10: 8
PAINLEVEL_OUTOF10: 6
PAINLEVEL_OUTOF10: 9
PAINLEVEL_OUTOF10: 0
PAINLEVEL_OUTOF10: 5

## 2025-08-14 ASSESSMENT — PAIN DESCRIPTION - ORIENTATION
ORIENTATION: RIGHT
ORIENTATION: MID
ORIENTATION: RIGHT;LEFT
ORIENTATION: RIGHT

## 2025-08-14 ASSESSMENT — PAIN - FUNCTIONAL ASSESSMENT
PAIN_FUNCTIONAL_ASSESSMENT: 0-10
PAIN_FUNCTIONAL_ASSESSMENT: PREVENTS OR INTERFERES SOME ACTIVE ACTIVITIES AND ADLS
PAIN_FUNCTIONAL_ASSESSMENT: 0-10
PAIN_FUNCTIONAL_ASSESSMENT: PREVENTS OR INTERFERES SOME ACTIVE ACTIVITIES AND ADLS
PAIN_FUNCTIONAL_ASSESSMENT: PREVENTS OR INTERFERES SOME ACTIVE ACTIVITIES AND ADLS

## 2025-08-14 ASSESSMENT — PAIN DESCRIPTION - DIRECTION: RADIATING_TOWARDS: NO

## 2025-08-14 ASSESSMENT — PAIN DESCRIPTION - PAIN TYPE
TYPE: ACUTE PAIN;CHRONIC PAIN
TYPE: ACUTE PAIN

## 2025-08-14 ASSESSMENT — PAIN DESCRIPTION - FREQUENCY
FREQUENCY: INTERMITTENT
FREQUENCY: CONTINUOUS

## 2025-08-14 ASSESSMENT — PAIN DESCRIPTION - DESCRIPTORS
DESCRIPTORS: SHARP
DESCRIPTORS: ACHING
DESCRIPTORS: ACHING
DESCRIPTORS: SHARP

## (undated) DEVICE — FORCEPS BX L240CM WRK CHN 2.8MM STD CAP W/ NDL MIC MESH